# Patient Record
Sex: MALE | Race: BLACK OR AFRICAN AMERICAN | NOT HISPANIC OR LATINO | Employment: OTHER | URBAN - METROPOLITAN AREA
[De-identification: names, ages, dates, MRNs, and addresses within clinical notes are randomized per-mention and may not be internally consistent; named-entity substitution may affect disease eponyms.]

---

## 2018-04-30 ENCOUNTER — OFFICE VISIT (OUTPATIENT)
Dept: PODIATRY | Facility: CLINIC | Age: 43
End: 2018-04-30
Payer: COMMERCIAL

## 2018-04-30 VITALS
BODY MASS INDEX: 25.9 KG/M2 | HEIGHT: 71 IN | WEIGHT: 185 LBS | SYSTOLIC BLOOD PRESSURE: 145 MMHG | DIASTOLIC BLOOD PRESSURE: 80 MMHG | HEART RATE: 79 BPM | TEMPERATURE: 98 F

## 2018-04-30 DIAGNOSIS — M79.2 NEURITIS: ICD-10-CM

## 2018-04-30 DIAGNOSIS — M76.61 ACHILLES TENDINITIS OF RIGHT LOWER EXTREMITY: ICD-10-CM

## 2018-04-30 DIAGNOSIS — M19.071 OSTEOARTHRITIS OF RIGHT FOOT, UNSPECIFIED OSTEOARTHRITIS TYPE: Primary | ICD-10-CM

## 2018-04-30 DIAGNOSIS — G57.51 TARSAL TUNNEL SYNDROME OF RIGHT SIDE: ICD-10-CM

## 2018-04-30 PROCEDURE — 99999 PR PBB SHADOW E&M-EST. PATIENT-LVL III: CPT | Mod: PBBFAC,,, | Performed by: PODIATRIST

## 2018-04-30 PROCEDURE — 99213 OFFICE O/P EST LOW 20 MIN: CPT | Mod: S$PBB,,, | Performed by: PODIATRIST

## 2018-04-30 PROCEDURE — 99213 OFFICE O/P EST LOW 20 MIN: CPT | Mod: PBBFAC | Performed by: PODIATRIST

## 2018-04-30 RX ORDER — LIDOCAINE 40 MG/G
CREAM TOPICAL
COMMUNITY
End: 2018-08-29

## 2018-04-30 RX ORDER — OXYCODONE AND ACETAMINOPHEN 10; 325 MG/1; MG/1
1 TABLET ORAL
Qty: 180 TABLET | Refills: 0 | Status: SHIPPED | OUTPATIENT
Start: 2018-04-30 | End: 2018-05-30 | Stop reason: SDUPTHER

## 2018-04-30 RX ORDER — PREGABALIN 75 MG/1
75 CAPSULE ORAL 2 TIMES DAILY
COMMUNITY
End: 2018-07-02 | Stop reason: SDUPTHER

## 2018-04-30 RX ORDER — PREGABALIN 100 MG/1
100 CAPSULE ORAL 2 TIMES DAILY
Qty: 60 CAPSULE | Refills: 5 | Status: SHIPPED | OUTPATIENT
Start: 2018-04-30 | End: 2018-05-30

## 2018-05-05 PROBLEM — M79.2 NEURITIS: Status: ACTIVE | Noted: 2018-05-05

## 2018-05-05 PROBLEM — G57.51 TARSAL TUNNEL SYNDROME OF RIGHT SIDE: Status: ACTIVE | Noted: 2018-05-05

## 2018-05-05 PROBLEM — M19.071 OSTEOARTHRITIS OF RIGHT FOOT: Status: ACTIVE | Noted: 2018-05-05

## 2018-05-05 NOTE — PROGRESS NOTES
Subjective:       Patient ID: Sg Sesay is a 42 y.o. male.    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  Patient states [facility] has not started physical therapy at this point.  Review of Systems   Musculoskeletal: Positive for gait problem and joint swelling.   All other systems reviewed and are negative.      Objective:      Physical Exam   Cardiovascular:   Pulses:       Dorsalis pedis pulses are 2+ on the right side, and 2+ on the left side.        Posterior tibial pulses are 2+ on the right side, and 2+ on the left side.   Musculoskeletal:        Right foot: There is decreased range of motion and deformity.   Feet:   Right Foot:   Protective Sensation: 4 sites tested. 4 sites sensed.   Left Foot:   Protective Sensation: 4 sites tested. 4 sites sensed.     on evaluation all the patient's incisions are healed patient has less inflammation than previously noted there are no skin breaks no active signs of infection however patient does have a limited range of motion there still some degree of pain primarily in the medial portion of the right foot however overall the area has improved some nerve related discomfort is appreciated.  Previously noted Achilles tendinitis appears resolved at this time.  Assessment:       1. Osteoarthritis of right foot, unspecified osteoarthritis type    2. Achilles tendinitis of right lower extremity    3. Tarsal tunnel syndrome of right side    4. 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  Patient states he is seeing signs of improvement although he still having considerable discomfort he states overall it does feel better he still has some degree of nerve related pain this is in the tarsal tunnel area of the heels doing better previously noted Achilles tendinitis is better.  Patient does require continued pain medication he is currently taking Lyrica 100 mg twice a day this is working well for the patient to reduce his nerve related discomfort.  I had previously ordered physical therapy for the patient however [facility] in the past month has not approved the patient's physical therapy visits and has not been able to start these delaying the patient's recovery.  Plan follow-up will be 1 month patient is now beyond his global period following the surgical procedure and is requiring additional care and treatment beyond surgical care covered under global period.

## 2018-05-30 ENCOUNTER — OFFICE VISIT (OUTPATIENT)
Dept: PODIATRY | Facility: CLINIC | Age: 43
End: 2018-05-30
Payer: COMMERCIAL

## 2018-05-30 VITALS
DIASTOLIC BLOOD PRESSURE: 73 MMHG | BODY MASS INDEX: 24.38 KG/M2 | HEIGHT: 72 IN | HEART RATE: 72 BPM | WEIGHT: 180 LBS | SYSTOLIC BLOOD PRESSURE: 117 MMHG | TEMPERATURE: 97 F

## 2018-05-30 DIAGNOSIS — G57.51 TARSAL TUNNEL SYNDROME OF RIGHT SIDE: Primary | ICD-10-CM

## 2018-05-30 DIAGNOSIS — M79.2 NEURITIS: ICD-10-CM

## 2018-05-30 DIAGNOSIS — M19.071 PRIMARY OSTEOARTHRITIS OF RIGHT FOOT: ICD-10-CM

## 2018-05-30 PROCEDURE — 99999 PR PBB SHADOW E&M-EST. PATIENT-LVL III: CPT | Mod: PBBFAC,,, | Performed by: PODIATRIST

## 2018-05-30 PROCEDURE — 99213 OFFICE O/P EST LOW 20 MIN: CPT | Mod: PBBFAC | Performed by: PODIATRIST

## 2018-05-30 PROCEDURE — 99213 OFFICE O/P EST LOW 20 MIN: CPT | Mod: S$PBB,,, | Performed by: PODIATRIST

## 2018-05-30 RX ORDER — OXYCODONE AND ACETAMINOPHEN 10; 325 MG/1; MG/1
1 TABLET ORAL
Qty: 180 TABLET | Refills: 0 | Status: SHIPPED | OUTPATIENT
Start: 2018-05-30 | End: 2018-05-30

## 2018-05-30 RX ORDER — OXYCODONE AND ACETAMINOPHEN 10; 325 MG/1; MG/1
1 TABLET ORAL 4 TIMES DAILY
Qty: 120 TABLET | Refills: 0 | Status: SHIPPED | OUTPATIENT
Start: 2018-05-30 | End: 2018-06-27 | Stop reason: SDUPTHER

## 2018-06-02 NOTE — PROGRESS NOTES
Subjective:       Patient ID: Sg Sesay is a 42 y.o. male.    Chief Complaint: Follow-up    HPI patient presents for follow-up subtalar joint fusion that needed to be redone due to another surgeon performing the procedure and leading to a nonunion.  Patient states he still waiting on the [facility] to [facility] has not started physical therapy at this point.  Review of Systems   Musculoskeletal: Positive for gait problem and joint swelling.   All other systems reviewed and are negative.      Objective:      Physical Exam   Cardiovascular:   Pulses:       Dorsalis pedis pulses are 2+ on the right side, and 2+ on the left side.        Posterior tibial pulses are 2+ on the right side, and 2+ on the left side.   Musculoskeletal:        Right foot: There is decreased range of motion and deformity.   Feet:   Right Foot:   Protective Sensation: 4 sites tested. 4 sites sensed.   Left Foot:   Protective Sensation: 4 sites tested. 4 sites sensed.     on evaluation all the patient's incisions are healed patient has less inflammation than previously noted there are no skin breaks no active signs of infection however patient does have a limited range of motion there still some degree of pain primarily in the medial portion of the right foot however overall the area has improved some nerve related discomfort is appreciated.  Previously noted Achilles tendinitis appears resolved at this time.  Assessment:       1. Tarsal tunnel syndrome of right side    2. Neuritis    3. Primary osteoarthritis of right foot        Plan:       Patient presents for follow-up.The patient presents status post fusion subtalar joint of previous nonunion right tarsal tunnel release with release of Phan nerve entrapment and scar revision right.patient states that he is doing very well he's not really having any pain in the subtalar joint or the ankle joint.  Patient states he is seeing signs of improvement although he still having  considerable discomfort he states overall it does feel better he still has some degree of nerve related pain this is in the tarsal tunnel area of the heels doing better previously noted Achilles tendinitis is better.  Patient does require continued pain medication he is currently taking Lyrica 100 mg twice a day this is working well for the patient to reduce his nerve related discomfort. Plan follow-up will be 1 month patient is now beyond his global period following the surgical procedure and is requiring additional care and treatment beyond surgical care covered under global period.  Patient states that the [facility] refused the physical therapy were that I provided and are requiring him to drive to [employer] to go to physical therapy once a week.  Patient states he is not releasing the benefit of physical therapy they are doing some massage and some stretching they are not doing any of the cold laser dry needling or iontophoresis that I had recommended.  Patient indicated that he is very frustrated that he is not able to get the physical therapy he needs.  Patient is having some discomfort overlying the styloid process right possibly related to the patient's compensation I did apply some lateral posting to the right arch supports and orthotic to see how this offload pressure from the area I have used adhesive felt as a temporary posting to ensure that the patient is tolerating this well I can add permanent posting as necessary patient noted relief almost immediately.  Plan follow-up 1 month.

## 2018-06-27 ENCOUNTER — OFFICE VISIT (OUTPATIENT)
Dept: PODIATRY | Facility: CLINIC | Age: 43
End: 2018-06-27
Payer: COMMERCIAL

## 2018-06-27 ENCOUNTER — HOSPITAL ENCOUNTER (OUTPATIENT)
Dept: RADIOLOGY | Facility: HOSPITAL | Age: 43
Discharge: HOME | End: 2018-06-27
Attending: PODIATRIST
Payer: MEDICARE

## 2018-06-27 VITALS
HEIGHT: 72 IN | HEART RATE: 67 BPM | WEIGHT: 185 LBS | SYSTOLIC BLOOD PRESSURE: 116 MMHG | TEMPERATURE: 98 F | DIASTOLIC BLOOD PRESSURE: 66 MMHG | BODY MASS INDEX: 25.06 KG/M2

## 2018-06-27 DIAGNOSIS — M79.671 CHRONIC PAIN IN RIGHT FOOT: Primary | ICD-10-CM

## 2018-06-27 DIAGNOSIS — G89.29 CHRONIC PAIN IN RIGHT FOOT: Primary | ICD-10-CM

## 2018-06-27 DIAGNOSIS — M79.671 CHRONIC PAIN IN RIGHT FOOT: ICD-10-CM

## 2018-06-27 DIAGNOSIS — M19.071 PRIMARY OSTEOARTHRITIS OF RIGHT FOOT: ICD-10-CM

## 2018-06-27 DIAGNOSIS — G89.29 CHRONIC PAIN IN RIGHT FOOT: ICD-10-CM

## 2018-06-27 PROCEDURE — 99999 PR PBB SHADOW E&M-EST. PATIENT-LVL III: CPT | Mod: 25,PBBFAC,, | Performed by: PODIATRIST

## 2018-06-27 PROCEDURE — 73630 X-RAY EXAM OF FOOT: CPT | Mod: 26,RT,, | Performed by: RADIOLOGY

## 2018-06-27 PROCEDURE — 99213 OFFICE O/P EST LOW 20 MIN: CPT | Mod: 25,PBBFAC | Performed by: PODIATRIST

## 2018-06-27 PROCEDURE — 73630 X-RAY EXAM OF FOOT: CPT | Mod: TC,FY,RT

## 2018-06-27 PROCEDURE — 99213 OFFICE O/P EST LOW 20 MIN: CPT | Mod: S$PBB,,, | Performed by: PODIATRIST

## 2018-06-27 RX ORDER — OXYCODONE AND ACETAMINOPHEN 10; 325 MG/1; MG/1
1 TABLET ORAL 4 TIMES DAILY
Qty: 120 TABLET | Refills: 0 | Status: SHIPPED | OUTPATIENT
Start: 2018-06-27 | End: 2018-07-27

## 2018-07-02 ENCOUNTER — TELEPHONE (OUTPATIENT)
Dept: PODIATRY | Facility: CLINIC | Age: 43
End: 2018-07-02

## 2018-07-02 RX ORDER — PREGABALIN 75 MG/1
75 CAPSULE ORAL 2 TIMES DAILY
Qty: 60 CAPSULE | Refills: 3 | Status: SHIPPED | OUTPATIENT
Start: 2018-07-02 | End: 2018-08-01 | Stop reason: SDUPTHER

## 2018-07-06 ENCOUNTER — TELEPHONE (OUTPATIENT)
Dept: PODIATRY | Facility: CLINIC | Age: 43
End: 2018-07-06

## 2018-07-06 NOTE — TELEPHONE ENCOUNTER
----- Message from RT Ann sent at 7/6/2018  2:11 PM CDT -----  Contact: pt    pt , requesting an appt to be worked in today for arch support adjustments, thanks.

## 2018-07-07 NOTE — PROGRESS NOTES
Subjective:       Patient ID: Sg Sesay is a 42 y.o. male.    Chief Complaint: Foot Problem and Follow-up    HPI patient presents for follow-up subtalar joint fusion that needed to be redone due to another surgeon performing the procedure and leading to a nonunion.  Patient states he still waiting on the [facility] to [facility] has not started physical therapy at this point.  Review of Systems   Musculoskeletal: Positive for gait problem and joint swelling.   All other systems reviewed and are negative.      Objective:      Physical Exam   Cardiovascular:   Pulses:       Dorsalis pedis pulses are 2+ on the right side, and 2+ on the left side.        Posterior tibial pulses are 2+ on the right side, and 2+ on the left side.   Musculoskeletal:        Right foot: There is decreased range of motion and deformity.   Feet:   Right Foot:   Protective Sensation: 4 sites tested. 4 sites sensed.   Left Foot:   Protective Sensation: 4 sites tested. 4 sites sensed.     on evaluation all the patient's incisions are healed patient has less inflammation than previously noted there are no skin breaks no active signs of infection however patient does have a limited range of motion there still some degree of pain primarily in the medial portion of the right foot however overall the area has improved some nerve related discomfort is appreciated.  Previously noted Achilles tendinitis appears resolved at this time.  Assessment:       1. Chronic pain in right foot    2. Primary osteoarthritis of right foot        Plan:       Patient presents for follow-up.The patient presents status post fusion subtalar joint of previous nonunion right tarsal tunnel release with release of Phan nerve entrapment and scar revision right.patient states that he is doing very well he's not really having any pain in the subtalar joint or the ankle joint.  Patient states he is seeing signs of improvement although he still having considerable  discomfort he states overall it does feel better he still has some degree of nerve related pain this is in the tarsal tunnel area of the heels doing better previously noted Achilles tendinitis is better.  Patient does require continued pain medication he is currently taking Lyrica 100 mg twice a day this is working well for the patient to reduce his nerve related discomfort. Plan follow-up will be 1 month patient is now beyond his global period following the surgical procedure and is requiring additional care and treatment beyond surgical care covered under global period.  Patient states that the [facility] refused the physical therapy were that I provided and are requiring him to drive to [employer] [facility] to go to physical therapy once a week.  Patient states he is not releasing the benefit of physical therapy they are doing some massage and some stretching they are not doing any of the cold laser dry needling or iontophoresis that I had recommended.  Patient indicated that he is very frustrated that he is not able to get the physical therapy he needs.  Patient is having some discomfort overlying the styloid process right possibly related to the patient's compensation I did apply some lateral posting to the right arch supports and orthotic to see how this offload pressure from the area.   Patient responded well to the temporary lateral posting right foot therefore I have added a permanent cortex posting which should continue to help the patient to relieve the pain he is having overlying the styloid process right.    X-rays were taken of the area today to ensure no significant abnormality noted there is small degenerative spurring noted at the insertion of the peroneus brevis right. Plan follow-up 1 month.

## 2018-07-11 ENCOUNTER — OFFICE VISIT (OUTPATIENT)
Dept: PODIATRY | Facility: CLINIC | Age: 43
End: 2018-07-11
Payer: OTHER GOVERNMENT

## 2018-07-11 VITALS
BODY MASS INDEX: 26.32 KG/M2 | HEART RATE: 75 BPM | TEMPERATURE: 97 F | DIASTOLIC BLOOD PRESSURE: 70 MMHG | HEIGHT: 71 IN | WEIGHT: 188 LBS | SYSTOLIC BLOOD PRESSURE: 125 MMHG

## 2018-07-11 DIAGNOSIS — M79.671 CHRONIC PAIN IN RIGHT FOOT: ICD-10-CM

## 2018-07-11 DIAGNOSIS — G89.29 CHRONIC PAIN IN RIGHT FOOT: ICD-10-CM

## 2018-07-11 DIAGNOSIS — M76.71 PERONEAL TENDONITIS, RIGHT: Primary | ICD-10-CM

## 2018-07-11 DIAGNOSIS — M79.2 NEURITIS: ICD-10-CM

## 2018-07-11 PROCEDURE — 99213 OFFICE O/P EST LOW 20 MIN: CPT | Mod: PBBFAC | Performed by: PODIATRIST

## 2018-07-11 PROCEDURE — 99999 PR PBB SHADOW E&M-EST. PATIENT-LVL III: CPT | Mod: PBBFAC,,, | Performed by: PODIATRIST

## 2018-07-11 PROCEDURE — 99213 OFFICE O/P EST LOW 20 MIN: CPT | Mod: S$PBB,,, | Performed by: PODIATRIST

## 2018-07-15 NOTE — PROGRESS NOTES
Subjective:       Patient ID: Sg Sesay is a 42 y.o. male.    Chief Complaint: Follow-up; Foot Pain; and Foot Problem    Foot Pain   Associated symptoms include joint swelling.    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  Patient states he still waiting on the [facility] to [facility] therapy has not started physical therapy at this point.  Review of Systems   Musculoskeletal: Positive for gait problem and joint swelling.   All other systems reviewed and are negative.      Objective:      Physical Exam   Cardiovascular:   Pulses:       Dorsalis pedis pulses are 2+ on the right side, and 2+ on the left side.        Posterior tibial pulses are 2+ on the right side, and 2+ on the left side.   Musculoskeletal:        Right foot: There is decreased range of motion and deformity.   Feet:   Right Foot:   Protective Sensation: 4 sites tested. 4 sites sensed.   Left Foot:   Protective Sensation: 4 sites tested. 4 sites sensed.     on evaluation all the patient's incisions are healed patient has less inflammation than previously noted there are no skin breaks no active signs of infection however patient does have a limited range of motion there still some degree of pain primarily in the medial portion of the right foot however overall the area has improved some nerve related discomfort is appreciated.  Previously noted Achilles tendinitis appears resolved at this time.  Assessment:       1. Chronic pain in right foot    2. Neuritis        Plan:       Patient presents for follow-up.The patient presents status post fusion subtalar joint of previous nonunion right tarsal tunnel release with release of Phna nerve entrapment and scar revision right.patient states that he is doing very well he's not really having any pain in the subtalar joint or the ankle joint.   Patient is presenting today for an unscheduled  visit he needs adjustments to his arch supports I had placed some lateral posting on the patient's arch supports because he was having pain overlying the styloid process on the right foot and associated peroneal tendinitis.  Patient states he thought that the additional posting was going to be good however it has caused more pressure and pain over the area previous x-rays did display degenerative spurring noted at the insertion of the peroneus brevis.  The lateral posting was removed from the right arch support and I placed a soft PPT cut out pa the base of the 5th metatarsal patient stated that this felt better immediately.  Plan follow-up will be 2 weeks to see how the patient is responding to orthotic adjustment.

## 2018-08-01 ENCOUNTER — OFFICE VISIT (OUTPATIENT)
Dept: PODIATRY | Facility: CLINIC | Age: 43
End: 2018-08-01
Payer: OTHER GOVERNMENT

## 2018-08-01 VITALS
TEMPERATURE: 97 F | WEIGHT: 190 LBS | SYSTOLIC BLOOD PRESSURE: 132 MMHG | HEART RATE: 66 BPM | HEIGHT: 72 IN | DIASTOLIC BLOOD PRESSURE: 73 MMHG | BODY MASS INDEX: 25.73 KG/M2

## 2018-08-01 DIAGNOSIS — G89.29 CHRONIC PAIN OF RIGHT ANKLE: Primary | ICD-10-CM

## 2018-08-01 DIAGNOSIS — G89.29 CHRONIC PAIN IN RIGHT FOOT: ICD-10-CM

## 2018-08-01 DIAGNOSIS — M79.671 CHRONIC PAIN IN RIGHT FOOT: ICD-10-CM

## 2018-08-01 DIAGNOSIS — M79.2 NEURITIS: ICD-10-CM

## 2018-08-01 DIAGNOSIS — M25.571 CHRONIC PAIN OF RIGHT ANKLE: Primary | ICD-10-CM

## 2018-08-01 DIAGNOSIS — M76.61 ACHILLES TENDINITIS OF RIGHT LOWER EXTREMITY: ICD-10-CM

## 2018-08-01 DIAGNOSIS — M19.071 PRIMARY OSTEOARTHRITIS OF RIGHT FOOT: ICD-10-CM

## 2018-08-01 PROCEDURE — 99213 OFFICE O/P EST LOW 20 MIN: CPT | Mod: S$PBB,,, | Performed by: PODIATRIST

## 2018-08-01 PROCEDURE — 99213 OFFICE O/P EST LOW 20 MIN: CPT | Mod: PBBFAC | Performed by: PODIATRIST

## 2018-08-01 PROCEDURE — 99999 PR PBB SHADOW E&M-EST. PATIENT-LVL III: CPT | Mod: PBBFAC,,, | Performed by: PODIATRIST

## 2018-08-01 RX ORDER — OXYCODONE AND ACETAMINOPHEN 10; 325 MG/1; MG/1
1 TABLET ORAL 4 TIMES DAILY
Qty: 120 TABLET | Refills: 0 | Status: SHIPPED | OUTPATIENT
Start: 2018-08-01 | End: 2018-08-29 | Stop reason: SDUPTHER

## 2018-08-01 RX ORDER — PREGABALIN 75 MG/1
75 CAPSULE ORAL 2 TIMES DAILY
Qty: 60 CAPSULE | Refills: 3 | Status: SHIPPED | OUTPATIENT
Start: 2018-08-01 | End: 2018-08-31

## 2018-08-05 NOTE — PROGRESS NOTES
Subjective:       Patient ID: Sg Sesay is a 42 y.o. male.    Chief Complaint: Foot Pain and Foot Problem    Foot Pain   Associated symptoms include joint swelling.    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  Patient states he still waiting on the [facility] to approve physical therapy has not started physical therapy at this point.  Review of Systems   Musculoskeletal: Positive for gait problem and joint swelling.   All other systems reviewed and are negative.      Objective:      Physical Exam   Cardiovascular:   Pulses:       Dorsalis pedis pulses are 2+ on the right side, and 2+ on the left side.        Posterior tibial pulses are 2+ on the right side, and 2+ on the left side.   Musculoskeletal:        Right foot: There is decreased range of motion and deformity.   Feet:   Right Foot:   Protective Sensation: 4 sites tested. 4 sites sensed.   Left Foot:   Protective Sensation: 4 sites tested. 4 sites sensed.     On evaluation all the patient's incisions are healed patient has less inflammation than previously noted there are no skin breaks no active signs of infection however patient does have a limited range of motion there still some degree of pain primarily in the medial portion of the right foot however overall the area has improved some nerve related discomfort is appreciated.    Patient is complaining about Achilles tendon pain and tightness at the back of his right heel there is limited dorsiflexion noted due to excessive tightness in this area.  Assessment:       1. Chronic pain of right ankle    2. Chronic pain in right foot    3. Primary osteoarthritis of right foot    4. Neuritis    5. Achilles tendinitis of right lower extremity        Plan:       Patient presents for follow-up.The patient presents status post fusion subtalar joint of previous nonunion right tarsal tunnel release with  release of Phan nerve entrapment and scar revision right.patient states that he is doing very well he's not really having any pain in the subtalar joint or the ankle joint.   Patient states that padding that I placed on his insole has definitely relieved and completely resolved the discomfort he was having overlying the styloid process base of the 5th metatarsal right he is having some pain in the Achilles area that he states has become excessively tight he feels pulling in this area patient indicated today that the physical therapy has been doing has not really given him much relief the [facility] has refused to authorize the physical therapy that I have ordered which would include cold laser and dry needling instead they have got him doing water therapy that he states has not been very helpful.  Patient indicates the Lyrica I previously give him does help quite a bit he has a lot less heel pain tarsal tunnel pain and subtalar joint pain mainly today he is complaining about the Achilles tendon region.  Patient advised he is going to need to contact the [facility] in further discuss his physical therapy ultimately the patient needs the appropriate physical therapy in order to proceed move forward and decreases discomfort.

## 2018-08-10 ENCOUNTER — TELEPHONE (OUTPATIENT)
Dept: PODIATRY | Facility: CLINIC | Age: 43
End: 2018-08-10

## 2018-08-10 NOTE — TELEPHONE ENCOUNTER
----- Message from Shakira An sent at 8/10/2018 11:22 AM CDT -----  Contact: self  Patient need to speak with nurse regarding a medical slip for jury duty     Patient will like to  slip today 8/10      Please call to advice [phone] (home)

## 2018-08-29 ENCOUNTER — OFFICE VISIT (OUTPATIENT)
Dept: PODIATRY | Facility: CLINIC | Age: 43
End: 2018-08-29
Payer: OTHER GOVERNMENT

## 2018-08-29 VITALS
TEMPERATURE: 98 F | OXYGEN SATURATION: 97 % | BODY MASS INDEX: 25.06 KG/M2 | HEART RATE: 71 BPM | DIASTOLIC BLOOD PRESSURE: 69 MMHG | SYSTOLIC BLOOD PRESSURE: 123 MMHG | RESPIRATION RATE: 18 BRPM | HEIGHT: 72 IN | WEIGHT: 185 LBS

## 2018-08-29 DIAGNOSIS — M25.571 CHRONIC PAIN OF RIGHT ANKLE: ICD-10-CM

## 2018-08-29 DIAGNOSIS — M79.2 NEURITIS: ICD-10-CM

## 2018-08-29 DIAGNOSIS — G89.29 CHRONIC PAIN OF RIGHT ANKLE: ICD-10-CM

## 2018-08-29 DIAGNOSIS — M19.071 PRIMARY OSTEOARTHRITIS OF RIGHT FOOT: ICD-10-CM

## 2018-08-29 DIAGNOSIS — G89.29 CHRONIC PAIN IN RIGHT FOOT: Primary | ICD-10-CM

## 2018-08-29 DIAGNOSIS — M79.671 CHRONIC PAIN IN RIGHT FOOT: Primary | ICD-10-CM

## 2018-08-29 PROCEDURE — 99213 OFFICE O/P EST LOW 20 MIN: CPT | Mod: S$PBB,,, | Performed by: PODIATRIST

## 2018-08-29 PROCEDURE — 99213 OFFICE O/P EST LOW 20 MIN: CPT | Mod: PBBFAC | Performed by: PODIATRIST

## 2018-08-29 PROCEDURE — 99999 PR PBB SHADOW E&M-EST. PATIENT-LVL III: CPT | Mod: PBBFAC,,, | Performed by: PODIATRIST

## 2018-08-29 RX ORDER — OXYCODONE AND ACETAMINOPHEN 10; 325 MG/1; MG/1
1 TABLET ORAL 4 TIMES DAILY
Qty: 120 TABLET | Refills: 0 | Status: SHIPPED | OUTPATIENT
Start: 2018-08-29 | End: 2018-09-26 | Stop reason: SDUPTHER

## 2018-08-29 RX ORDER — PREGABALIN 150 MG/1
150 CAPSULE ORAL 3 TIMES DAILY
Qty: 90 CAPSULE | Refills: 6 | Status: SHIPPED | OUTPATIENT
Start: 2018-08-29 | End: 2018-09-28

## 2018-09-03 NOTE — PROGRESS NOTES
Subjective:       Patient ID: Sg Sesay is a 42 y.o. male.    Chief Complaint: Follow-up    Foot Pain   Associated symptoms include joint swelling.    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  Patient states he still waiting on the [facility] to approve physical therapy has not started physical therapy at this point.  Review of Systems   Musculoskeletal: Positive for gait problem and joint swelling.   All other systems reviewed and are negative.      Objective:      Physical Exam   Cardiovascular:   Pulses:       Dorsalis pedis pulses are 2+ on the right side, and 2+ on the left side.        Posterior tibial pulses are 2+ on the right side, and 2+ on the left side.   Musculoskeletal:        Right foot: There is decreased range of motion and deformity.   Feet:   Right Foot:   Protective Sensation: 4 sites tested. 4 sites sensed.   Left Foot:   Protective Sensation: 4 sites tested. 4 sites sensed.     On evaluation all the patient's incisions are healed patient has less inflammation than previously noted there are no skin breaks no active signs of infection however patient does have a limited range of motion there still some degree of pain primarily in the medial portion of the right foot however overall the area has improved some nerve related discomfort is appreciated.    Patient is complaining about Achilles tendon pain and tightness at the back of his right heel there is limited dorsiflexion noted due to excessive tightness in this area.  Assessment:       1. Chronic pain in right foot    2. Chronic pain of right ankle    3. Primary osteoarthritis of right foot    4. Neuritis        Plan:       Patient presents for follow-up.The patient presents status post fusion subtalar joint of previous nonunion right tarsal tunnel release with release of Phan nerve entrapment and scar revision right.patient states  that he is doing very well he's not really having any pain in the subtalar joint or the ankle joint.   Patient states that padding that I placed on his insole has definitely relieved and completely resolved the discomfort he was having overlying the styloid process base of the 5th metatarsal right he is having some pain in the Achilles area that he states has become excessively tight he feels pulling in this area patient indicated today that the physical therapy has been doing has not really given him much relief the [facility] has refused to authorize the physical therapy that I have ordered which would include cold laser and dry needling instead they have got him doing water therapy that he states has not been very helpful. Patient indicates that he has completed his physical therapy in the pool and states this did not help him at all did not help to diminished his discomfort at all and has not restored any range of motion. Patient indicates the Lyrica I previously give him does help quite a bit he has a lot less heel pain tarsal tunnel pain and subtalar joint pain mainly today he is complaining about the Achilles tendon region.    Patient was advised that the [facility] as inability to provide him appropriate physical therapy has been detrimental to his recovery it is definitely halted his recovery completely I had requested physical therapy on multiple occasions I have denied it and have instead only provided their own very limited therapy which has not benefited the patient at all.  Patient again advised he needs appropriate physical therapy as previously ordered he states he is going to contact the [facility] to try to get them to override and approved the physical therapy that I have ordered.  Patient's current Lyrica is being increased to 150 mg 3 times a day.

## 2018-09-10 ENCOUNTER — TELEPHONE (OUTPATIENT)
Dept: PODIATRY | Facility: CLINIC | Age: 43
End: 2018-09-10

## 2018-09-10 NOTE — TELEPHONE ENCOUNTER
----- Message from Cony De Anda sent at 9/10/2018  1:09 PM CDT -----  Contact: self  Patient needs a secondary authorization for the [facility] regarding physical therapy. Please fax to [phone] attn Verónica. Patient states Verónica is there to day if it can be sent today. Please call patient at [phone]. Thanks!

## 2018-09-26 ENCOUNTER — HOSPITAL ENCOUNTER (OUTPATIENT)
Dept: RADIOLOGY | Facility: HOSPITAL | Age: 43
Discharge: HOME | End: 2018-09-26
Attending: PODIATRIST
Payer: MEDICARE

## 2018-09-26 ENCOUNTER — OFFICE VISIT (OUTPATIENT)
Dept: PODIATRY | Facility: CLINIC | Age: 43
End: 2018-09-26
Payer: MEDICARE

## 2018-09-26 VITALS
HEART RATE: 66 BPM | SYSTOLIC BLOOD PRESSURE: 117 MMHG | DIASTOLIC BLOOD PRESSURE: 72 MMHG | WEIGHT: 195 LBS | HEIGHT: 72 IN | BODY MASS INDEX: 26.41 KG/M2 | TEMPERATURE: 98 F

## 2018-09-26 DIAGNOSIS — M79.671 CHRONIC PAIN IN RIGHT FOOT: Primary | ICD-10-CM

## 2018-09-26 DIAGNOSIS — M19.071 PRIMARY OSTEOARTHRITIS OF RIGHT FOOT: ICD-10-CM

## 2018-09-26 DIAGNOSIS — G89.29 CHRONIC PAIN IN RIGHT FOOT: Primary | ICD-10-CM

## 2018-09-26 DIAGNOSIS — M79.671 CHRONIC PAIN IN RIGHT FOOT: ICD-10-CM

## 2018-09-26 DIAGNOSIS — M25.571 CHRONIC PAIN OF RIGHT ANKLE: ICD-10-CM

## 2018-09-26 DIAGNOSIS — G89.29 CHRONIC PAIN IN RIGHT FOOT: ICD-10-CM

## 2018-09-26 DIAGNOSIS — G89.29 CHRONIC PAIN OF RIGHT ANKLE: ICD-10-CM

## 2018-09-26 PROCEDURE — 99213 OFFICE O/P EST LOW 20 MIN: CPT | Mod: S$PBB,,, | Performed by: PODIATRIST

## 2018-09-26 PROCEDURE — 73630 X-RAY EXAM OF FOOT: CPT | Mod: TC,FY,RT

## 2018-09-26 PROCEDURE — 99999 PR PBB SHADOW E&M-EST. PATIENT-LVL III: CPT | Mod: PBBFAC,,, | Performed by: PODIATRIST

## 2018-09-26 PROCEDURE — 99213 OFFICE O/P EST LOW 20 MIN: CPT | Mod: PBBFAC,25 | Performed by: PODIATRIST

## 2018-09-26 PROCEDURE — 73630 X-RAY EXAM OF FOOT: CPT | Mod: 26,RT,, | Performed by: RADIOLOGY

## 2018-09-26 RX ORDER — OXYCODONE AND ACETAMINOPHEN 10; 325 MG/1; MG/1
1 TABLET ORAL 4 TIMES DAILY
Qty: 120 TABLET | Refills: 0 | Status: SHIPPED | OUTPATIENT
Start: 2018-09-26 | End: 2018-10-24 | Stop reason: SDUPTHER

## 2018-09-30 NOTE — PROGRESS NOTES
Subjective:       Patient ID: Sg Sesay is a 42 y.o. male.    Chief Complaint: Follow-up; Foot Pain; and Foot Problem    Foot Pain   Associated symptoms include joint swelling.    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  Patient states he still [facility] to [facility] has not started physical therapy at this point.  Review of Systems   Musculoskeletal: Positive for gait problem and joint swelling.   All other systems reviewed and are negative.      Objective:      Physical Exam   Cardiovascular:   Pulses:       Dorsalis pedis pulses are 2+ on the right side, and 2+ on the left side.        Posterior tibial pulses are 2+ on the right side, and 2+ on the left side.   Musculoskeletal:        Right foot: There is decreased range of motion and deformity.   Feet:   Right Foot:   Protective Sensation: 4 sites tested. 4 sites sensed.   Left Foot:   Protective Sensation: 4 sites tested. 4 sites sensed.     On evaluation all the patient's incisions are healed patient has less inflammation than previously noted there are no skin breaks no active signs of infection however patient does have a limited range of motion there still some degree of pain primarily in the medial portion of the right foot however overall the area has improved some nerve related discomfort is appreciated.    Patient is complaining about Achilles tendon pain and tightness at the back of his right heel there is limited dorsiflexion noted due to excessive tightness in this area.  Assessment:       1. Chronic pain in right foot    2. Chronic pain of right ankle    3. 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   Patient states that padding that I placed on his insole has definitely relieved and completely resolved the discomfort he was having overlying the styloid process base of the 5th metatarsal right he is having some pain in the Achilles area that he states has become excessively tight he feels pulling in this area patient indicated today that the physical therapy has been doing has not really given him much relief the [facility] has refused to authorize the physical therapy that I have ordered which would include cold laser and dry needling instead they have got him doing water therapy that he states has not been very helpful. Patient indicates that he has completed his physical therapy in the pool and states this did not help him at all did not help to diminished his discomfort at all and has not restored any range of motion. Patient indicates the Lyrica I previously give him does help quite a bit he has a lot less heel pain tarsal tunnel pain and subtalar joint pain mainly today he is complaining about the Achilles tendon region.    Patient was advised that the [facility] as inability to provide him appropriate physical therapy has been detrimental to his recovery it is definitely halted his recovery completely I had requested physical therapy on multiple occasions I have denied it and have instead only provided their own very limited therapy which has not benefited the patient at all.  Patient again advised he needs appropriate physical therapy as previously ordered he states he is going to contact the [facility] to try to get them to override and approved the physical therapy that I have ordered.    I did add additional padding to the patient's orthotic to offload pressure overlying the styloid process of the right foot x-rays revealed no signs of fracture dislocation there is good fusion at the subtalar joint  right.  I did advise the patient some of the discomfort he is having today is at the calcaneal cuboid joint of the right foot this is noted both on palpation and range of motion right.

## 2018-10-24 ENCOUNTER — TELEPHONE (OUTPATIENT)
Dept: PODIATRY | Facility: CLINIC | Age: 43
End: 2018-10-24

## 2018-10-24 ENCOUNTER — OFFICE VISIT (OUTPATIENT)
Dept: PODIATRY | Facility: CLINIC | Age: 43
End: 2018-10-24
Payer: OTHER GOVERNMENT

## 2018-10-24 VITALS
BODY MASS INDEX: 25.06 KG/M2 | TEMPERATURE: 98 F | SYSTOLIC BLOOD PRESSURE: 113 MMHG | HEART RATE: 69 BPM | DIASTOLIC BLOOD PRESSURE: 78 MMHG | WEIGHT: 185 LBS | HEIGHT: 72 IN

## 2018-10-24 DIAGNOSIS — M25.571 CHRONIC PAIN OF RIGHT ANKLE: Primary | ICD-10-CM

## 2018-10-24 DIAGNOSIS — M72.2 PLANTAR FASCIITIS: ICD-10-CM

## 2018-10-24 DIAGNOSIS — M79.2 NEURITIS: ICD-10-CM

## 2018-10-24 DIAGNOSIS — M19.071 PRIMARY OSTEOARTHRITIS OF RIGHT FOOT: ICD-10-CM

## 2018-10-24 DIAGNOSIS — M79.671 CHRONIC PAIN IN RIGHT FOOT: ICD-10-CM

## 2018-10-24 DIAGNOSIS — G89.29 CHRONIC PAIN OF RIGHT ANKLE: Primary | ICD-10-CM

## 2018-10-24 DIAGNOSIS — G89.29 CHRONIC PAIN IN RIGHT FOOT: ICD-10-CM

## 2018-10-24 PROCEDURE — 99213 OFFICE O/P EST LOW 20 MIN: CPT | Mod: S$PBB,,, | Performed by: PODIATRIST

## 2018-10-24 PROCEDURE — 99999 PR PBB SHADOW E&M-EST. PATIENT-LVL III: CPT | Mod: PBBFAC,,, | Performed by: PODIATRIST

## 2018-10-24 PROCEDURE — 99213 OFFICE O/P EST LOW 20 MIN: CPT | Mod: PBBFAC | Performed by: PODIATRIST

## 2018-10-24 RX ORDER — PREGABALIN 150 MG/1
CAPSULE ORAL
Refills: 5 | COMMUNITY
Start: 2018-10-01 | End: 2019-03-13 | Stop reason: SDUPTHER

## 2018-10-24 RX ORDER — OXYCODONE AND ACETAMINOPHEN 10; 325 MG/1; MG/1
1 TABLET ORAL 4 TIMES DAILY
Qty: 120 TABLET | Refills: 0 | Status: SHIPPED | OUTPATIENT
Start: 2018-10-24 | End: 2018-11-05 | Stop reason: SDUPTHER

## 2018-10-24 NOTE — TELEPHONE ENCOUNTER
----- Message from Yash Lantigua sent at 10/24/2018  9:36 AM CDT -----  Contact: [facility]  Type: Needs Medical Advice    Who Called:  [facility]  Additional Information: Pharmacy is out of oxyCODONE-acetaminophen (PERCOCET)  mg per tablet medication.     [id number] 234 beds at the [facility], [place] - [address]  [address]  [place] [address]  Phone: [phone] Fax: [phone]

## 2018-10-24 NOTE — TELEPHONE ENCOUNTER
Left message for pt that medication sent to pharmacy would not be available until Friday or Monday the pharmacy had to order call pharmacy if he wants them to when meds are available or call the office if this is not ok

## 2018-10-30 ENCOUNTER — TELEPHONE (OUTPATIENT)
Dept: PODIATRY | Facility: CLINIC | Age: 43
End: 2018-10-30

## 2018-10-30 NOTE — TELEPHONE ENCOUNTER
Left a message for patient that we would have to do a drug screen.  Advised him I would call back tomorrow with more information.

## 2018-10-30 NOTE — TELEPHONE ENCOUNTER
----- Message from Xiomara Oates sent at 10/30/2018  2:49 PM CDT -----  Contact: Patient  Type:  RX Refill Request    Who Called: Patient  Refill or New Rx:  Refill  RX Name and Strength:  oxyCODONE-acetaminophen (PERCOCET)  mg per tablet,   LYRICA 150 mg capsule  How is the patient currently taking it? (ex. 1XDay): Percocet- Take 1 tablet by mouth 4 (four) times daily. - Oral  LYRICA- TK ONE C PO TID  Is this a 30 day or 90 day RX:  Percocet- 120 tablets and LYRICA -90 Tablets  Preferred Pharmacy with phone number: Hard copy for the Percocet and electronic for the LYRICA  Local or Mail Order:  Local  Ordering Provider:  Dr Jah Torres  [facility] Call Back Number:  [phone]  Additional Information:  Calling to request a refill. Please advise.

## 2018-10-31 DIAGNOSIS — Z02.83 ENCOUNTER FOR DRUG SCREENING: Primary | ICD-10-CM

## 2018-10-31 NOTE — PROGRESS NOTES
Subjective:       Patient ID: Sg Sesay is a 42 y.o. male.    Chief Complaint: Follow-up; Foot Pain; and Foot Problem  Patient presents for followed up of continued right foot pain he states he has been actually having more pain in his left foot because of compensating.  Patient relates arch pain left continued pain right.  Foot Pain   Associated symptoms include joint swelling.      Review of Systems   Musculoskeletal: Positive for gait problem and joint swelling.   All other systems reviewed and are negative.      Objective:      Physical Exam   Cardiovascular:   Pulses:       Dorsalis pedis pulses are 2+ on the right side, and 2+ on the left side.        Posterior tibial pulses are 2+ on the right side, and 2+ on the left side.   Musculoskeletal:        Right foot: There is decreased range of motion and deformity.   Feet:   Right Foot:   Protective Sensation: 4 sites tested. 4 sites sensed.   Left Foot:   Protective Sensation: 4 sites tested. 4 sites sensed.     On evaluation all the patient's incisions are healed patient has less inflammation than previously noted there are no skin breaks no active signs of infection however patient does have a limited range of motion there still some degree of pain primarily in the medial portion of the right foot however overall the area has improved some nerve related discomfort is appreciated.    Patient is complaining about Achilles tendon pain and tightness at the back of his right heel there is limited dorsiflexion noted due to excessive tightness in this area.  Patient has findings consistent with plantar fascial pain left.  Assessment:       1. Chronic pain of right ankle    2. Primary osteoarthritis of right foot    3. Chronic pain in right foot    4. Neuritis    5. Plantar fasciitis - Left Foot        Plan:         Following evaluation patient has continued pain of the right foot he again has not been able to be approved for physical therapy which he  desperately needs of the right foot the [facility] has held up his approval patient states he does have an appointment with the [facility] on October 30th he is helping at that time they will approve him for physical therapy in the meantime the patient has been experiencing arch and heel pain in the left foot.  Patient indicated the blue pad that I placed on his right arch support was too much and needed to be removed.  I did add some lateral posting to the patient's right orthotic to help offload pressure overlying the base of the 5th metatarsal right patient is going to try this and see how well he does with this patient is going to continue taking Lyrica as directed I did give the patient pain medication as directed for chronic pain of the right foot and now of the left foot I did evaluate the  prior to prescribing this.  I am hoping the patient will be able to be approved for physical therapy he should have been approved many months ago which would have helped his progress following surgery on the right foot however this has been repeatedly denied by the [facility].  Total face-to-face time including discussion evaluation treatment equaled 20 min.

## 2018-11-01 ENCOUNTER — LAB VISIT (OUTPATIENT)
Dept: LAB | Facility: HOSPITAL | Age: 43
End: 2018-11-01
Attending: PODIATRIST
Payer: MEDICARE

## 2018-11-01 DIAGNOSIS — Z02.83 ENCOUNTER FOR DRUG SCREENING: ICD-10-CM

## 2018-11-01 PROCEDURE — 80307 DRUG TEST PRSMV CHEM ANLYZR: CPT

## 2018-11-05 ENCOUNTER — TELEPHONE (OUTPATIENT)
Dept: PODIATRY | Facility: CLINIC | Age: 43
End: 2018-11-05

## 2018-11-05 RX ORDER — OXYCODONE AND ACETAMINOPHEN 10; 325 MG/1; MG/1
1 TABLET ORAL 4 TIMES DAILY
Qty: 28 TABLET | Refills: 0 | Status: SHIPPED | OUTPATIENT
Start: 2018-11-05 | End: 2018-11-06 | Stop reason: SDUPTHER

## 2018-11-05 NOTE — TELEPHONE ENCOUNTER
Patient is without pain medications d/t new drug screening being a send out.  Can you please write him something to last until we get the results back.

## 2018-11-06 LAB

## 2018-11-06 RX ORDER — OXYCODONE AND ACETAMINOPHEN 10; 325 MG/1; MG/1
1 TABLET ORAL 4 TIMES DAILY
Qty: 84 TABLET | Refills: 0 | Status: SHIPPED | OUTPATIENT
Start: 2018-11-06 | End: 2018-11-26 | Stop reason: SDUPTHER

## 2018-11-16 ENCOUNTER — HOSPITAL ENCOUNTER (EMERGENCY)
Facility: HOSPITAL | Age: 43
Discharge: HOME | End: 2018-11-16
Attending: FAMILY MEDICINE
Payer: MEDICARE

## 2018-11-16 VITALS
HEART RATE: 102 BPM | BODY MASS INDEX: 25.06 KG/M2 | TEMPERATURE: 99 F | RESPIRATION RATE: 20 BRPM | OXYGEN SATURATION: 98 % | SYSTOLIC BLOOD PRESSURE: 146 MMHG | DIASTOLIC BLOOD PRESSURE: 96 MMHG | WEIGHT: 185 LBS | HEIGHT: 72 IN

## 2018-11-16 DIAGNOSIS — S09.93XA FACIAL INJURY, INITIAL ENCOUNTER: Primary | ICD-10-CM

## 2018-11-16 PROCEDURE — 99284 EMERGENCY DEPT VISIT MOD MDM: CPT | Mod: 25

## 2018-11-16 PROCEDURE — 70450 CT HEAD/BRAIN W/O DYE: CPT | Mod: 26,,, | Performed by: RADIOLOGY

## 2018-11-16 PROCEDURE — 70450 CT HEAD/BRAIN W/O DYE: CPT | Mod: TC
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
## 2018-11-17 NOTE — DISCHARGE INSTRUCTIONS
Return to ER for any change or worsening of condition.  Follow-up primary care provider in the next 2-3 days if any continued swelling, bruising or pain persists.  May apply ice to area to decrease pain and swelling. May take over-the-counter ibuprofen or Aleve as needed for discomfort.

## 2018-11-26 ENCOUNTER — OFFICE VISIT (OUTPATIENT)
Dept: PODIATRY | Facility: CLINIC | Age: 43
End: 2018-11-26
Payer: COMMERCIAL

## 2018-11-26 VITALS
DIASTOLIC BLOOD PRESSURE: 63 MMHG | BODY MASS INDEX: 25.73 KG/M2 | WEIGHT: 190 LBS | HEIGHT: 72 IN | HEART RATE: 79 BPM | TEMPERATURE: 97 F | SYSTOLIC BLOOD PRESSURE: 108 MMHG

## 2018-11-26 DIAGNOSIS — G89.29 CHRONIC PAIN OF RIGHT ANKLE: ICD-10-CM

## 2018-11-26 DIAGNOSIS — M72.2 PLANTAR FASCIITIS: ICD-10-CM

## 2018-11-26 DIAGNOSIS — G89.29 CHRONIC PAIN IN RIGHT FOOT: ICD-10-CM

## 2018-11-26 DIAGNOSIS — M19.071 PRIMARY OSTEOARTHRITIS OF RIGHT FOOT: Primary | ICD-10-CM

## 2018-11-26 DIAGNOSIS — M25.571 CHRONIC PAIN OF RIGHT ANKLE: ICD-10-CM

## 2018-11-26 DIAGNOSIS — M79.671 CHRONIC PAIN IN RIGHT FOOT: ICD-10-CM

## 2018-11-26 PROCEDURE — 99214 OFFICE O/P EST MOD 30 MIN: CPT | Mod: S$PBB,,, | Performed by: PODIATRIST

## 2018-11-26 PROCEDURE — 99213 OFFICE O/P EST LOW 20 MIN: CPT | Mod: PBBFAC | Performed by: PODIATRIST

## 2018-11-26 PROCEDURE — 99999 PR PBB SHADOW E&M-EST. PATIENT-LVL III: CPT | Mod: PBBFAC,,, | Performed by: PODIATRIST

## 2018-11-26 RX ORDER — OXYCODONE AND ACETAMINOPHEN 10; 325 MG/1; MG/1
1 TABLET ORAL 4 TIMES DAILY
Qty: 84 TABLET | Refills: 0 | Status: SHIPPED | OUTPATIENT
Start: 2018-11-26 | End: 2018-12-17

## 2018-11-28 NOTE — PROGRESS NOTES
Subjective:       Patient ID: Sg Sesay is a 42 y.o. male.    Chief Complaint: Follow-up; Foot Problem; and Foot Pain  Patient presents for followed up of continued right foot pain he states he has been actually having more pain in his left foot because of compensating.  Patient relates arch pain left continued pain right.  Patient indicates he is very frustrated he was previously ordered physical therapy for his right foot this has been denied by the [facility] however they have approved him for physical therapy at the same physical therapy facility for his back.  Patient relates he is going to require steroid injections for his back.  Foot Pain   Associated symptoms include joint swelling.      Review of Systems   Musculoskeletal: Positive for gait problem and joint swelling.   All other systems reviewed and are negative.      Objective:      Physical Exam   Constitutional: He appears well-developed and well-nourished.   Cardiovascular:   Pulses:       Dorsalis pedis pulses are 2+ on the right side, and 2+ on the left side.        Posterior tibial pulses are 2+ on the right side, and 2+ on the left side.   Pulmonary/Chest: Effort normal.   Musculoskeletal: He exhibits edema, tenderness and deformity.        Right foot: There is decreased range of motion and deformity.   Feet:   Right Foot:   Protective Sensation: 4 sites tested. 4 sites sensed.   Left Foot:   Protective Sensation: 4 sites tested. 4 sites sensed.   Neurological: He is alert.   Skin: Skin is warm. Capillary refill takes less than 2 seconds.   Psychiatric: He has a normal mood and affect. His behavior is normal. Judgment and thought content normal.   Nursing note and vitals reviewed.    On evaluation all the patient's incisions are healed patient has less inflammation than previously noted there are no skin breaks no active signs of infection however patient does have a limited range of motion there still some degree of pain primarily in the medial  portion of the right foot however overall the area has improved some nerve related discomfort is appreciated.    Patient is complaining about Achilles tendon pain and tightness at the back of his right heel there is limited dorsiflexion noted due to excessive tightness in this area.  Patient has findings consistent with plantar fascial pain left.  Assessment:       1. Primary osteoarthritis of right foot    2. Chronic pain of right ankle    3. Chronic pain in right foot    4. Plantar fasciitis - Left Foot        Plan:          Following evaluation patient has continued inflammation of the patient's right foot and ankle he is now having increased symptoms of plantar fasciitis on the left likely because of compensation I have advised the patient we need to build up his arch support on the left I have added a temporary adhesive felt arch support to the left on the right foot the patient indicated the lateral posting was helping to take pressure off the lateral portion of the right foot however this temporary padding had moved and shifted I therefore removed this temporary padding in added a quarter-inch core X padding to lateral post the patient's orthotics on the right foot I want to see how old this addresses the patient's discomfort that should be helpful in reducing the lateral discomfort that he is having.  Patient will be seen for follow-up in 1 month he is currently taking Lyrica I did review the patient's p.m. P before giving him a prescription for pain medication appropriate steps have been taken following [place] guidelines for prescribing of pain medication.  Patient is very frustrated I have ordered physical therapy multiple times with community physical therapy for the right foot this has been denied multiple times by the [facility] however they have recently approved the patient for physical therapy on his back at the same facility he is going to work to see if he can get them to approve his foot also so he  can have foot physical therapy done at the same time that his back is being done plan follow-up will be 1 month patient advised if he needs a new order for physical therapy to contact us.  Total face-to-face time including discussion evaluation treatment and adjustment of orthotics custom-molded equal 25 min.

## 2018-12-24 ENCOUNTER — OFFICE VISIT (OUTPATIENT)
Dept: PODIATRY | Facility: CLINIC | Age: 43
End: 2018-12-24
Payer: OTHER GOVERNMENT

## 2018-12-24 ENCOUNTER — HOSPITAL ENCOUNTER (OUTPATIENT)
Dept: RADIOLOGY | Facility: HOSPITAL | Age: 43
Discharge: HOME | End: 2018-12-24
Attending: PODIATRIST
Payer: OTHER GOVERNMENT

## 2018-12-24 VITALS
HEART RATE: 72 BPM | RESPIRATION RATE: 18 BRPM | WEIGHT: 190 LBS | SYSTOLIC BLOOD PRESSURE: 121 MMHG | TEMPERATURE: 98 F | DIASTOLIC BLOOD PRESSURE: 83 MMHG | BODY MASS INDEX: 25.77 KG/M2

## 2018-12-24 DIAGNOSIS — M19.071 PRIMARY OSTEOARTHRITIS OF RIGHT FOOT: ICD-10-CM

## 2018-12-24 DIAGNOSIS — G89.29 CHRONIC PAIN IN RIGHT FOOT: ICD-10-CM

## 2018-12-24 DIAGNOSIS — M72.2 PLANTAR FASCIITIS: ICD-10-CM

## 2018-12-24 DIAGNOSIS — M79.671 CHRONIC PAIN IN RIGHT FOOT: ICD-10-CM

## 2018-12-24 DIAGNOSIS — M79.671 CHRONIC PAIN IN RIGHT FOOT: Primary | ICD-10-CM

## 2018-12-24 DIAGNOSIS — M79.2 NEURITIS: ICD-10-CM

## 2018-12-24 DIAGNOSIS — G89.29 CHRONIC PAIN IN RIGHT FOOT: Primary | ICD-10-CM

## 2018-12-24 PROCEDURE — 73630 X-RAY EXAM OF FOOT: CPT | Mod: 26,RT,, | Performed by: RADIOLOGY

## 2018-12-24 PROCEDURE — 99213 OFFICE O/P EST LOW 20 MIN: CPT | Mod: PBBFAC,25 | Performed by: PODIATRIST

## 2018-12-24 PROCEDURE — 99214 OFFICE O/P EST MOD 30 MIN: CPT | Mod: S$PBB,,, | Performed by: PODIATRIST

## 2018-12-24 PROCEDURE — 73630 X-RAY EXAM OF FOOT: CPT | Mod: TC,FY,RT

## 2018-12-24 PROCEDURE — 99999 PR PBB SHADOW E&M-EST. PATIENT-LVL III: CPT | Mod: PBBFAC,,, | Performed by: PODIATRIST

## 2018-12-24 RX ORDER — OXYCODONE AND ACETAMINOPHEN 10; 325 MG/1; MG/1
1 TABLET ORAL EVERY 4 HOURS PRN
COMMUNITY
End: 2018-12-24 | Stop reason: SDUPTHER

## 2018-12-24 RX ORDER — OXYCODONE AND ACETAMINOPHEN 10; 325 MG/1; MG/1
1 TABLET ORAL EVERY 4 HOURS PRN
Qty: 120 TABLET | Refills: 0 | Status: SHIPPED | OUTPATIENT
Start: 2018-12-24 | End: 2019-01-23

## 2018-12-26 NOTE — PROGRESS NOTES
Subjective:       Patient ID: Sg Sesay is a 43 y.o. male.    Chief Complaint: Follow-up (plantar fasciitis)  Patient presents for followed up of continued right foot pain he states he has been actually having more pain in his left foot because of compensating.  Patient relates arch pain left continued pain right.  Patient indicates he is very frustrated he was previously ordered physical therapy for his right foot this has been denied by the [facility] however they have approved him for physical therapy at the same physical therapy facility for his back.  Patient relates he is going to require steroid injections for his back.  Patient relates the physical therapy is not really given him a lot of relief in his back he still has not been approved for physical therapy on his feet.  Foot Pain   Associated symptoms include joint swelling.      Review of Systems   Musculoskeletal: Positive for gait problem and joint swelling.   All other systems reviewed and are negative.      Objective:      Physical Exam   Constitutional: He appears well-developed and well-nourished.   Cardiovascular:   Pulses:       Dorsalis pedis pulses are 2+ on the right side, and 2+ on the left side.        Posterior tibial pulses are 2+ on the right side, and 2+ on the left side.   Pulmonary/Chest: Effort normal.   Musculoskeletal: He exhibits edema, tenderness and deformity.        Right foot: There is decreased range of motion and deformity.   Feet:   Right Foot:   Protective Sensation: 4 sites tested. 4 sites sensed.   Left Foot:   Protective Sensation: 4 sites tested. 4 sites sensed.   Neurological: He is alert.   Skin: Skin is warm. Capillary refill takes less than 2 seconds.   Psychiatric: He has a normal mood and affect. His behavior is normal. Judgment and thought content normal.   Nursing note and vitals reviewed.    On evaluation all the patient's incisions are healed patient has less inflammation than previously noted there are no skin  breaks no active signs of infection however patient does have a limited range of motion there still some degree of pain primarily in the medial portion of the right foot however overall the area has improved some nerve related discomfort is appreciated.    Patient is complaining about Achilles tendon pain and tightness at the back of his right heel there is limited dorsiflexion noted due to excessive tightness in this area.  Patient has findings consistent with plantar fascial pain left.  Patient also has significant discomfort with associated inflammation in the region of the cuboid and base of the 5th metatarsal right.  Concern for cuboid syndrome noted.  Assessment:       1. Chronic pain in right foot    2. Primary osteoarthritis of right foot    3. Plantar fasciitis - Left Foot    4. Neuritis        Plan:          Patient presents today for follow-up of bilateral foot pain he relates the plantar fasciitis in the left foot is getting progressively worse he still having pain at the plantar fascial area of the right but now he is having even more pain at the base of the 5th metatarsal and the cuboid bone this raises concern for cuboid syndrome.  I had previously put lateral posting on the right orthotic for the patient he states he is not sure that this is really helping I have recommended completely changing gears with this patient I dispensed the patient a very mild over-the-counter power step full length arch support I want to see how the patient responds to these I am concerned that with this apparent cuboid syndrome it is causing increased pressure on the lateral portion of the right foot causing his discomfort.  Patient is still awaiting approval from the [facility] to start physical therapy on both lower extremities he has been getting physical therapy for his back which he states really has not been helping him patient states the bilateral foot pain has gotten very run down and discouraged and it is almost constant  at this point. Patient will be seen for follow-up in 1 month he is currently taking Lyrica I did review the patient's p.m. P before giving him a prescription for pain medication appropriate steps have been taken following [place] guidelines for prescribing of pain medication.    Total face-to-face time including discussion evaluation treatment and discussion of conservative treatment bilateral equal 25 min this includes fitting the patient for appropriate over-the-counter arch supports and discussing the treatment plan in detail with the patient today I did also discuss the patient's x-rays in detail patient has degenerative spurring at the base of the 5th metatarsal right there is also an ossicle that appears at the cuboid bone it is difficult to determine whether this is an ossicle or a fracture degenerative spur however the patient has significant peroneal tendinitis in this region also.

## 2019-01-28 ENCOUNTER — TELEPHONE (OUTPATIENT)
Dept: PODIATRY | Facility: CLINIC | Age: 44
End: 2019-01-28

## 2019-01-28 ENCOUNTER — OFFICE VISIT (OUTPATIENT)
Dept: PODIATRY | Facility: CLINIC | Age: 44
End: 2019-01-28
Payer: OTHER GOVERNMENT

## 2019-01-28 ENCOUNTER — LAB VISIT (OUTPATIENT)
Dept: LAB | Facility: HOSPITAL | Age: 44
End: 2019-01-28
Attending: PODIATRIST
Payer: COMMERCIAL

## 2019-01-28 VITALS
SYSTOLIC BLOOD PRESSURE: 131 MMHG | HEART RATE: 74 BPM | DIASTOLIC BLOOD PRESSURE: 85 MMHG | WEIGHT: 190 LBS | HEIGHT: 72 IN | BODY MASS INDEX: 25.73 KG/M2 | TEMPERATURE: 98 F

## 2019-01-28 DIAGNOSIS — G89.29 CHRONIC PAIN OF RIGHT ANKLE: ICD-10-CM

## 2019-01-28 DIAGNOSIS — M72.2 PLANTAR FASCIITIS: ICD-10-CM

## 2019-01-28 DIAGNOSIS — M25.571 CHRONIC PAIN OF RIGHT ANKLE: ICD-10-CM

## 2019-01-28 DIAGNOSIS — M79.2 NEURITIS: ICD-10-CM

## 2019-01-28 DIAGNOSIS — M19.071 PRIMARY OSTEOARTHRITIS OF RIGHT FOOT: ICD-10-CM

## 2019-01-28 DIAGNOSIS — S93.311A CUBOID SYNDROME OF RIGHT FOOT: ICD-10-CM

## 2019-01-28 DIAGNOSIS — G89.29 CHRONIC PAIN IN RIGHT FOOT: ICD-10-CM

## 2019-01-28 DIAGNOSIS — M72.2 PLANTAR FASCIITIS: Primary | ICD-10-CM

## 2019-01-28 DIAGNOSIS — M79.671 CHRONIC PAIN IN RIGHT FOOT: ICD-10-CM

## 2019-01-28 LAB
AMPHET+METHAMPHET UR QL: NEGATIVE
BARBITURATES UR QL SCN>200 NG/ML: NEGATIVE
BENZODIAZ UR QL SCN>200 NG/ML: NEGATIVE
BZE UR QL SCN: NEGATIVE
CANNABINOIDS UR QL SCN: NEGATIVE
CREAT UR-MCNC: 283.5 MG/DL
OPIATES UR QL SCN: NORMAL
PCP UR QL SCN>25 NG/ML: NEGATIVE
TOXICOLOGY INFORMATION: NORMAL

## 2019-01-28 PROCEDURE — 99213 PR OFFICE/OUTPT VISIT, EST, LEVL III, 20-29 MIN: ICD-10-PCS | Mod: S$PBB,,, | Performed by: PODIATRIST

## 2019-01-28 PROCEDURE — 99999 PR PBB SHADOW E&M-EST. PATIENT-LVL III: CPT | Mod: PBBFAC,,, | Performed by: PODIATRIST

## 2019-01-28 PROCEDURE — 80307 DRUG TEST PRSMV CHEM ANLYZR: CPT

## 2019-01-28 PROCEDURE — 99213 OFFICE O/P EST LOW 20 MIN: CPT | Mod: S$PBB,,, | Performed by: PODIATRIST

## 2019-01-28 PROCEDURE — 99213 OFFICE O/P EST LOW 20 MIN: CPT | Mod: PBBFAC | Performed by: PODIATRIST

## 2019-01-28 PROCEDURE — 99999 PR PBB SHADOW E&M-EST. PATIENT-LVL III: ICD-10-PCS | Mod: PBBFAC,,, | Performed by: PODIATRIST

## 2019-01-28 RX ORDER — OXYCODONE AND ACETAMINOPHEN 10; 325 MG/1; MG/1
1 TABLET ORAL EVERY 4 HOURS PRN
Qty: 120 TABLET | Refills: 0 | Status: SHIPPED | OUTPATIENT
Start: 2019-01-28 | End: 2019-02-27 | Stop reason: SDUPTHER

## 2019-01-28 RX ORDER — OXYCODONE AND ACETAMINOPHEN 10; 325 MG/1; MG/1
1 TABLET ORAL EVERY 4 HOURS PRN
COMMUNITY
End: 2019-01-28 | Stop reason: SDUPTHER

## 2019-01-28 NOTE — TELEPHONE ENCOUNTER
----- Message from Jah Torres DPM sent at 1/28/2019 10:01 AM CST -----  Please call the patient regarding his abnormal result.advise drug screen fine

## 2019-02-02 NOTE — PROGRESS NOTES
Subjective:       Patient ID: Sg Sesay is a 43 y.o. male.    Chief Complaint: Follow-up; Foot Pain; and Foot Problem  Patient presents for followed up of continued right foot pain he states he has been actually having more pain in his left foot because of compensating.  Patient relates arch pain left continued pain right.  Patient indicates he is very frustrated he was previously ordered physical therapy for his right foot [facility] however [facility] have [facility] him for physical therapy at the same physical therapy facility for his back.  Patient relates he is going to require steroid injections for his back.  Patient relates the physical therapy is not really given him a lot of relief in his back he still has not been approved for physical therapy on his feet.  Foot Pain   Associated symptoms include joint swelling.      Review of Systems   Musculoskeletal: Positive for gait problem and joint swelling.   All other systems reviewed and are negative.      Objective:      Physical Exam   Constitutional: He appears well-developed and well-nourished.   Cardiovascular:   Pulses:       Dorsalis pedis pulses are 2+ on the right side, and 2+ on the left side.        Posterior tibial pulses are 2+ on the right side, and 2+ on the left side.   Pulmonary/Chest: Effort normal.   Musculoskeletal: He exhibits edema, tenderness and deformity.        Right foot: There is decreased range of motion and deformity.   Feet:   Right Foot:   Protective Sensation: 4 sites tested. 4 sites sensed.   Left Foot:   Protective Sensation: 4 sites tested. 4 sites sensed.   Neurological: He is alert.   Skin: Skin is warm. Capillary refill takes less than 2 seconds.   Psychiatric: He has a normal mood and affect. His behavior is normal. Judgment and thought content normal.   Nursing note and vitals reviewed.    On evaluation all the patient's incisions are healed patient has less inflammation than previously noted there  are no skin breaks no active signs of infection however patient does have a limited range of motion there still some degree of pain primarily in the medial portion of the right foot however overall the area has improved some nerve related discomfort is appreciated.    Patient is complaining about Achilles tendon pain and tightness at the back of his right heel there is limited dorsiflexion noted due to excessive tightness in this area.  Patient has findings consistent with plantar fascial pain left.  Patient also has significant discomfort with associated inflammation in the region of the cuboid and base of the 5th metatarsal right.  Concern for cuboid syndrome noted.  Assessment:       1. Plantar fasciitis - Left Foot    2. Primary osteoarthritis of right foot    3. Chronic pain of right ankle    4. Chronic pain in right foot    5. Neuritis        Plan:          Patient presents today for follow-up of bilateral foot pain he relates the plantar fasciitis in the left foot is getting progressively worse he still having pain at the plantar fascial area of the right but now he is having even more pain at the base of the 5th metatarsal and the cuboid bone this raises concern for cuboid syndrome.  I had previously put lateral posting on the right orthotic for the patient he states he is not sure that this is really helping I have recommended completely changing gears with this patient I dispensed the patient a very mild over-the-counter power step full length arch support I want to see how the patient responds to these I am concerned that with this apparent cuboid syndrome it is causing increased pressure on the lateral portion of the right foot causing his discomfort.  [facility] is still [facility] on both lower extremities he has been getting physical therapy for his back which he states really has not been helping him patient states the bilateral foot pain has gotten very run  down and discouraged and it is almost constant at this point. Patient will be seen for follow-up in 1 month he is currently taking Lyrica I did review the patient's p.m. P before giving him a prescription for pain medication appropriate steps have been taken following [place] guidelines for prescribing of pain medication.    Total face-to-face time including discussion evaluation treatment and discussion of conservative treatment bilateral equal 20 min this includes fitting the patient for appropriate over-the-counter arch supports and discussing the treatment plan in detail with the patient today.  Patient stated that it felt as if the over-the-counter arch supports were pushing up a little bit too much in is support in the arch area.  I did advise the patient depending upon how he is responding to the changes made to his arch supports I will consider possibly remaking his orthotics to better suit the patient I did put an adhesive felt pad under the cuboid right to address the cuboid syndrome syndrome that the patient is having.  I will see the patient for follow-up in 1 month further evaluate him extra cuboid pads were dispensed to the patient we can also discuss possible injections regarding the patient's heel pain on the right foot patient is continuing to have heel pain on the left likely due to compensation patient indicated he is going to stay on the [facility] about getting his [facility] for physical therapy which has been significantly delayed and hamper the patient's recovery.  Drug screen was ordered for the patient today and was satisfactory this is in compliance with the pain management I am providing for the patient.

## 2019-02-27 ENCOUNTER — OFFICE VISIT (OUTPATIENT)
Dept: PODIATRY | Facility: CLINIC | Age: 44
End: 2019-02-27
Payer: OTHER GOVERNMENT

## 2019-02-27 VITALS
DIASTOLIC BLOOD PRESSURE: 74 MMHG | WEIGHT: 200 LBS | BODY MASS INDEX: 27.09 KG/M2 | TEMPERATURE: 97 F | HEIGHT: 72 IN | SYSTOLIC BLOOD PRESSURE: 117 MMHG | HEART RATE: 75 BPM

## 2019-02-27 DIAGNOSIS — M72.2 PLANTAR FASCIITIS: ICD-10-CM

## 2019-02-27 DIAGNOSIS — M79.671 CHRONIC PAIN IN RIGHT FOOT: Primary | ICD-10-CM

## 2019-02-27 DIAGNOSIS — G57.51 TARSAL TUNNEL SYNDROME OF RIGHT SIDE: ICD-10-CM

## 2019-02-27 DIAGNOSIS — M54.41 CHRONIC BILATERAL LOW BACK PAIN WITH BILATERAL SCIATICA: ICD-10-CM

## 2019-02-27 DIAGNOSIS — M79.2 NEURITIS: ICD-10-CM

## 2019-02-27 DIAGNOSIS — M54.42 CHRONIC BILATERAL LOW BACK PAIN WITH BILATERAL SCIATICA: ICD-10-CM

## 2019-02-27 DIAGNOSIS — G89.29 CHRONIC BILATERAL LOW BACK PAIN WITH BILATERAL SCIATICA: ICD-10-CM

## 2019-02-27 DIAGNOSIS — G89.29 CHRONIC PAIN IN RIGHT FOOT: Primary | ICD-10-CM

## 2019-02-27 DIAGNOSIS — M19.071 PRIMARY OSTEOARTHRITIS OF RIGHT FOOT: ICD-10-CM

## 2019-02-27 PROCEDURE — 99999 PR PBB SHADOW E&M-EST. PATIENT-LVL III: ICD-10-PCS | Mod: PBBFAC,,, | Performed by: PODIATRIST

## 2019-02-27 PROCEDURE — 99213 PR OFFICE/OUTPT VISIT, EST, LEVL III, 20-29 MIN: ICD-10-PCS | Mod: S$PBB,,, | Performed by: PODIATRIST

## 2019-02-27 PROCEDURE — 99999 PR PBB SHADOW E&M-EST. PATIENT-LVL III: CPT | Mod: PBBFAC,,, | Performed by: PODIATRIST

## 2019-02-27 PROCEDURE — 99213 OFFICE O/P EST LOW 20 MIN: CPT | Mod: PBBFAC | Performed by: PODIATRIST

## 2019-02-27 PROCEDURE — 99213 OFFICE O/P EST LOW 20 MIN: CPT | Mod: S$PBB,,, | Performed by: PODIATRIST

## 2019-02-27 RX ORDER — OXYCODONE AND ACETAMINOPHEN 10; 325 MG/1; MG/1
1 TABLET ORAL EVERY 4 HOURS PRN
Qty: 120 TABLET | Refills: 0 | Status: SHIPPED | OUTPATIENT
Start: 2019-02-27 | End: 2019-03-27 | Stop reason: SDUPTHER

## 2019-02-27 RX ORDER — DIMETHYL FUMARATE 240 MG/1
240 CAPSULE ORAL 2 TIMES DAILY
COMMUNITY
End: 2020-10-07

## 2019-03-03 NOTE — PROGRESS NOTES
Subjective:       Patient ID: Sg Sesay is a 43 y.o. male.    Chief Complaint: Follow-up; Foot Problem; Foot Pain; and Ankle Pain  Patient presents for followed up of continued right foot pain he states he has been actually having more pain in his left foot because of compensating.  Patient relates arch pain left continued pain right.  Patient indicates he is very frustrated he was previously ordered physical therapy for his right foot [facility] however they have [facility] him for physical therapy at the same physical therapy facility for his back.  Patient relates he is going to require steroid injections for his back.  Patient relates the physical therapy is not really given him a lot of relief in his back he still has not been approved for physical therapy on his feet.  Foot Pain   Associated symptoms include joint swelling.   Ankle Pain         Review of Systems   Musculoskeletal: Positive for gait problem and joint swelling.   All other systems reviewed and are negative.      Objective:      Physical Exam   Constitutional: He appears well-developed and well-nourished.   Cardiovascular:   Pulses:       Dorsalis pedis pulses are 2+ on the right side, and 2+ on the left side.        Posterior tibial pulses are 2+ on the right side, and 2+ on the left side.   Pulmonary/Chest: Effort normal.   Musculoskeletal: He exhibits edema, tenderness and deformity.        Right foot: There is decreased range of motion and deformity.   Feet:   Right Foot:   Protective Sensation: 4 sites tested. 4 sites sensed.   Left Foot:   Protective Sensation: 4 sites tested. 4 sites sensed.   Neurological: He is alert.   Skin: Skin is warm. Capillary refill takes less than 2 seconds.   Psychiatric: He has a normal mood and affect. His behavior is normal. Judgment and thought content normal.   Nursing note and vitals reviewed.    On evaluation all the patient's incisions are healed patient has less inflammation  than previously noted there are no skin breaks no active signs of infection however patient does have a limited range of motion there still some degree of pain primarily in the medial portion of the right foot however overall the area has improved some nerve related discomfort is appreciated.    Patient is complaining about Achilles tendon pain and tightness at the back of his right heel there is limited dorsiflexion noted due to excessive tightness in this area.  Patient has findings consistent with plantar fascial pain left.  Patient also has significant discomfort with associated inflammation in the region of the cuboid and base of the 5th metatarsal right.  Concern for cuboid syndrome noted.  Assessment:       1. Chronic pain in right foot    2. Tarsal tunnel syndrome of right side    3. Neuritis    4. Primary osteoarthritis of right foot    5. Plantar fasciitis - Left Foot    6. Chronic bilateral low back pain with bilateral sciatica        Plan:          Patient presents today for follow-up of bilateral foot pain he relates the plantar fasciitis in the left foot is getting progressively worse he still having pain at the plantar fascial area of the right but now he is having even more pain at the base of the 5th metatarsal and the cuboid bone this raises concern for cuboid syndrome.  I had previously applied a cuboid pad to stabilize the cuboid on the lateral aspect of the patient's right foot he states he did think it helped however it was shifting and moving because it was temporarily adhere to this position he states he tried to replace it several times but never get back in the correct position. Patient will be seen for follow-up in 1 month he is currently taking Lyrica I did review the patient's p.m. P before giving him a prescription for pain medication appropriate steps have been taken following [place] guidelines for prescribing of pain medication.    I did apply a more permanent pad to support the  cuboid as the patient is having subluxation and pressure overlying the cuboid lateral right foot patient immediately stated that it felt better when he put his shoes back on with the new padding I am going to see how this helps the patient at reducing the pain on the lateral portion of the right foot patient has had chronic pain on the medial portion and through the tarsal tunnel area right patient is having a lot of nerve pain in this area likely along the course of the Phan's nerve I did discuss the patient has a last resort performing a nerve ablation procedure in this area I have advised him this has been very successful in the past as an end-stage procedure when the nerve pain cannot be controlled in the specific area patient has had multiple surgeries along the tarsal tunnel area plantar fascial area by different physicians and the patient has had continued nerve pain in this area he is going to consider the discussion today we had regarding a nerve ablation of this region.  Patient states he did have a nerve ablation procedure coincidentally in his back last Friday he has not gotten any relief from this at all I did discuss with the patient referring him to pain management provider Dr. Villarreal who provides interventional treatment and may be able to help him with his back patient states none of the treatment he has received so far have been helpful.  Patient is having increased pain at the plantar fascial insertion left this is likely related to the patient compensating due to the right foot putting excessive pressure on the left I am going to see how the patient does as follow-up in 1 month how he responds to this cuboid pad he is going to think about the ablation procedure that we discussed.  Referral put in to Dr. Villarreal patient advised if he gets any relief at all on the right foot with adjustments to his arch supports we can make permanent adjustments.  Patient is under my care and is under a pain  management contract his drug screen is up-to-date.

## 2019-03-13 RX ORDER — PREGABALIN 150 MG/1
CAPSULE ORAL
Qty: 90 CAPSULE | Refills: 5 | Status: SHIPPED | OUTPATIENT
Start: 2019-03-13 | End: 2019-09-16 | Stop reason: SDUPTHER

## 2019-03-20 ENCOUNTER — TELEPHONE (OUTPATIENT)
Dept: PAIN MEDICINE | Facility: CLINIC | Age: 44
End: 2019-03-20

## 2019-03-26 ENCOUNTER — TELEPHONE (OUTPATIENT)
Dept: PAIN MEDICINE | Facility: CLINIC | Age: 44
End: 2019-03-26

## 2019-03-26 NOTE — TELEPHONE ENCOUNTER
Patient arrived for appointment with Dr. Villarreal, advised that he is receiving pain management services with Dr. PRASANNA Sevilla in [place], [place]. Patient reports that he is satisfied with his care with this provider and Dr. JONNY Torres for any medication intervention needs. Patient was not seen by Dr. Villarreal and appointment was canceled.

## 2019-03-27 ENCOUNTER — OFFICE VISIT (OUTPATIENT)
Dept: PODIATRY | Facility: CLINIC | Age: 44
End: 2019-03-27
Payer: OTHER GOVERNMENT

## 2019-03-27 VITALS
TEMPERATURE: 98 F | DIASTOLIC BLOOD PRESSURE: 82 MMHG | WEIGHT: 200 LBS | SYSTOLIC BLOOD PRESSURE: 134 MMHG | HEART RATE: 78 BPM | BODY MASS INDEX: 27.09 KG/M2 | HEIGHT: 72 IN

## 2019-03-27 DIAGNOSIS — M79.2 NEURITIS: ICD-10-CM

## 2019-03-27 DIAGNOSIS — M72.2 PLANTAR FASCIITIS: ICD-10-CM

## 2019-03-27 DIAGNOSIS — G57.51 TARSAL TUNNEL SYNDROME OF RIGHT SIDE: Primary | ICD-10-CM

## 2019-03-27 DIAGNOSIS — G89.29 CHRONIC BILATERAL LOW BACK PAIN WITH BILATERAL SCIATICA: ICD-10-CM

## 2019-03-27 DIAGNOSIS — M19.071 PRIMARY OSTEOARTHRITIS OF RIGHT FOOT: ICD-10-CM

## 2019-03-27 DIAGNOSIS — M54.41 CHRONIC BILATERAL LOW BACK PAIN WITH BILATERAL SCIATICA: ICD-10-CM

## 2019-03-27 DIAGNOSIS — M54.42 CHRONIC BILATERAL LOW BACK PAIN WITH BILATERAL SCIATICA: ICD-10-CM

## 2019-03-27 PROCEDURE — 99213 PR OFFICE/OUTPT VISIT, EST, LEVL III, 20-29 MIN: ICD-10-PCS | Mod: S$PBB,,, | Performed by: PODIATRIST

## 2019-03-27 PROCEDURE — 99999 PR PBB SHADOW E&M-EST. PATIENT-LVL III: CPT | Mod: PBBFAC,,, | Performed by: PODIATRIST

## 2019-03-27 PROCEDURE — 99213 OFFICE O/P EST LOW 20 MIN: CPT | Mod: PBBFAC | Performed by: PODIATRIST

## 2019-03-27 PROCEDURE — 99999 PR PBB SHADOW E&M-EST. PATIENT-LVL III: ICD-10-PCS | Mod: PBBFAC,,, | Performed by: PODIATRIST

## 2019-03-27 PROCEDURE — 99213 OFFICE O/P EST LOW 20 MIN: CPT | Mod: S$PBB,,, | Performed by: PODIATRIST

## 2019-03-27 RX ORDER — OXYCODONE AND ACETAMINOPHEN 10; 325 MG/1; MG/1
1 TABLET ORAL EVERY 4 HOURS PRN
Qty: 120 TABLET | Refills: 0 | Status: SHIPPED | OUTPATIENT
Start: 2019-03-27 | End: 2019-04-26

## 2019-03-30 NOTE — PROGRESS NOTES
Subjective:       Patient ID: Sg Sesay is a 43 y.o. male.    Chief Complaint: Foot Pain and Foot Problem  Patient presents for followed up of continued right foot pain he states he has been actually having more pain in his left foot because of compensating.  Patient relates arch pain left continued pain right.  Patient indicates he is very frustrated he was previously ordered physical therapy for his right foot [facility] however they have [facility] him for physical therapy at the same physical therapy facility for his back.  Patient relates he is going to require steroid injections for his back. Foot Pain   Associated symptoms include joint swelling.   Ankle Pain         Review of Systems   Musculoskeletal: Positive for gait problem and joint swelling.   All other systems reviewed and are negative.      Objective:      Physical Exam   Constitutional: He appears well-developed and well-nourished.   Cardiovascular:   Pulses:       Dorsalis pedis pulses are 2+ on the right side, and 2+ on the left side.        Posterior tibial pulses are 2+ on the right side, and 2+ on the left side.   Pulmonary/Chest: Effort normal.   Musculoskeletal: He exhibits edema, tenderness and deformity.        Right foot: There is decreased range of motion and deformity.   Feet:   Right Foot:   Protective Sensation: 4 sites tested. 4 sites sensed.   Left Foot:   Protective Sensation: 4 sites tested. 4 sites sensed.   Neurological: He is alert.   Skin: Skin is warm. Capillary refill takes less than 2 seconds.   Psychiatric: He has a normal mood and affect. His behavior is normal. Judgment and thought content normal.   Nursing note and vitals reviewed.    On evaluation all the patient's incisions are healed patient has less inflammation than previously noted there are no skin breaks no active signs of infection however patient does have a limited range of motion there still some degree of pain primarily in the  medial portion of the right foot however overall the area has improved some nerve related discomfort is appreciated.    Patient is complaining about Achilles tendon pain and tightness at the back of his right heel there is limited dorsiflexion noted due to excessive tightness in this area.  Patient has findings consistent with plantar fascial pain left.  Patient also has significant discomfort with associated inflammation in the region of the cuboid and base of the 5th metatarsal right.  Concern for cuboid syndrome noted.  Assessment:       1. Tarsal tunnel syndrome of right side    2. Neuritis    3. Plantar fasciitis - Left Foot    4. Primary osteoarthritis of right foot    5. Chronic bilateral low back pain with bilateral sciatica        Plan:          Patient presents today for follow-up of bilateral foot pain he relates the plantar fasciitis in the left foot is getting progressively worse he still having pain at the plantar fascial area of the right but now he is having even more pain at the base of the 5th metatarsal and the cuboid bone this raises concern for cuboid syndrome.  Patient states the cuboid pad that I had put in his right shoe was actually causing too much pressure he had to cut it and make it a little bit thinner but ultimately he states it is not really helping.  Patient is having increased pain at the plantar fascial insertion left this is likely related to the patient compensating due to the right foot putting excessive pressure on the left I am going to see how the patient does as follow-up in 1 month.  Patient indicated today the cuboid area on his right foot is not really his primary area of discomfort the greatest area that he is having discomfort is along the course of the Phan's nerve and tarsal tunnel left he states he feels as if this was not causing him any discomfort he could handle the pain on the outside of the right foot patient had question me possibly doing a nerve ablation on  the left foot also I have advised him we can only do 1 foot at a time and certainly the right foot is more problematic I have made him aware that I would not recommend the nerve ablation on the left foot but a plantar fascial release with respect shin resection of the heel spur would likely eliminate the pain he is having in the left foot patient was in understanding and agreement we have discussed at length and I provided surgical consultation with the patient regarding a nerve ablation in the tarsal tunnel area right.  Patient misunderstood I had referred the patient Dr. Villarreal for pain management however he has already seeing somebody for pain management there was some confusion so he did not have to see Dr. Villarreal as referred.  Patient indicated today he has been having these problems with his feet ever since 2011.  Patient's BMP was evaluated prior to dispensing the patient pain medication he is under my care for pain management.  Patient is under my care and is under a pain management contract his drug screen is up-to-date.

## 2019-04-29 ENCOUNTER — OFFICE VISIT (OUTPATIENT)
Dept: PODIATRY | Facility: CLINIC | Age: 44
End: 2019-04-29
Payer: MEDICARE

## 2019-04-29 ENCOUNTER — LAB VISIT (OUTPATIENT)
Dept: LAB | Facility: HOSPITAL | Age: 44
End: 2019-04-29
Attending: PODIATRIST
Payer: MEDICARE

## 2019-04-29 ENCOUNTER — TELEPHONE (OUTPATIENT)
Dept: PODIATRY | Facility: CLINIC | Age: 44
End: 2019-04-29

## 2019-04-29 VITALS
DIASTOLIC BLOOD PRESSURE: 76 MMHG | HEIGHT: 72 IN | BODY MASS INDEX: 26.41 KG/M2 | HEART RATE: 78 BPM | WEIGHT: 195 LBS | TEMPERATURE: 98 F | SYSTOLIC BLOOD PRESSURE: 128 MMHG

## 2019-04-29 DIAGNOSIS — G89.29 CHRONIC PAIN OF RIGHT ANKLE: ICD-10-CM

## 2019-04-29 DIAGNOSIS — G89.29 CHRONIC PAIN IN RIGHT FOOT: ICD-10-CM

## 2019-04-29 DIAGNOSIS — M72.2 PLANTAR FASCIITIS: ICD-10-CM

## 2019-04-29 DIAGNOSIS — M25.571 CHRONIC PAIN OF RIGHT ANKLE: ICD-10-CM

## 2019-04-29 DIAGNOSIS — G57.51 TARSAL TUNNEL SYNDROME OF RIGHT SIDE: ICD-10-CM

## 2019-04-29 DIAGNOSIS — M19.071 PRIMARY OSTEOARTHRITIS OF RIGHT FOOT: ICD-10-CM

## 2019-04-29 DIAGNOSIS — M79.2 NEURITIS: ICD-10-CM

## 2019-04-29 DIAGNOSIS — G57.51 TARSAL TUNNEL SYNDROME OF RIGHT SIDE: Primary | ICD-10-CM

## 2019-04-29 DIAGNOSIS — M79.671 CHRONIC PAIN IN RIGHT FOOT: ICD-10-CM

## 2019-04-29 LAB
AMPHET+METHAMPHET UR QL: NEGATIVE
BARBITURATES UR QL SCN>200 NG/ML: NEGATIVE
BENZODIAZ UR QL SCN>200 NG/ML: NEGATIVE
BZE UR QL SCN: NEGATIVE
CANNABINOIDS UR QL SCN: NEGATIVE
CREAT UR-MCNC: 281.7 MG/DL (ref 23–375)
OPIATES UR QL SCN: NORMAL
PCP UR QL SCN>25 NG/ML: NEGATIVE
TOXICOLOGY INFORMATION: NORMAL

## 2019-04-29 PROCEDURE — 80307 DRUG TEST PRSMV CHEM ANLYZR: CPT

## 2019-04-29 PROCEDURE — 99213 OFFICE O/P EST LOW 20 MIN: CPT | Mod: S$PBB,,, | Performed by: PODIATRIST

## 2019-04-29 PROCEDURE — 99213 OFFICE O/P EST LOW 20 MIN: CPT | Mod: PBBFAC | Performed by: PODIATRIST

## 2019-04-29 PROCEDURE — 99999 PR PBB SHADOW E&M-EST. PATIENT-LVL III: CPT | Mod: PBBFAC,,, | Performed by: PODIATRIST

## 2019-04-29 PROCEDURE — 99213 PR OFFICE/OUTPT VISIT, EST, LEVL III, 20-29 MIN: ICD-10-PCS | Mod: S$PBB,,, | Performed by: PODIATRIST

## 2019-04-29 PROCEDURE — 99999 PR PBB SHADOW E&M-EST. PATIENT-LVL III: ICD-10-PCS | Mod: PBBFAC,,, | Performed by: PODIATRIST

## 2019-04-29 RX ORDER — OXYCODONE AND ACETAMINOPHEN 10; 325 MG/1; MG/1
1 TABLET ORAL EVERY 4 HOURS PRN
COMMUNITY
End: 2019-04-29 | Stop reason: SDUPTHER

## 2019-04-29 RX ORDER — OXYCODONE AND ACETAMINOPHEN 10; 325 MG/1; MG/1
1 TABLET ORAL 4 TIMES DAILY
Qty: 120 TABLET | Refills: 0 | Status: SHIPPED | OUTPATIENT
Start: 2019-04-29 | End: 2019-05-29

## 2019-04-29 NOTE — TELEPHONE ENCOUNTER
----- Message from Jah Torres DPM sent at 4/29/2019  3:40 PM CDT -----  Please call the patient regarding his abnormal result.Advise drug screen fine rx sent.

## 2019-04-29 NOTE — LETTER
May 1, 2019      [facility]  [address]  [place] [address]           [facility]  [address] [place] [address]  Phone: [phone]  Fax: [phone]          Patient: Sg Sesay   MR Number: [id number]   YOB: 1975   Date of Visit: 4/29/2019       Dear [facility]:    Thank you for referring Sg Sesay to me for evaluation. Attached you will find relevant portions of my assessment and plan of care.    If you have questions, please do not hesitate to call me. I look forward to following Sg Sesay along with you.    Sincerely,    Jah Torres, LISANDRO    Enclosure  CC:  No Recipients    If you would like to receive this communication electronically, please contact [email or web address] or [phone] to request more information on [employer] Link access.    For providers and/or their staff who would like to refer a patient to [facility], please contact us through our one-stop-shop provider referral line, [facility], at [phone].    If you feel you have received this communication in error or would no longer like to receive these types of communications, please e-mail [email or web address]

## 2019-05-01 NOTE — PROGRESS NOTES
Subjective:       Patient ID: Sg Sesay is a 43 y.o. male.    Chief Complaint: Follow-up; Foot Pain; and Foot Problem  Patient presents for followed up of continued right foot pain he states he has been actually having more pain in his left foot because of compensating.  Patient relates arch pain left continued pain right.  Patient indicates he is very frustrated he was previously ordered physical therapy for his right foot [facility] however they have [facility] him for physical therapy at the same physical therapy facility for his back.  Patient relates he is going to require steroid injections for his back. Foot Pain   Associated symptoms include joint swelling.   Ankle Pain         Review of Systems   Musculoskeletal: Positive for gait problem and joint swelling.   All other systems reviewed and are negative.      Objective:      Physical Exam   Constitutional: He appears well-developed and well-nourished.   Cardiovascular:   Pulses:       Dorsalis pedis pulses are 2+ on the right side, and 2+ on the left side.        Posterior tibial pulses are 2+ on the right side, and 2+ on the left side.   Pulmonary/Chest: Effort normal.   Musculoskeletal: He exhibits edema, tenderness and deformity.        Right foot: There is decreased range of motion and deformity.   Feet:   Right Foot:   Protective Sensation: 4 sites tested. 4 sites sensed.   Left Foot:   Protective Sensation: 4 sites tested. 4 sites sensed.   Neurological: He is alert.   Skin: Skin is warm. Capillary refill takes less than 2 seconds.   Psychiatric: He has a normal mood and affect. His behavior is normal. Judgment and thought content normal.   Nursing note and vitals reviewed.    On evaluation all the patient's incisions are healed patient has less inflammation than previously noted there are no skin breaks no active signs of infection however patient does have a limited range of motion there still some degree of pain primarily  in the medial portion of the right foot however overall the area has improved some nerve related discomfort is appreciated.    Patient is complaining about Achilles tendon pain and tightness at the back of his right heel there is limited dorsiflexion noted due to excessive tightness in this area.  Patient has findings consistent with plantar fascial pain left.  Patient also has significant discomfort with associated inflammation in the region of the cuboid and base of the 5th metatarsal right.  Concern for cuboid syndrome noted.  Assessment:       1. Tarsal tunnel syndrome of right side    2. Plantar fasciitis - Left Foot    3. Primary osteoarthritis of right foot    4. Chronic pain of right ankle    5. Chronic pain in right foot    6. Neuritis        Plan:          Patient presents today for follow-up of bilateral foot pain he relates the plantar fasciitis in the left foot is getting progressively worse he still having pain at the plantar fascial area of the right but now he is having even more pain at the base of the 5th metatarsal and the cuboid bone this raises concern for cuboid syndrome.  Patient states the cuboid pad that I had put in his right shoe was actually causing too much pressure he had to cut it and make it a little bit thinner but ultimately he states it is not really helping.  Patient is having increased pain at the plantar fascial insertion left this is likely related to the patient compensating due to the right foot putting excessive pressure on the left I am going to see how the patient does as follow-up in 1 month.  Patient indicated today the cuboid area on his right foot is not really his primary area of discomfort the greatest area that he is having discomfort is along the course of the Phan's nerve and tarsal tunnel left he states he feels as if this was not causing him any discomfort he could handle the pain on the outside of the right foot patient had question me possibly doing a nerve  ablation on the left foot also I have advised him we can only do 1 foot at a time and certainly the right foot is more problematic I have made him aware that I would not recommend the nerve ablation on the left foot but a plantar fascial release with respect shin resection of the heel spur would likely eliminate the pain he is having in the left foot patient was in understanding and agreement we have discussed at length and I provided surgical consultation with the patient regarding a nerve ablation in the tarsal tunnel area right.  I did add some additional arch support to the patient's left over-the-counter support to see if this would help to address the plantar fascial pain that he is having on the left we did discuss at length and in detail the nerve ablation procedure in the area of the tarsal tunnel right patient states he wants to have this done however he has got something coming up in June that he would not likely be healed for I have recommended waiting until after that time he is going to think about this and consider 1 is the best time to do this.  Patient was due for a drug screen today this was evaluated is satisfactory patient's BMP was evaluated prior to prescribing his pain medication as he is under my care for pain management regarding severe bilateral foot pain right greater than left.

## 2019-06-03 ENCOUNTER — OFFICE VISIT (OUTPATIENT)
Dept: PODIATRY | Facility: CLINIC | Age: 44
End: 2019-06-03
Payer: OTHER GOVERNMENT

## 2019-06-03 VITALS
RESPIRATION RATE: 18 BRPM | HEART RATE: 67 BPM | WEIGHT: 195 LBS | DIASTOLIC BLOOD PRESSURE: 74 MMHG | OXYGEN SATURATION: 99 % | HEIGHT: 72 IN | SYSTOLIC BLOOD PRESSURE: 116 MMHG | BODY MASS INDEX: 26.41 KG/M2 | TEMPERATURE: 98 F

## 2019-06-03 DIAGNOSIS — G57.51 TARSAL TUNNEL SYNDROME OF RIGHT SIDE: ICD-10-CM

## 2019-06-03 DIAGNOSIS — M79.2 NEURITIS: Primary | ICD-10-CM

## 2019-06-03 DIAGNOSIS — M19.071 PRIMARY OSTEOARTHRITIS OF RIGHT FOOT: ICD-10-CM

## 2019-06-03 DIAGNOSIS — G35 MS (MULTIPLE SCLEROSIS): ICD-10-CM

## 2019-06-03 DIAGNOSIS — M72.2 PLANTAR FASCIITIS: ICD-10-CM

## 2019-06-03 PROCEDURE — 99213 OFFICE O/P EST LOW 20 MIN: CPT | Mod: S$PBB,,, | Performed by: PODIATRIST

## 2019-06-03 PROCEDURE — 99999 PR PBB SHADOW E&M-EST. PATIENT-LVL III: ICD-10-PCS | Mod: PBBFAC,,, | Performed by: PODIATRIST

## 2019-06-03 PROCEDURE — 99213 PR OFFICE/OUTPT VISIT, EST, LEVL III, 20-29 MIN: ICD-10-PCS | Mod: S$PBB,,, | Performed by: PODIATRIST

## 2019-06-03 PROCEDURE — 99213 OFFICE O/P EST LOW 20 MIN: CPT | Mod: PBBFAC | Performed by: PODIATRIST

## 2019-06-03 PROCEDURE — 99999 PR PBB SHADOW E&M-EST. PATIENT-LVL III: CPT | Mod: PBBFAC,,, | Performed by: PODIATRIST

## 2019-06-03 RX ORDER — OXYCODONE AND ACETAMINOPHEN 10; 325 MG/1; MG/1
1 TABLET ORAL 4 TIMES DAILY
Qty: 120 TABLET | Refills: 0 | Status: SHIPPED | OUTPATIENT
Start: 2019-06-03 | End: 2019-07-03 | Stop reason: SDUPTHER

## 2019-06-03 RX ORDER — OXYCODONE AND ACETAMINOPHEN 10; 325 MG/1; MG/1
1 TABLET ORAL EVERY 4 HOURS PRN
COMMUNITY
End: 2019-06-03 | Stop reason: SDUPTHER

## 2019-06-03 NOTE — LETTER
June 6, 2019      [facility]  [address]  [place] [address]           [facility]  [address] [facility] [place] [address]  Phone: [phone]  Fax: [phone]          Patient: Sg Sesay   MR Number: [id number]   YOB: 1975   Date of Visit: 6/3/2019       Dear [facility]:    Thank you for referring Sg Sesay to me for evaluation. Attached you will find relevant portions of my assessment and plan of care.    If you have questions, please do not hesitate to call me. I look forward to following Sg Sesay along with you.    Sincerely,    Jah Torres, LISANDRO    Enclosure  CC:  No Recipients    If you would like to receive this communication electronically, please contact [email or web address] or [phone] to request more information on [employer] Link access.    For providers and/or their staff who would like to refer a patient to [facility], please contact us through our one-stop-shop provider referral line, [facility], at [phone].    If you feel you have received this communication in error or would no longer like to receive these types of communications, please e-mail [email or web address]

## 2019-06-06 NOTE — PROGRESS NOTES
Subjective:       Patient ID: Sg Sesay is a 43 y.o. male.    Chief Complaint: Follow-up; Foot Pain; and Foot Problem  Patient presents for followed up of continued right foot pain he states he has been actually having more pain in his left foot because of compensating.  Patient relates arch pain left continued pain right.  Patient indicates he is very frustrated he was previously ordered physical therapy for his right foot [facility] however [facility] have [facility] him for physical therapy at the same physical therapy facility for his back.     Foot Pain   Associated symptoms include joint swelling.   Ankle Pain         Review of Systems   Musculoskeletal: Positive for gait problem and joint swelling.   All other systems reviewed and are negative.      Objective:      Physical Exam   Constitutional: He appears well-developed and well-nourished.   Cardiovascular:   Pulses:       Dorsalis pedis pulses are 2+ on the right side, and 2+ on the left side.        Posterior tibial pulses are 2+ on the right side, and 2+ on the left side.   Pulmonary/Chest: Effort normal.   Musculoskeletal: He exhibits edema, tenderness and deformity.        Right foot: There is decreased range of motion and deformity.   Feet:   Right Foot:   Protective Sensation: 4 sites tested. 4 sites sensed.   Left Foot:   Protective Sensation: 4 sites tested. 4 sites sensed.   Neurological: He is alert.   Skin: Skin is warm. Capillary refill takes less than 2 seconds.   Psychiatric: He has a normal mood and affect. His behavior is normal. Judgment and thought content normal.   Nursing note and vitals reviewed.    On evaluation all the patient's incisions are healed patient has less inflammation than previously noted there are no skin breaks no active signs of infection however patient does have a limited range of motion there still some degree of pain primarily in the medial portion of the right foot however overall the area has  improved some nerve related discomfort is appreciated.    Patient is complaining about Achilles tendon pain and tightness at the back of his right heel there is limited dorsiflexion noted due to excessive tightness in this area.  Patient has findings consistent with plantar fascial pain left.  Patient also has significant discomfort with associated inflammation in the region of the cuboid and base of the 5th metatarsal right.  Concern for cuboid syndrome noted.  Assessment:       1. Neuritis    2. Tarsal tunnel syndrome of right side    3. Plantar fasciitis - Left Foot    4. Primary osteoarthritis of right foot    5. MS (multiple sclerosis)        Plan:          Patient presents today for follow-up of bilateral foot pain he relates the plantar fasciitis in the left foot is getting progressively worse he still having pain at the plantar fascial area of the right but now he is having even more pain at the base of the 5th metatarsal and the cuboid bone this raises concern for cuboid syndrome.    Patient indicated today the cuboid area on his right foot is not really his primary area of discomfort the greatest area that he is having discomfort is along the course of the Phan's nerve and tarsal tunnel left he states he feels as if this was not causing him any discomfort he could handle the pain on the outside of the right foot patient had question me possibly doing a nerve ablation on the left foot also I have advised him we can only do 1 foot at a time and certainly the right foot is more problematic I have made him aware that I would recommend the nerve ablation on the left foot but a plantar fascial release with respect shin resection of the heel spur would likely eliminate the pain he is having in the left foot patient was in understanding and agreement we have discussed at length and I provided surgical consultation with the patient regarding a nerve ablation in the tarsal tunnel area right.  I did add some  additional arch support to the patient's left over-the-counter support to see if this would help to address the plantar fascial pain that he is having on the left we did discuss at length and in detail the nerve ablation procedure in the area of the tarsal tunnel right patient states he wants to have this done however he has got something coming up in June that he would not likely be healed for I have recommended waiting until after that time he is going to think about this and consider when is the best time to do this.   Patient's  was evaluated prior to prescribing his pain medication as he is under my care for pain management regarding severe bilateral foot pain right greater than left.  Patient states he is thinking and considering having the nerve ablation as discussed done next month he recently had an MRI done on his head he is currently waiting to have the MRI done on his neck and back because he is having significant problems with these areas he states the ablation procedure in his back as well as steroid injections have not given him any relief.  Patient continues to have findings consistent with cuboid syndrome right I did discuss fusion of the calcaneal cuboid joint. Follow-up will be 1 month unless the patient has any problems questions or concerns prior to this.  Patient did attempt to have his neck and back MRI did however after having the head MRI done he states he experience significant anxiety in the MRI machine and had to leave.  Follow-up 1 month.  Patient will likely need further surgical intervention right foot.

## 2019-07-03 ENCOUNTER — OFFICE VISIT (OUTPATIENT)
Dept: PODIATRY | Facility: CLINIC | Age: 44
End: 2019-07-03
Payer: COMMERCIAL

## 2019-07-03 VITALS
DIASTOLIC BLOOD PRESSURE: 96 MMHG | HEIGHT: 72 IN | HEART RATE: 67 BPM | SYSTOLIC BLOOD PRESSURE: 137 MMHG | TEMPERATURE: 98 F | WEIGHT: 195 LBS | BODY MASS INDEX: 26.41 KG/M2

## 2019-07-03 DIAGNOSIS — G89.29 CHRONIC PAIN OF RIGHT ANKLE: ICD-10-CM

## 2019-07-03 DIAGNOSIS — M25.571 CHRONIC PAIN OF RIGHT ANKLE: ICD-10-CM

## 2019-07-03 DIAGNOSIS — M79.2 NEURITIS: ICD-10-CM

## 2019-07-03 DIAGNOSIS — M19.071 PRIMARY OSTEOARTHRITIS OF RIGHT FOOT: ICD-10-CM

## 2019-07-03 DIAGNOSIS — G89.29 CHRONIC PAIN IN RIGHT FOOT: ICD-10-CM

## 2019-07-03 DIAGNOSIS — G35 MS (MULTIPLE SCLEROSIS): ICD-10-CM

## 2019-07-03 DIAGNOSIS — G57.51 TARSAL TUNNEL SYNDROME OF RIGHT SIDE: ICD-10-CM

## 2019-07-03 DIAGNOSIS — M72.2 PLANTAR FASCIITIS: Primary | ICD-10-CM

## 2019-07-03 DIAGNOSIS — M79.671 CHRONIC PAIN IN RIGHT FOOT: ICD-10-CM

## 2019-07-03 PROCEDURE — 99999 PR PBB SHADOW E&M-EST. PATIENT-LVL III: CPT | Mod: PBBFAC,,, | Performed by: PODIATRIST

## 2019-07-03 PROCEDURE — 99213 OFFICE O/P EST LOW 20 MIN: CPT | Mod: PBBFAC | Performed by: PODIATRIST

## 2019-07-03 PROCEDURE — 99214 PR OFFICE/OUTPT VISIT, EST, LEVL IV, 30-39 MIN: ICD-10-PCS | Mod: S$PBB,,, | Performed by: PODIATRIST

## 2019-07-03 PROCEDURE — 99214 OFFICE O/P EST MOD 30 MIN: CPT | Mod: S$PBB,,, | Performed by: PODIATRIST

## 2019-07-03 PROCEDURE — 99999 PR PBB SHADOW E&M-EST. PATIENT-LVL III: ICD-10-PCS | Mod: PBBFAC,,, | Performed by: PODIATRIST

## 2019-07-03 RX ORDER — DICLOFENAC SODIUM 75 MG/1
75 TABLET, DELAYED RELEASE ORAL 2 TIMES DAILY
Qty: 60 TABLET | Refills: 4 | Status: SHIPPED | OUTPATIENT
Start: 2019-07-03 | End: 2020-03-11

## 2019-07-03 RX ORDER — DICLOFENAC SODIUM 10 MG/G
2 GEL TOPICAL 4 TIMES DAILY
Qty: 3 TUBE | Refills: 3 | Status: SHIPPED | OUTPATIENT
Start: 2019-07-03 | End: 2019-08-02

## 2019-07-03 RX ORDER — OXYCODONE AND ACETAMINOPHEN 10; 325 MG/1; MG/1
1 TABLET ORAL 4 TIMES DAILY
Qty: 120 TABLET | Refills: 0 | Status: SHIPPED | OUTPATIENT
Start: 2019-07-03 | End: 2019-08-02

## 2019-07-07 NOTE — PROGRESS NOTES
Subjective:       Patient ID: Sg Sesay is a 43 y.o. male.    Chief Complaint: Follow-up; Foot Pain; and Foot Problem  Patient presents for followed up of continued right foot pain he states he has been actually having more pain in his left foot because of compensating.  Patient relates arch pain left continued pain right.  Patient indicates he is very frustrated he was previously ordered physical therapy for his right foot [facility] however they have [facility] him for physical therapy at the same physical therapy facility for his back.     Foot Pain   Associated symptoms include joint swelling.   Ankle Pain         Review of Systems   Musculoskeletal: Positive for gait problem and joint swelling.   All other systems reviewed and are negative.      Objective:      Physical Exam   Constitutional: He appears well-developed and well-nourished.   Cardiovascular:   Pulses:       Dorsalis pedis pulses are 2+ on the right side, and 2+ on the left side.        Posterior tibial pulses are 2+ on the right side, and 2+ on the left side.   Pulmonary/Chest: Effort normal.   Musculoskeletal: He exhibits edema, tenderness and deformity.        Right foot: There is decreased range of motion and deformity.   Feet:   Right Foot:   Protective Sensation: 4 sites tested. 4 sites sensed.   Left Foot:   Protective Sensation: 4 sites tested. 4 sites sensed.   Neurological: He is alert.   Skin: Skin is warm. Capillary refill takes less than 2 seconds.   Psychiatric: He has a normal mood and affect. His behavior is normal. Judgment and thought content normal.   Nursing note and vitals reviewed.    On evaluation all the patient's incisions are healed patient has less inflammation than previously noted there are no skin breaks no active signs of infection however patient does have a limited range of motion there still some degree of pain primarily in the medial portion of the right foot however overall the area has  improved some nerve related discomfort is appreciated.    Patient is complaining about Achilles tendon pain and tightness at the back of his right heel there is limited dorsiflexion noted due to excessive tightness in this area.  Patient has findings consistent with plantar fascial pain left.  Patient also has significant discomfort with associated inflammation in the region of the cuboid and base of the 5th metatarsal right.  Concern for cuboid syndrome noted.  Assessment:       1. Plantar fasciitis - Left Foot    2. Primary osteoarthritis of right foot    3. Chronic pain of right ankle    4. Chronic pain in right foot    5. Tarsal tunnel syndrome of right side    6. Neuritis    7. MS (multiple sclerosis)        Plan:          Patient presents today for follow-up of bilateral foot pain he relates the plantar fasciitis in the left foot is getting progressively worse he still having pain at the plantar fascial area of the right but now he is having even more pain at the base of the 5th metatarsal and the cuboid bone this raises concern for cuboid syndrome.    Patient indicated today the cuboid area on his right foot is not really his primary area of discomfort the greatest area that he is having discomfort is along the course of the Phan's nerve and tarsal tunnel left he states he feels as if this was not causing him any discomfort he could handle the pain on the outside of the right foot patient had question me possibly doing a nerve ablation on the left foot also I have advised him we can only do 1 foot at a time and certainly the right foot is more problematic I have made him aware that I would recommend the nerve ablation on the left foot but a plantar fascial release with respect shin resection of the heel spur would likely eliminate the pain he is having in the left foot patient was in understanding and agreement we have discussed at length and I provided surgical consultation with the patient regarding a  nerve ablation in the tarsal tunnel area right.  I did add some additional arch support to the patient's left over-the-counter support to see if this would help to address the plantar fascial pain that he is having on the left we did discuss at length and in detail the nerve ablation procedure in the area of the tarsal tunnel right patient states he wants to have this done however he has got something coming up in June that he would not likely be healed for I have recommended waiting until after that time he is going to think about this and consider when is the best time to do this.   Patient's  was evaluated prior to prescribing his pain medication as he is under my care for pain management regarding severe bilateral foot pain right greater than left.  Patient states he is thinking and considering having the nerve ablation as discussed done next month he recently had an MRI done on his head he is currently waiting to have the MRI done on his neck and back because he is having significant problems with these areas he states the ablation procedure in his back as well as steroid injections have not given him any relief.  Patient continues to have findings consistent with cuboid syndrome right I did discuss fusion of the calcaneal cuboid joint. Follow-up will be 1 month unless the patient has any problems questions or concerns prior to this. Follow-up 1 month.  Patient will likely need further surgical intervention right foot. A new pain management contract was executed today.  Patient put on diclofenac both oral and topical gel patient has recently started to develop cuboid syndrome in the left due to compensation.  Patient understands he is going to likely need surgery for the cuboid syndrome with a calcaneal cuboid fusion right and nerve ablation right.

## 2019-08-05 ENCOUNTER — LAB VISIT (OUTPATIENT)
Dept: LAB | Facility: HOSPITAL | Age: 44
End: 2019-08-05
Attending: PODIATRIST
Payer: COMMERCIAL

## 2019-08-05 ENCOUNTER — OFFICE VISIT (OUTPATIENT)
Dept: PODIATRY | Facility: CLINIC | Age: 44
End: 2019-08-05
Payer: COMMERCIAL

## 2019-08-05 ENCOUNTER — TELEPHONE (OUTPATIENT)
Dept: PODIATRY | Facility: CLINIC | Age: 44
End: 2019-08-05

## 2019-08-05 VITALS
BODY MASS INDEX: 26.41 KG/M2 | DIASTOLIC BLOOD PRESSURE: 85 MMHG | HEART RATE: 79 BPM | SYSTOLIC BLOOD PRESSURE: 127 MMHG | TEMPERATURE: 98 F | RESPIRATION RATE: 18 BRPM | WEIGHT: 195 LBS | OXYGEN SATURATION: 98 % | HEIGHT: 72 IN

## 2019-08-05 DIAGNOSIS — M25.571 CHRONIC PAIN OF RIGHT ANKLE: ICD-10-CM

## 2019-08-05 DIAGNOSIS — G89.29 CHRONIC PAIN OF RIGHT ANKLE: ICD-10-CM

## 2019-08-05 DIAGNOSIS — M79.2 NEURITIS: ICD-10-CM

## 2019-08-05 DIAGNOSIS — M79.671 CHRONIC PAIN IN RIGHT FOOT: ICD-10-CM

## 2019-08-05 DIAGNOSIS — M72.2 PLANTAR FASCIITIS: ICD-10-CM

## 2019-08-05 DIAGNOSIS — G89.29 CHRONIC PAIN IN RIGHT FOOT: Primary | ICD-10-CM

## 2019-08-05 DIAGNOSIS — G89.29 CHRONIC PAIN IN RIGHT FOOT: ICD-10-CM

## 2019-08-05 DIAGNOSIS — M79.671 CHRONIC PAIN IN RIGHT FOOT: Primary | ICD-10-CM

## 2019-08-05 DIAGNOSIS — G35 MS (MULTIPLE SCLEROSIS): ICD-10-CM

## 2019-08-05 DIAGNOSIS — G57.51 TARSAL TUNNEL SYNDROME OF RIGHT SIDE: ICD-10-CM

## 2019-08-05 DIAGNOSIS — M19.071 PRIMARY OSTEOARTHRITIS OF RIGHT FOOT: ICD-10-CM

## 2019-08-05 LAB
AMPHET+METHAMPHET UR QL: NEGATIVE
BENZODIAZ UR QL SCN>200 NG/ML: NEGATIVE
BZE UR QL SCN: NEGATIVE
CANNABINOIDS UR QL SCN: NEGATIVE
CREAT UR-MCNC: 364.8 MG/DL (ref 23–375)
OPIATES UR QL SCN: NORMAL
PCP UR QL SCN>25 NG/ML: NEGATIVE
TOXICOLOGY INFORMATION: NORMAL

## 2019-08-05 PROCEDURE — 99213 PR OFFICE/OUTPT VISIT, EST, LEVL III, 20-29 MIN: ICD-10-PCS | Mod: S$PBB,,, | Performed by: PODIATRIST

## 2019-08-05 PROCEDURE — 80307 DRUG TEST PRSMV CHEM ANLYZR: CPT

## 2019-08-05 PROCEDURE — 99213 OFFICE O/P EST LOW 20 MIN: CPT | Mod: PBBFAC | Performed by: PODIATRIST

## 2019-08-05 PROCEDURE — 99999 PR PBB SHADOW E&M-EST. PATIENT-LVL III: CPT | Mod: PBBFAC,,, | Performed by: PODIATRIST

## 2019-08-05 PROCEDURE — 99999 PR PBB SHADOW E&M-EST. PATIENT-LVL III: ICD-10-PCS | Mod: PBBFAC,,, | Performed by: PODIATRIST

## 2019-08-05 PROCEDURE — 99213 OFFICE O/P EST LOW 20 MIN: CPT | Mod: S$PBB,,, | Performed by: PODIATRIST

## 2019-08-05 RX ORDER — OXYCODONE AND ACETAMINOPHEN 10; 325 MG/1; MG/1
1 TABLET ORAL 4 TIMES DAILY
Qty: 120 TABLET | Refills: 0 | Status: SHIPPED | OUTPATIENT
Start: 2019-08-05 | End: 2019-09-04

## 2019-08-05 NOTE — LETTER
August 10, 2019      [facility]  [address]  [place] [address]           [facility]ound [facility]  [address] [facility] [place] [address]  Phone: [phone]  Fax: [phone]          Patient: Sg Sesay   MR Number: [id number]   YOB: 1975   Date of Visit: 8/5/2019       Dear [facility]:    Thank you for referring Sg Sesay to me for evaluation. Attached you will find relevant portions of my assessment and plan of care.    If you have questions, please do not hesitate to call me. I look forward to following Sg Sesay along with you.    Sincerely,    Jah Torres, LISANDRO    Enclosure  CC:  No Recipients    If you would like to receive this communication electronically, please contact [email or web address] or [phone] to request more information on [employer] Link access.    For providers and/or their staff who would like to refer a patient to [facility], please contact us through our one-stop-shop provider referral line, [facility], at [phone].    If you feel you have received this communication in error or would no longer like to receive these types of communications, please e-mail [email or web address]

## 2019-08-05 NOTE — TELEPHONE ENCOUNTER
----- Message from Jah Torres DPM sent at 8/5/2019 12:22 PM CDT -----  Please call the patient regarding his abnormal result. Drug screen fine rx sent.

## 2019-08-10 NOTE — PROGRESS NOTES
Subjective:       Patient ID: Sg Sesay is a 43 y.o. male.    Chief Complaint: Follow-up  Patient presents for followed up of continued right foot pain he states he has been actually having more pain in his left foot because of compensating.  Patient relates arch pain left continued pain right.  Patient indicates he is very frustrated he was previously ordered physical therapy for his right foot [facility] however they have [facility] him for physical therapy at the same physical therapy facility for his back.     Foot Pain   Associated symptoms include joint swelling.   Ankle Pain         Review of Systems   Musculoskeletal: Positive for gait problem and joint swelling.   All other systems reviewed and are negative.      Objective:      Physical Exam   Constitutional: He appears well-developed and well-nourished.   Cardiovascular:   Pulses:       Dorsalis pedis pulses are 2+ on the right side, and 2+ on the left side.        Posterior tibial pulses are 2+ on the right side, and 2+ on the left side.   Pulmonary/Chest: Effort normal.   Musculoskeletal: He exhibits edema, tenderness and deformity.        Right foot: There is decreased range of motion and deformity.   Feet:   Right Foot:   Protective Sensation: 4 sites tested. 4 sites sensed.   Left Foot:   Protective Sensation: 4 sites tested. 4 sites sensed.   Neurological: He is alert.   Skin: Skin is warm. Capillary refill takes less than 2 seconds.   Psychiatric: He has a normal mood and affect. His behavior is normal. Judgment and thought content normal.   Nursing note and vitals reviewed.    On evaluation all the patient's incisions are healed patient has less inflammation than previously noted there are no skin breaks no active signs of infection however patient does have a limited range of motion there still some degree of pain primarily in the medial portion of the right foot however overall the area has improved some nerve related  discomfort is appreciated.    Patient is complaining about Achilles tendon pain and tightness at the back of his right heel there is limited dorsiflexion noted due to excessive tightness in this area.  Patient has findings consistent with plantar fascial pain left.  Patient also has significant discomfort with associated inflammation in the region of the cuboid and base of the 5th metatarsal right.  Concern for cuboid syndrome noted.  Assessment:       1. Chronic pain in right foot    2. Chronic pain of right ankle    3. Primary osteoarthritis of right foot    4. Plantar fasciitis - Left Foot    5. Neuritis    6. MS (multiple sclerosis)    7. Tarsal tunnel syndrome of right side        Plan:          Patient presents today for follow-up of bilateral foot pain he relates the plantar fasciitis in the left foot is getting progressively worse he still having pain at the plantar fascial area of the right but now he is having even more pain at the base of the 5th metatarsal and the cuboid bone this raises concern for cuboid syndrome.    Patient indicated today the cuboid area on his right foot is not really his primary area of discomfort the greatest area that he is having discomfort is along the course of the Phan's nerve and tarsal tunnel left he states he feels as if this was not causing him any discomfort he could handle the pain on the outside of the right foot patient had question me possibly doing a nerve ablation on the left foot also I have advised him we can only do 1 foot at a time and certainly the right foot is more problematic I have made him aware that I would recommend the nerve ablation on the left foot but a plantar fascial release with respect shin resection of the heel spur would likely eliminate the pain he is having in the left foot patient was in understanding and agreement we have discussed at length and I provided surgical consultation with the patient regarding a nerve ablation in the tarsal  tunnel area right.  I did add some additional arch support to the patient's left over-the-counter support to see if this would help to address the plantar fascial pain that he is having on the left we did discuss at length and in detail the nerve ablation procedure in the area of the tarsal tunnel right patient states he wants to have this done however he has got something coming up in June that he would not likely be healed for I have recommended waiting until after that time he is going to think about this and consider when is the best time to do this.   Patient's  was evaluated prior to prescribing his pain medication as he is under my care for pain management regarding severe bilateral foot pain right greater than left.  Patient states he is thinking and considering having the nerve ablation as discussed done next month he recently had an MRI done on his head he is currently waiting to have the MRI done on his neck and back because he is having significant problems with these areas he states the ablation procedure in his back as well as steroid injections have not given him any relief.  Patient continues to have findings consistent with cuboid syndrome right I did discuss fusion of the calcaneal cuboid joint. Follow-up will be 1 month unless the patient has any problems questions or concerns prior to this. Follow-up 1 month.  Patient will likely need further surgical intervention right foot.     Patient understands he is going to likely need surgery for the cuboid syndrome with a calcaneal cuboid fusion right and nerve ablation right.  Patient states right now he knows he needs surgery on his right foot but he is going to put this on hold it got has a lot of things going on in his life I told him I certainly understand this.  Patient indicated basically without the pain medication that I give him for pain management of both lower extremities he would not be able to walk at all.

## 2019-09-09 ENCOUNTER — OFFICE VISIT (OUTPATIENT)
Dept: PODIATRY | Facility: CLINIC | Age: 44
End: 2019-09-09
Payer: COMMERCIAL

## 2019-09-09 VITALS
HEIGHT: 72 IN | TEMPERATURE: 98 F | DIASTOLIC BLOOD PRESSURE: 78 MMHG | WEIGHT: 195 LBS | BODY MASS INDEX: 26.41 KG/M2 | SYSTOLIC BLOOD PRESSURE: 120 MMHG | HEART RATE: 76 BPM

## 2019-09-09 DIAGNOSIS — G89.29 CHRONIC PAIN IN RIGHT FOOT: ICD-10-CM

## 2019-09-09 DIAGNOSIS — M79.671 CHRONIC PAIN IN RIGHT FOOT: ICD-10-CM

## 2019-09-09 DIAGNOSIS — M19.071 PRIMARY OSTEOARTHRITIS OF RIGHT FOOT: ICD-10-CM

## 2019-09-09 DIAGNOSIS — M79.2 NEURITIS: Primary | ICD-10-CM

## 2019-09-09 DIAGNOSIS — G57.51 TARSAL TUNNEL SYNDROME OF RIGHT SIDE: ICD-10-CM

## 2019-09-09 DIAGNOSIS — M72.2 PLANTAR FASCIITIS: ICD-10-CM

## 2019-09-09 PROCEDURE — 99213 OFFICE O/P EST LOW 20 MIN: CPT | Mod: S$PBB,,, | Performed by: PODIATRIST

## 2019-09-09 PROCEDURE — 99999 PR PBB SHADOW E&M-EST. PATIENT-LVL III: ICD-10-PCS | Mod: PBBFAC,,, | Performed by: PODIATRIST

## 2019-09-09 PROCEDURE — 99213 PR OFFICE/OUTPT VISIT, EST, LEVL III, 20-29 MIN: ICD-10-PCS | Mod: S$PBB,,, | Performed by: PODIATRIST

## 2019-09-09 PROCEDURE — 99999 PR PBB SHADOW E&M-EST. PATIENT-LVL III: CPT | Mod: PBBFAC,,, | Performed by: PODIATRIST

## 2019-09-09 PROCEDURE — 99213 OFFICE O/P EST LOW 20 MIN: CPT | Mod: PBBFAC | Performed by: PODIATRIST

## 2019-09-09 RX ORDER — ALPRAZOLAM 0.5 MG/1
TABLET ORAL
Refills: 0 | COMMUNITY
Start: 2019-08-27 | End: 2020-08-03

## 2019-09-09 RX ORDER — OXYCODONE AND ACETAMINOPHEN 10; 325 MG/1; MG/1
1 TABLET ORAL 4 TIMES DAILY
Qty: 120 TABLET | Refills: 0 | Status: SHIPPED | OUTPATIENT
Start: 2019-09-09 | End: 2019-10-09 | Stop reason: SDUPTHER

## 2019-09-09 NOTE — LETTER
September 13, 2019      [facility]  [address]  [place] [address]           [facility]ound [facility]  [address] [facility] [place] [address]  Phone: [phone]  Fax: [phone]          Patient: Sg Sesay   MR Number: [id number]   YOB: 1975   Date of Visit: 9/9/2019       Dear [facility]:    Thank you for referring Sg Sesay to me for evaluation. Attached you will find relevant portions of my assessment and plan of care.    If you have questions, please do not hesitate to call me. I look forward to following Sg Sesay along with you.    Sincerely,    Jah Torres, LISANDRO    Enclosure  CC:  No Recipients    If you would like to receive this communication electronically, please contact [email or web address] or [phone] to request more information on [employer] Link access.    For providers and/or their staff who would like to refer a patient to [facility], please contact us through our one-stop-shop provider referral line, [facility], at [phone].    If you feel you have received this communication in error or would no longer like to receive these types of communications, please e-mail [email or web address]

## 2019-09-14 NOTE — PROGRESS NOTES
Subjective:       Patient ID: Sg Sesay is a 43 y.o. male.    Chief Complaint: Follow-up and Foot Pain  Patient presents for followed up of continued right foot pain he states he has been actually having more pain in his left foot because of compensating.  Patient relates arch pain left continued pain right.  Patient indicates he is very frustrated he was previously ordered physical therapy for his right foot [facility] however [facility] have [facility] him for physical therapy at the same physical therapy facility for his back.     Foot Pain   Associated symptoms include joint swelling.   Ankle Pain      Follow-up   Associated symptoms include joint swelling.      Review of Systems   Musculoskeletal: Positive for gait problem and joint swelling.   All other systems reviewed and are negative.      Objective:      Physical Exam   Constitutional: He appears well-developed and well-nourished.   Cardiovascular:   Pulses:       Dorsalis pedis pulses are 2+ on the right side, and 2+ on the left side.        Posterior tibial pulses are 2+ on the right side, and 2+ on the left side.   Pulmonary/Chest: Effort normal.   Musculoskeletal: He exhibits edema, tenderness and deformity.        Right foot: There is decreased range of motion and deformity.   Feet:   Right Foot:   Protective Sensation: 4 sites tested. 4 sites sensed.   Left Foot:   Protective Sensation: 4 sites tested. 4 sites sensed.   Neurological: He is alert.   Skin: Skin is warm. Capillary refill takes less than 2 seconds.   Psychiatric: He has a normal mood and affect. His behavior is normal. Judgment and thought content normal.   Nursing note and vitals reviewed.    On evaluation all the patient's incisions are healed patient has less inflammation than previously noted there are no skin breaks no active signs of infection however patient does have a limited range of motion there still some degree of pain primarily in the medial portion of  the right foot however overall the area has improved some nerve related discomfort is appreciated.    Patient is complaining about Achilles tendon pain and tightness at the back of his right heel there is limited dorsiflexion noted due to excessive tightness in this area.  Patient has findings consistent with plantar fascial pain left.  Patient also has significant discomfort with associated inflammation in the region of the cuboid and base of the 5th metatarsal right.  Concern for cuboid syndrome noted.  Assessment:       1. Neuritis    2. Tarsal tunnel syndrome of right side    3. Chronic pain in right foot    4. Plantar fasciitis - Left Foot    5. Primary osteoarthritis of right foot        Plan:          Patient presents today for follow-up of bilateral foot pain he relates the plantar fasciitis in the left foot is getting progressively worse he still having pain at the plantar fascial area of the right but now he is having even more pain at the base of the 5th metatarsal and the cuboid bone this raises concern for cuboid syndrome.    Patient indicated today the cuboid area on his right foot is not really his primary area of discomfort the greatest area that he is having discomfort is along the course of the Phan's nerve and tarsal tunnel left he states he feels as if this was not causing him any discomfort he could handle the pain on the outside of the right foot patient had question me possibly doing a nerve ablation on the left foot also I have advised him we can only do 1 foot at a time and certainly the right foot is more problematic I have made him aware that I would recommend the nerve ablation on the left foot but a plantar fascial release with respect shin resection of the heel spur would likely eliminate the pain he is having in the left foot patient was in understanding and agreement we have discussed at length and I provided surgical consultation with the patient regarding a nerve ablation in the  tarsal tunnel area right.  I did add some additional arch support to the patient's left over-the-counter support to see if this would help to address the plantar fascial pain that he is having on the left we did discuss at length and in detail the nerve ablation procedure in the area of the tarsal tunnel right patient states he wants to have this done however he has got something coming up in June that he would not likely be healed for I have recommended waiting until after that time he is going to think about this and consider when is the best time to do this.   Patient's  was evaluated prior to prescribing his pain medication as he is under my care for pain management regarding severe bilateral foot pain right greater than left.  Patient continues to have findings consistent with cuboid syndrome right I did discuss fusion of the calcaneal cuboid joint. Follow-up will be 1 month unless the patient has any problems questions or concerns prior to this. Follow-up 1 month.  Patient will likely need further surgical intervention right foot.     Patient understands he is going to likely need surgery for the cuboid syndrome with a calcaneal cuboid fusion right and nerve ablation right.  Patient states right now he knows he needs surgery on his right foot but he is going to put this on hold it got has a lot of things going on in his life I told him I certainly understand this.  Patient indicated basically without the pain medication that I give him for pain management of both lower extremities he would not be able to walk at all.  Patient states he is scheduled to have an MRI with contrast done to evaluate his back he states that he has spinal compression so and he is now having pain all over his body making it difficult for him to even sleep or get out of bed in the morning.  Patient obviously has other issues with his neck and back that he needs to try to get to the bottom of before we can pursue any further surgery  on the right foot. Patient has an up-to-date pain management contract on file.This note was created using [employer] voice recognition software that occasionally misinterpreted phrases or words.

## 2019-09-16 RX ORDER — PREGABALIN 150 MG/1
CAPSULE ORAL
Qty: 90 CAPSULE | Refills: 0 | Status: SHIPPED | OUTPATIENT
Start: 2019-09-16 | End: 2019-10-15 | Stop reason: SDUPTHER

## 2019-10-09 ENCOUNTER — OFFICE VISIT (OUTPATIENT)
Dept: PODIATRY | Facility: CLINIC | Age: 44
End: 2019-10-09
Payer: COMMERCIAL

## 2019-10-09 VITALS
WEIGHT: 195 LBS | RESPIRATION RATE: 19 BRPM | OXYGEN SATURATION: 98 % | HEART RATE: 73 BPM | TEMPERATURE: 98 F | HEIGHT: 72 IN | BODY MASS INDEX: 26.41 KG/M2 | SYSTOLIC BLOOD PRESSURE: 130 MMHG | DIASTOLIC BLOOD PRESSURE: 78 MMHG

## 2019-10-09 DIAGNOSIS — M19.071 PRIMARY OSTEOARTHRITIS OF RIGHT FOOT: Primary | ICD-10-CM

## 2019-10-09 DIAGNOSIS — G89.29 CHRONIC PAIN OF RIGHT ANKLE: ICD-10-CM

## 2019-10-09 DIAGNOSIS — M79.2 NEURITIS: ICD-10-CM

## 2019-10-09 DIAGNOSIS — M25.571 CHRONIC PAIN OF RIGHT ANKLE: ICD-10-CM

## 2019-10-09 DIAGNOSIS — G57.51 TARSAL TUNNEL SYNDROME OF RIGHT SIDE: ICD-10-CM

## 2019-10-09 DIAGNOSIS — M79.671 CHRONIC PAIN IN RIGHT FOOT: ICD-10-CM

## 2019-10-09 DIAGNOSIS — G89.29 CHRONIC PAIN IN RIGHT FOOT: ICD-10-CM

## 2019-10-09 DIAGNOSIS — G35 MS (MULTIPLE SCLEROSIS): ICD-10-CM

## 2019-10-09 DIAGNOSIS — M72.2 PLANTAR FASCIITIS: ICD-10-CM

## 2019-10-09 PROCEDURE — 99999 PR PBB SHADOW E&M-EST. PATIENT-LVL III: ICD-10-PCS | Mod: PBBFAC,,, | Performed by: PODIATRIST

## 2019-10-09 PROCEDURE — 99213 OFFICE O/P EST LOW 20 MIN: CPT | Mod: S$PBB,,, | Performed by: PODIATRIST

## 2019-10-09 PROCEDURE — 99213 PR OFFICE/OUTPT VISIT, EST, LEVL III, 20-29 MIN: ICD-10-PCS | Mod: S$PBB,,, | Performed by: PODIATRIST

## 2019-10-09 PROCEDURE — 99999 PR PBB SHADOW E&M-EST. PATIENT-LVL III: CPT | Mod: PBBFAC,,, | Performed by: PODIATRIST

## 2019-10-09 PROCEDURE — 99213 OFFICE O/P EST LOW 20 MIN: CPT | Mod: PBBFAC | Performed by: PODIATRIST

## 2019-10-09 RX ORDER — OXYCODONE AND ACETAMINOPHEN 10; 325 MG/1; MG/1
1 TABLET ORAL 4 TIMES DAILY
Qty: 120 TABLET | Refills: 0 | Status: SHIPPED | OUTPATIENT
Start: 2019-10-09 | End: 2019-11-08

## 2019-10-09 NOTE — LETTER
October 12, 2019      [facility]  [address]  [place] [address]           [facility]  [address] [facility] [place] [address]  Phone: [phone]  Fax: [phone]          Patient: Sg Sesay   MR Number: [id number]   YOB: 1975   Date of Visit: 10/9/2019       Dear [facility]:    Thank you for referring Sg Sesay to me for evaluation. Attached you will find relevant portions of my assessment and plan of care.    If you have questions, please do not hesitate to call me. I look forward to following Sg Sesay along with you.    Sincerely,    Jah Torres, LISANDRO    Enclosure  CC:  No Recipients    If you would like to receive this communication electronically, please contact [email or web address] or [phone] to request more information on [employer] Link access.    For providers and/or their staff who would like to refer a patient to [facility], please contact us through our one-stop-shop provider referral line, [facility], at [phone].    If you feel you have received this communication in error or would no longer like to receive these types of communications, please e-mail [email or web address]

## 2019-10-12 NOTE — PROGRESS NOTES
Subjective:       Patient ID: Sg Sesay is a 43 y.o. male.    Chief Complaint: Follow-up (1 month)  Patient presents for followed up of continued right foot pain he states he has been actually having more pain in his left foot because of compensating.  Patient relates arch pain left continued pain right.  Patient indicates he is very frustrated he was previously ordered physical therapy for his right foot [facility] however they have [facility] him for physical therapy at the same physical therapy facility for his back.     Follow-up   Associated symptoms include joint swelling.   Foot Pain   Associated symptoms include joint swelling.   Ankle Pain         Review of Systems   Musculoskeletal: Positive for gait problem and joint swelling.   All other systems reviewed and are negative.      Objective:      Physical Exam   Constitutional: He appears well-developed and well-nourished.   Cardiovascular:   Pulses:       Dorsalis pedis pulses are 2+ on the right side, and 2+ on the left side.        Posterior tibial pulses are 2+ on the right side, and 2+ on the left side.   Pulmonary/Chest: Effort normal.   Musculoskeletal: He exhibits edema, tenderness and deformity.        Right foot: There is decreased range of motion and deformity.   Feet:   Right Foot:   Protective Sensation: 4 sites tested. 4 sites sensed.   Left Foot:   Protective Sensation: 4 sites tested. 4 sites sensed.   Neurological: He is alert.   Skin: Skin is warm. Capillary refill takes less than 2 seconds.   Psychiatric: He has a normal mood and affect. His behavior is normal. Judgment and thought content normal.   Nursing note and vitals reviewed.    On evaluation all the patient's incisions are healed patient has less inflammation than previously noted there are no skin breaks no active signs of infection however patient does have a limited range of motion there still some degree of pain primarily in the medial portion of the  right foot however overall the area has improved some nerve related discomfort is appreciated.    Patient is complaining about Achilles tendon pain and tightness at the back of his right heel there is limited dorsiflexion noted due to excessive tightness in this area.  Patient has findings consistent with plantar fascial pain left.  Patient also has significant discomfort with associated inflammation in the region of the cuboid and base of the 5th metatarsal right.  Concern for cuboid syndrome noted.  Assessment:       1. Primary osteoarthritis of right foot    2. Plantar fasciitis - Left Foot    3. Chronic pain of right ankle    4. Chronic pain in right foot    5. Tarsal tunnel syndrome of right side    6. MS (multiple sclerosis)    7. Neuritis        Plan:          Patient presents today for follow-up of bilateral foot pain he relates the plantar fasciitis in the left foot is getting progressively worse he still having pain at the plantar fascial area of the right but now he is having even more pain at the base of the 5th metatarsal and the cuboid bone this raises concern for cuboid syndrome.    Patient indicated today the cuboid area on his right foot is not really his primary area of discomfort the greatest area that he is having discomfort is along the course of the Phan's nerve and tarsal tunnel left he states he feels as if this was not causing him any discomfort he could handle the pain on the outside of the right foot patient had question me possibly doing a nerve ablation on the left foot also I have advised him we can only do 1 foot at a time and certainly the right foot is more problematic I have made him aware that I would recommend the nerve ablation on the left foot but a plantar fascial release with respect shin resection of the heel spur would likely eliminate the pain he is having in the left foot patient was in understanding and agreement we have discussed at length and I provided surgical  consultation with the patient regarding a nerve ablation in the tarsal tunnel area right.  I did add some additional arch support to the patient's left over-the-counter support to see if this would help to address the plantar fascial pain that he is having on the left we did discuss at length and in detail the nerve ablation procedure in the area of the tarsal tunnel right patient states he wants to have this done however he has got something coming up in June that he would not likely be healed for I have recommended waiting until after that time he is going to think about this and consider when is the best time to do this.   Patient's  was evaluated prior to prescribing his pain medication as he is under my care for pain management regarding severe bilateral foot pain right greater than left.  Patient continues to have findings consistent with cuboid syndrome right I did discuss fusion of the calcaneal cuboid joint. Follow-up will be 1 month unless the patient has any problems questions or concerns prior to this. Follow-up 1 month.  Patient will likely need further surgical intervention right foot.     Patient understands he is going to likely need surgery for the cuboid syndrome with a calcaneal cuboid fusion right and nerve ablation right.  Patient states right now he knows he needs surgery on his right foot but he is going to put this on hold it got has a lot of things going on in his life I told him I certainly understand this.  Patient indicated basically without the pain medication and Lyrica that I give him for pain management of both lower extremities he would not be able to walk at all.  Patient states the MRI that he had done did show on MS lesion in his cervical area he also states that he has compression of the neck and the spine.  Patient's left foot is pretty much unchanged he understands he knows he needs surgery on the right foot but he has addressing these other issues in relationship to his MS.   Patient is to follow up with his neurologist later this week.  Patient has an up-to-date pain management contract on file.This note was created using [employer] voice recognition software that occasionally misinterpreted phrases or words.

## 2019-10-15 RX ORDER — PREGABALIN 150 MG/1
CAPSULE ORAL
Qty: 90 CAPSULE | Refills: 6 | Status: SHIPPED | OUTPATIENT
Start: 2019-10-15 | End: 2020-02-10 | Stop reason: SDUPTHER

## 2019-11-11 ENCOUNTER — TELEPHONE (OUTPATIENT)
Dept: PODIATRY | Facility: CLINIC | Age: 44
End: 2019-11-11

## 2019-11-11 ENCOUNTER — LAB VISIT (OUTPATIENT)
Dept: LAB | Facility: HOSPITAL | Age: 44
End: 2019-11-11
Attending: PODIATRIST
Payer: MEDICARE

## 2019-11-11 ENCOUNTER — OFFICE VISIT (OUTPATIENT)
Dept: PODIATRY | Facility: CLINIC | Age: 44
End: 2019-11-11
Payer: COMMERCIAL

## 2019-11-11 VITALS
TEMPERATURE: 98 F | SYSTOLIC BLOOD PRESSURE: 128 MMHG | WEIGHT: 200 LBS | DIASTOLIC BLOOD PRESSURE: 87 MMHG | BODY MASS INDEX: 27.09 KG/M2 | HEART RATE: 84 BPM | HEIGHT: 72 IN

## 2019-11-11 DIAGNOSIS — M19.071 PRIMARY OSTEOARTHRITIS OF RIGHT FOOT: ICD-10-CM

## 2019-11-11 DIAGNOSIS — M79.671 CHRONIC PAIN IN RIGHT FOOT: ICD-10-CM

## 2019-11-11 DIAGNOSIS — M25.571 CHRONIC PAIN OF RIGHT ANKLE: Primary | ICD-10-CM

## 2019-11-11 DIAGNOSIS — G89.29 CHRONIC PAIN OF RIGHT ANKLE: Primary | ICD-10-CM

## 2019-11-11 DIAGNOSIS — M25.571 CHRONIC PAIN OF RIGHT ANKLE: ICD-10-CM

## 2019-11-11 DIAGNOSIS — G89.29 CHRONIC PAIN IN RIGHT FOOT: ICD-10-CM

## 2019-11-11 DIAGNOSIS — M72.2 PLANTAR FASCIITIS: ICD-10-CM

## 2019-11-11 DIAGNOSIS — G57.51 TARSAL TUNNEL SYNDROME OF RIGHT SIDE: ICD-10-CM

## 2019-11-11 DIAGNOSIS — G89.29 CHRONIC PAIN OF RIGHT ANKLE: ICD-10-CM

## 2019-11-11 DIAGNOSIS — M79.2 NEURITIS: ICD-10-CM

## 2019-11-11 DIAGNOSIS — G35 MS (MULTIPLE SCLEROSIS): ICD-10-CM

## 2019-11-11 LAB
AMPHET+METHAMPHET UR QL: NEGATIVE
BARBITURATES UR QL SCN>200 NG/ML: NEGATIVE
BENZODIAZ UR QL SCN>200 NG/ML: NEGATIVE
BZE UR QL SCN: NEGATIVE
CANNABINOIDS UR QL SCN: NEGATIVE
CREAT UR-MCNC: 256.2 MG/DL (ref 23–375)
OPIATES UR QL SCN: NORMAL
PCP UR QL SCN>25 NG/ML: NEGATIVE
TOXICOLOGY INFORMATION: NORMAL

## 2019-11-11 PROCEDURE — 99213 PR OFFICE/OUTPT VISIT, EST, LEVL III, 20-29 MIN: ICD-10-PCS | Mod: S$PBB,,, | Performed by: PODIATRIST

## 2019-11-11 PROCEDURE — 99999 PR PBB SHADOW E&M-EST. PATIENT-LVL III: ICD-10-PCS | Mod: PBBFAC,,, | Performed by: PODIATRIST

## 2019-11-11 PROCEDURE — 99999 PR PBB SHADOW E&M-EST. PATIENT-LVL III: CPT | Mod: PBBFAC,,, | Performed by: PODIATRIST

## 2019-11-11 PROCEDURE — 99213 OFFICE O/P EST LOW 20 MIN: CPT | Mod: S$PBB,,, | Performed by: PODIATRIST

## 2019-11-11 PROCEDURE — 99213 OFFICE O/P EST LOW 20 MIN: CPT | Mod: PBBFAC | Performed by: PODIATRIST

## 2019-11-11 PROCEDURE — 80307 DRUG TEST PRSMV CHEM ANLYZR: CPT

## 2019-11-11 RX ORDER — OXYCODONE AND ACETAMINOPHEN 10; 325 MG/1; MG/1
1 TABLET ORAL 4 TIMES DAILY
Qty: 120 TABLET | Refills: 0 | Status: SHIPPED | OUTPATIENT
Start: 2019-11-11 | End: 2019-12-11 | Stop reason: SDUPTHER

## 2019-11-11 NOTE — LETTER
November 15, 2019      [facility]  [address]  [place] [address]           [facility]  [address] [facility] [place] [address]  Phone: [phone]  Fax: [phone]          Patient: Sg Sesay   MR Number: [id number]   YOB: 1975   Date of Visit: 11/11/2019       Dear [facility]:    Thank you for referring Sg Sesay to me for evaluation. Attached you will find relevant portions of my assessment and plan of care.    If you have questions, please do not hesitate to call me. I look forward to following Sg Sesay along with you.    Sincerely,    Jah Torres, LISANDRO    Enclosure  CC:  No Recipients    If you would like to receive this communication electronically, please contact [email or web address] or [phone] to request more information on [employer] Link access.    For providers and/or their staff who would like to refer a patient to [facility], please contact us through our one-stop-shop provider referral line, [facility], at [phone].    If you feel you have received this communication in error or would no longer like to receive these types of communications, please e-mail [email or web address]

## 2019-11-11 NOTE — TELEPHONE ENCOUNTER
----- Message from Jah Torres DPM sent at 11/11/2019  2:12 PM CST -----  Please call the patient regarding his abnormal result.Drug screen fine rx sent.

## 2019-11-16 NOTE — PROGRESS NOTES
Subjective:       Patient ID: Sg Sesay is a 43 y.o. male.    Chief Complaint: Follow-up; Foot Pain; and Foot Problem  Patient presents for followed up of continued right foot pain he states he has been actually having more pain in his left foot because of compensating.  Patient relates arch pain left continued pain right.  Patient indicates he is very frustrated he was previously ordered physical therapy for his right foot [facility] however they have [facility] him for physical therapy at the same physical therapy facility for his back.     Follow-up   Associated symptoms include joint swelling.   Foot Pain   Associated symptoms include joint swelling.   Ankle Pain         Review of Systems   Musculoskeletal: Positive for gait problem and joint swelling.   All other systems reviewed and are negative.      Objective:      Physical Exam   Constitutional: He appears well-developed and well-nourished.   Cardiovascular:   Pulses:       Dorsalis pedis pulses are 2+ on the right side, and 2+ on the left side.        Posterior tibial pulses are 2+ on the right side, and 2+ on the left side.   Pulmonary/Chest: Effort normal.   Musculoskeletal: He exhibits edema, tenderness and deformity.        Right foot: There is decreased range of motion and deformity.   Feet:   Right Foot:   Protective Sensation: 4 sites tested. 4 sites sensed.   Left Foot:   Protective Sensation: 4 sites tested. 4 sites sensed.   Neurological: He is alert.   Skin: Skin is warm. Capillary refill takes less than 2 seconds.   Psychiatric: He has a normal mood and affect. His behavior is normal. Judgment and thought content normal.   Nursing note and vitals reviewed.    On evaluation all the patient's incisions are healed patient has less inflammation than previously noted there are no skin breaks no active signs of infection however patient does have a limited range of motion there still some degree of pain primarily in the  medial portion of the right foot however overall the area has improved some nerve related discomfort is appreciated.    Patient is complaining about Achilles tendon pain and tightness at the back of his right heel there is limited dorsiflexion noted due to excessive tightness in this area.  Patient has findings consistent with plantar fascial pain left.  Patient also has significant discomfort with associated inflammation in the region of the cuboid and base of the 5th metatarsal right.  Concern for cuboid syndrome noted.  Assessment:       1. Chronic pain of right ankle    2. Chronic pain in right foot    3. Plantar fasciitis - Left Foot    4. Primary osteoarthritis of right foot    5. Tarsal tunnel syndrome of right side    6. Neuritis    7. MS (multiple sclerosis)        Plan:          Patient presents today for follow-up of bilateral foot pain he relates the plantar fasciitis in the left foot is getting progressively worse he still having pain at the plantar fascial area of the right but now he is having even more pain at the base of the 5th metatarsal and the cuboid bone this raises concern for cuboid syndrome.    Patient indicated today the cuboid area on his right foot is not really his primary area of discomfort the greatest area that he is having discomfort is along the course of the Phan's nerve and tarsal tunnel left he states he feels as if this was not causing him any discomfort he could handle the pain on the outside of the right foot patient had question me possibly doing a nerve ablation on the left foot also I have advised him we can only do 1 foot at a time and certainly the right foot is more problematic I have made him aware that I would recommend the nerve ablation on the left foot but a plantar fascial release with respect shin resection of the heel spur would likely eliminate the pain he is having in the left foot patient was in understanding and agreement we have discussed at length and I  provided surgical consultation with the patient regarding a nerve ablation in the tarsal tunnel area right.  I did add some additional arch support to the patient's left over-the-counter support to see if this would help to address the plantar fascial pain that he is having on the left we did discuss at length and in detail the nerve ablation procedure in the area of the tarsal tunnel right patient states he wants to have this done however he has got something coming up in June that he would not likely be healed for I have recommended waiting until after that time he is going to think about this and consider when is the best time to do this.   Patient's  was evaluated prior to prescribing his pain medication as he is under my care for pain management regarding severe bilateral foot pain right greater than left.  Patient continues to have findings consistent with cuboid syndrome right I did discuss fusion of the calcaneal cuboid joint. Follow-up will be 1 month unless the patient has any problems questions or concerns prior to this. Follow-up 1 month.  Patient will likely need further surgical intervention right foot.     Patient understands he is going to likely need surgery for the cuboid syndrome with a calcaneal cuboid fusion right and nerve ablation right.  Patient states right now he knows he needs surgery on his right foot but he is going to put this on hold it got has a lot of things going on in his life I told him I certainly understand this.  Patient indicated basically without the pain medication and Lyrica that I give him for pain management of both lower extremities he would not be able to walk at all.  Patient states the MRI that he had done did show on MS lesion in his cervical area he also states that he has compression of the neck and the spine.  Patient's left foot is pretty much unchanged he understands he knows he needs surgery on the right foot but he has addressing these other issues in  relationship to his MS.  Updated drug screen was performed today and satisfactory.  Patient states he is planning on possibly pursuing surgery in January of next year.  This note was created using [employer] voice recognition software that occasionally misinterpreted phrases or words.

## 2019-12-11 ENCOUNTER — OFFICE VISIT (OUTPATIENT)
Dept: PODIATRY | Facility: CLINIC | Age: 44
End: 2019-12-11
Payer: COMMERCIAL

## 2019-12-11 VITALS
HEART RATE: 95 BPM | DIASTOLIC BLOOD PRESSURE: 82 MMHG | SYSTOLIC BLOOD PRESSURE: 127 MMHG | HEIGHT: 72 IN | BODY MASS INDEX: 27.09 KG/M2 | WEIGHT: 200 LBS

## 2019-12-11 DIAGNOSIS — M25.571 CHRONIC PAIN OF RIGHT ANKLE: ICD-10-CM

## 2019-12-11 DIAGNOSIS — G89.29 CHRONIC PAIN IN RIGHT FOOT: ICD-10-CM

## 2019-12-11 DIAGNOSIS — G57.51 TARSAL TUNNEL SYNDROME OF RIGHT SIDE: ICD-10-CM

## 2019-12-11 DIAGNOSIS — M79.671 CHRONIC PAIN IN RIGHT FOOT: ICD-10-CM

## 2019-12-11 DIAGNOSIS — M19.071 PRIMARY OSTEOARTHRITIS OF RIGHT FOOT: ICD-10-CM

## 2019-12-11 DIAGNOSIS — M79.2 NEURITIS: Primary | ICD-10-CM

## 2019-12-11 DIAGNOSIS — M72.2 PLANTAR FASCIITIS: ICD-10-CM

## 2019-12-11 DIAGNOSIS — G89.29 CHRONIC PAIN OF RIGHT ANKLE: ICD-10-CM

## 2019-12-11 DIAGNOSIS — G35 MS (MULTIPLE SCLEROSIS): ICD-10-CM

## 2019-12-11 PROCEDURE — 99999 PR PBB SHADOW E&M-EST. PATIENT-LVL III: ICD-10-PCS | Mod: PBBFAC,,, | Performed by: PODIATRIST

## 2019-12-11 PROCEDURE — 99213 PR OFFICE/OUTPT VISIT, EST, LEVL III, 20-29 MIN: ICD-10-PCS | Mod: S$PBB,,, | Performed by: PODIATRIST

## 2019-12-11 PROCEDURE — 99999 PR PBB SHADOW E&M-EST. PATIENT-LVL III: CPT | Mod: PBBFAC,,, | Performed by: PODIATRIST

## 2019-12-11 PROCEDURE — 99213 OFFICE O/P EST LOW 20 MIN: CPT | Mod: PBBFAC | Performed by: PODIATRIST

## 2019-12-11 PROCEDURE — 99213 OFFICE O/P EST LOW 20 MIN: CPT | Mod: S$PBB,,, | Performed by: PODIATRIST

## 2019-12-11 RX ORDER — OXYCODONE AND ACETAMINOPHEN 10; 325 MG/1; MG/1
1 TABLET ORAL 4 TIMES DAILY
Qty: 120 TABLET | Refills: 0 | Status: SHIPPED | OUTPATIENT
Start: 2019-12-11 | End: 2020-01-10

## 2019-12-11 NOTE — LETTER
December 14, 2019      [facility]  [address]  [place] [address]           [facility]  [address] [facility] [place] [address]  Phone: [phone]  Fax: [phone]          Patient: Sg Sesay   MR Number: [id number]   YOB: 1975   Date of Visit: 12/11/2019       Dear [facility]:    Thank you for referring Sg Sesay to me for evaluation. Attached you will find relevant portions of my assessment and plan of care.    If you have questions, please do not hesitate to call me. I look forward to following Sg Sesay along with you.    Sincerely,    Jah Torres, LISANDRO    Enclosure  CC:  No Recipients    If you would like to receive this communication electronically, please contact [email or web address] or [phone] to request more information on [employer] Link access.    For providers and/or their staff who would like to refer a patient to [facility], please contact us through our one-stop-shop provider referral line, [facility], at [phone].    If you feel you have received this communication in error or would no longer like to receive these types of communications, please e-mail [email or web address]

## 2019-12-14 NOTE — PROGRESS NOTES
Subjective:       Patient ID: Sg Sesay is a 43 y.o. male.    Chief Complaint: Follow-up  Patient presents for followed up of continued right foot pain he states he has been actually having more pain in his left foot because of compensating.       Follow-up   Associated symptoms include joint swelling.   Foot Pain   Associated symptoms include joint swelling.   Ankle Pain         Review of Systems   Musculoskeletal: Positive for gait problem and joint swelling.   All other systems reviewed and are negative.      Objective:      Physical Exam   Constitutional: He appears well-developed and well-nourished.   Cardiovascular:   Pulses:       Dorsalis pedis pulses are 2+ on the right side, and 2+ on the left side.        Posterior tibial pulses are 2+ on the right side, and 2+ on the left side.   Pulmonary/Chest: Effort normal.   Musculoskeletal: He exhibits edema, tenderness and deformity.        Right foot: There is decreased range of motion and deformity.   Feet:   Right Foot:   Protective Sensation: 4 sites tested. 4 sites sensed.   Left Foot:   Protective Sensation: 4 sites tested. 4 sites sensed.   Neurological: He is alert.   Skin: Skin is warm. Capillary refill takes less than 2 seconds.   Psychiatric: He has a normal mood and affect. His behavior is normal. Judgment and thought content normal.   Nursing note and vitals reviewed.      Assessment:       1. Neuritis    2. Tarsal tunnel syndrome of right side    3. Chronic pain in right foot    4. Chronic pain of right ankle    5. Primary osteoarthritis of right foot    6. Plantar fasciitis - Left Foot    7. MS (multiple sclerosis)        Plan:          Patient presents today for follow-up of bilateral foot pain he relates the plantar fasciitis in the left foot is getting progressively worse he still having pain at the plantar fascial area of the right but now he is having even more pain at the base of the 5th metatarsal and the cuboid bone this raises  concern for cuboid syndrome.    Patient indicated today the cuboid area on his right foot is not really his primary area of discomfort the greatest area that he is having discomfort is along the course of the Phan's nerve and tarsal tunnel left he states he feels as if this was not causing him any discomfort he could handle the pain on the outside of the right foot patient had question me possibly doing a nerve ablation on the left foot also I have advised him we can only do 1 foot at a time and certainly the right foot is more problematic I have made him aware that I would recommend the nerve ablation on the left foot but a plantar fascial release with respect shin resection of the heel spur would likely eliminate the pain he is having in the left foot patient was in understanding and agreement we have discussed at length and I provided surgical consultation with the patient regarding a nerve ablation in the tarsal tunnel area right.  I did add some additional arch support to the patient's left over-the-counter support to see if this would help to address the plantar fascial pain that he is having on the left we did discuss at length and in detail the nerve ablation procedure in the area of the tarsal tunnel right patient states he wants to have this done however he has got something coming up in June that he would not likely be healed for I have recommended waiting until after that time he is going to think about this and consider when is the best time to do this.   Patient's  was evaluated prior to prescribing his pain medication as he is under my care for pain management regarding severe bilateral foot pain right greater than left.  Patient continues to have findings consistent with cuboid syndrome right I did discuss fusion of the calcaneal cuboid joint. Follow-up will be 1 month unless the patient has any problems questions or concerns prior to this. Follow-up 1 month.  Patient will likely need further  surgical intervention right foot.     Patient understands he is going to likely need surgery for the cuboid syndrome with a calcaneal cuboid fusion right and nerve ablation right.  Patient states right now he knows he needs surgery on his right foot but he is going to put this on hold it got has a lot of things going on in his life I told him I certainly understand this.  Patient indicated basically without the pain medication and Lyrica that I give him for pain management of both lower extremities he would not be able to walk at all.   Patient's left foot is pretty much unchanged he understands he knows he needs surgery on the right foot but he has addressing these other issues in relationship to his MS.   Patient states he is planning on possibly pursuing surgery in January of next year.  Patient indicated that he does have bulging discs at L5 and S1 he states he is scheduled to have an ablation procedure at the end of this month he is hoping that this will help address his back pain.  I did discuss an ablation procedure of the patient's nerve pain on the right foot as well as a calcaneal cuboid fusion as previously discussed.  Patient's p.m. P was evaluated pain contract is up-to-date.  Patient's injuries can likely be traced back to his  service.  This note was created using [employer] voice recognition software that occasionally misinterpreted phrases or words.

## 2020-01-13 ENCOUNTER — OFFICE VISIT (OUTPATIENT)
Dept: PODIATRY | Facility: CLINIC | Age: 45
End: 2020-01-13
Payer: COMMERCIAL

## 2020-01-13 VITALS
WEIGHT: 200 LBS | HEART RATE: 79 BPM | TEMPERATURE: 98 F | SYSTOLIC BLOOD PRESSURE: 139 MMHG | HEIGHT: 72 IN | BODY MASS INDEX: 27.09 KG/M2 | DIASTOLIC BLOOD PRESSURE: 90 MMHG

## 2020-01-13 DIAGNOSIS — M19.071 PRIMARY OSTEOARTHRITIS OF RIGHT FOOT: ICD-10-CM

## 2020-01-13 DIAGNOSIS — G35 MS (MULTIPLE SCLEROSIS): ICD-10-CM

## 2020-01-13 DIAGNOSIS — M79.671 CHRONIC PAIN IN RIGHT FOOT: Primary | ICD-10-CM

## 2020-01-13 DIAGNOSIS — M72.2 PLANTAR FASCIITIS: ICD-10-CM

## 2020-01-13 DIAGNOSIS — G89.29 CHRONIC PAIN OF RIGHT ANKLE: ICD-10-CM

## 2020-01-13 DIAGNOSIS — G57.51 TARSAL TUNNEL SYNDROME OF RIGHT SIDE: ICD-10-CM

## 2020-01-13 DIAGNOSIS — M79.2 NEURITIS: ICD-10-CM

## 2020-01-13 DIAGNOSIS — G89.29 CHRONIC PAIN IN RIGHT FOOT: Primary | ICD-10-CM

## 2020-01-13 DIAGNOSIS — M25.571 CHRONIC PAIN OF RIGHT ANKLE: ICD-10-CM

## 2020-01-13 PROCEDURE — 99213 OFFICE O/P EST LOW 20 MIN: CPT | Mod: PBBFAC | Performed by: PODIATRIST

## 2020-01-13 PROCEDURE — 99213 PR OFFICE/OUTPT VISIT, EST, LEVL III, 20-29 MIN: ICD-10-PCS | Mod: S$PBB,,, | Performed by: PODIATRIST

## 2020-01-13 PROCEDURE — 99999 PR PBB SHADOW E&M-EST. PATIENT-LVL III: CPT | Mod: PBBFAC,,, | Performed by: PODIATRIST

## 2020-01-13 PROCEDURE — 99213 OFFICE O/P EST LOW 20 MIN: CPT | Mod: S$PBB,,, | Performed by: PODIATRIST

## 2020-01-13 PROCEDURE — 99999 PR PBB SHADOW E&M-EST. PATIENT-LVL III: ICD-10-PCS | Mod: PBBFAC,,, | Performed by: PODIATRIST

## 2020-01-13 RX ORDER — OXYCODONE AND ACETAMINOPHEN 10; 325 MG/1; MG/1
1 TABLET ORAL 4 TIMES DAILY
Qty: 120 TABLET | Refills: 0 | Status: SHIPPED | OUTPATIENT
Start: 2020-01-13 | End: 2020-02-10 | Stop reason: SDUPTHER

## 2020-01-13 NOTE — LETTER
January 14, 2020      [facility]  [address]  [place] [address]           [facility]  [address] [place] [address]  Phone: [phone]  Fax: [phone]          Patient: Sg Sesay   MR Number: [id number]   YOB: 1975   Date of Visit: 1/13/2020       Dear [facility]:    Thank you for referring Sg Sesay to me for evaluation. Attached you will find relevant portions of my assessment and plan of care.    If you have questions, please do not hesitate to call me. I look forward to following Sg Sesay along with you.    Sincerely,    Jah Torres, LISANDRO    Enclosure  CC:  No Recipients    If you would like to receive this communication electronically, please contact [email or web address] or [phone] to request more information on [employer] Link access.    For providers and/or their staff who would like to refer a patient to [facility], please contact us through our one-stop-shop provider referral line, [facility], at [phone].    If you feel you have received this communication in error or would no longer like to receive these types of communications, please e-mail [email or web address]

## 2020-01-14 NOTE — PROGRESS NOTES
Subjective:       Patient ID: Sg Sesay is a 44 y.o. male.    Chief Complaint: No chief complaint on file.  Patient presents for followed up of continued right foot pain he states he has been actually having more pain in his left foot because of compensating.       Follow-up   Associated symptoms include joint swelling.   Foot Pain   Associated symptoms include joint swelling.   Ankle Pain         Review of Systems   Musculoskeletal: Positive for gait problem and joint swelling.   All other systems reviewed and are negative.      Objective:      Physical Exam   Constitutional: He appears well-developed and well-nourished.   Cardiovascular:   Pulses:       Dorsalis pedis pulses are 2+ on the right side, and 2+ on the left side.        Posterior tibial pulses are 2+ on the right side, and 2+ on the left side.   Pulmonary/Chest: Effort normal.   Musculoskeletal: He exhibits edema, tenderness and deformity.        Right foot: There is decreased range of motion and deformity.   Feet:   Right Foot:   Protective Sensation: 4 sites tested. 4 sites sensed.   Left Foot:   Protective Sensation: 4 sites tested. 4 sites sensed.   Neurological: He is alert.   Skin: Skin is warm. Capillary refill takes less than 2 seconds.   Psychiatric: He has a normal mood and affect. His behavior is normal. Judgment and thought content normal.   Nursing note and vitals reviewed.      Assessment:       1. Chronic pain in right foot    2. Chronic pain of right ankle    3. Primary osteoarthritis of right foot    4. Plantar fasciitis - Left Foot    5. Neuritis    6. MS (multiple sclerosis)    7. Tarsal tunnel syndrome of right side        Plan:          Patient presents today for follow-up of bilateral foot pain he relates the plantar fasciitis in the left foot is getting progressively worse he still having pain at the plantar fascial area of the right but now he is having even more pain at the base of the 5th metatarsal and the cuboid bone  this raises concern for cuboid syndrome.    Patient indicated today the cuboid area on his right foot is not really his primary area of discomfort the greatest area that he is having discomfort is along the course of the Phan's nerve and tarsal tunnel left he states he feels as if this was not causing him any discomfort he could handle the pain on the outside of the right foot patient had question me possibly doing a nerve ablation on the left foot also I have advised him we can only do 1 foot at a time and certainly the right foot is more problematic I have made him aware that I would recommend the nerve ablation on the left foot but a plantar fascial release with respect shin resection of the heel spur would likely eliminate the pain he is having in the left foot patient was in understanding and agreement we have discussed at length and I provided surgical consultation with the patient regarding a nerve ablation in the tarsal tunnel area right.   Patient's  was evaluated prior to prescribing his pain medication as he is under my care for pain management regarding severe bilateral foot pain right greater than left.  Patient continues to have findings consistent with cuboid syndrome right I did discuss fusion of the calcaneal cuboid joint. Follow-up will be 1 month unless the patient has any problems questions or concerns prior to this. Follow-up 1 month.  Patient will likely need further surgical intervention right foot.     Patient understands he is going to likely need surgery for the cuboid syndrome with a calcaneal cuboid fusion right and nerve ablation right.  Patient states right now he knows he needs surgery on his right foot but he is going to put this on hold it got has a lot of things going on in his life I told him I certainly understand this.  Patient indicated basically without the pain medication and Lyrica that I give him for pain management of both lower extremities he would not be able to walk  at all.   Patient's left foot is pretty much unchanged he understands he knows he needs surgery on the right foot but he has addressing these other issues in relationship to his MS.   Patient states he is planning on possibly pursuing surgery in the future.  Patient indicated that he does have bulging discs at L5 and S1 he states he   Did recently have steroid injections for his back however these have not helped he is scheduled to go back to see the provider that is treating his back within the next week patient states he is not sure what the next step is in treating his back pain.  Patient believes his right foot is getting worse and his left foot is starting to get close to the level of his right foot.  Patient is having increased back pain.  I did discuss an ablation procedure of the patient's nerve pain on the right foot as well as a calcaneal cuboid fusion as previously discussed.  Patient's p.m. P was evaluated pain contract is up-to-date.  Patient's injuries can likely be traced back to his  service.   Patient states he wants to determine what's going on with his back in the next course for treatment of his back before pursuing the next step surgical procedure on the right foot.  Follow-up 1 month patient has an up-to-date drug screen on file. This note was created using [employer] voice recognition software that occasionally misinterpreted phrases or words.

## 2020-02-10 ENCOUNTER — TELEPHONE (OUTPATIENT)
Dept: PODIATRY | Facility: CLINIC | Age: 45
End: 2020-02-10

## 2020-02-10 ENCOUNTER — OFFICE VISIT (OUTPATIENT)
Dept: PODIATRY | Facility: CLINIC | Age: 45
End: 2020-02-10
Payer: MEDICARE

## 2020-02-10 ENCOUNTER — LAB VISIT (OUTPATIENT)
Dept: LAB | Facility: HOSPITAL | Age: 45
End: 2020-02-10
Attending: PODIATRIST
Payer: MEDICARE

## 2020-02-10 VITALS
SYSTOLIC BLOOD PRESSURE: 130 MMHG | HEIGHT: 72 IN | BODY MASS INDEX: 27.09 KG/M2 | DIASTOLIC BLOOD PRESSURE: 81 MMHG | WEIGHT: 200 LBS | TEMPERATURE: 98 F | HEART RATE: 79 BPM

## 2020-02-10 DIAGNOSIS — M79.2 NEURITIS: ICD-10-CM

## 2020-02-10 DIAGNOSIS — M79.2 NEURITIS: Primary | ICD-10-CM

## 2020-02-10 DIAGNOSIS — G35 MS (MULTIPLE SCLEROSIS): ICD-10-CM

## 2020-02-10 DIAGNOSIS — M72.2 PLANTAR FASCIITIS: ICD-10-CM

## 2020-02-10 DIAGNOSIS — G57.51 TARSAL TUNNEL SYNDROME OF RIGHT SIDE: ICD-10-CM

## 2020-02-10 DIAGNOSIS — M19.071 PRIMARY OSTEOARTHRITIS OF RIGHT FOOT: ICD-10-CM

## 2020-02-10 DIAGNOSIS — G89.29 CHRONIC PAIN IN RIGHT FOOT: ICD-10-CM

## 2020-02-10 DIAGNOSIS — M79.671 CHRONIC PAIN IN RIGHT FOOT: ICD-10-CM

## 2020-02-10 LAB
AMPHET+METHAMPHET UR QL: NEGATIVE
BARBITURATES UR QL SCN>200 NG/ML: NEGATIVE
BENZODIAZ UR QL SCN>200 NG/ML: NEGATIVE
BZE UR QL SCN: NEGATIVE
CANNABINOIDS UR QL SCN: NEGATIVE
CREAT UR-MCNC: 291.4 MG/DL (ref 23–375)
OPIATES UR QL SCN: NORMAL
PCP UR QL SCN>25 NG/ML: NEGATIVE
TOXICOLOGY INFORMATION: NORMAL

## 2020-02-10 PROCEDURE — 99213 OFFICE O/P EST LOW 20 MIN: CPT | Mod: PBBFAC | Performed by: PODIATRIST

## 2020-02-10 PROCEDURE — 99999 PR PBB SHADOW E&M-EST. PATIENT-LVL III: ICD-10-PCS | Mod: PBBFAC,,, | Performed by: PODIATRIST

## 2020-02-10 PROCEDURE — 99213 OFFICE O/P EST LOW 20 MIN: CPT | Mod: S$PBB,,, | Performed by: PODIATRIST

## 2020-02-10 PROCEDURE — 99213 PR OFFICE/OUTPT VISIT, EST, LEVL III, 20-29 MIN: ICD-10-PCS | Mod: S$PBB,,, | Performed by: PODIATRIST

## 2020-02-10 PROCEDURE — 99999 PR PBB SHADOW E&M-EST. PATIENT-LVL III: CPT | Mod: PBBFAC,,, | Performed by: PODIATRIST

## 2020-02-10 PROCEDURE — 80307 DRUG TEST PRSMV CHEM ANLYZR: CPT

## 2020-02-10 RX ORDER — OXYCODONE AND ACETAMINOPHEN 10; 325 MG/1; MG/1
1 TABLET ORAL 4 TIMES DAILY
Qty: 120 TABLET | Refills: 0 | Status: SHIPPED | OUTPATIENT
Start: 2020-02-10 | End: 2020-03-11 | Stop reason: SDUPTHER

## 2020-02-10 RX ORDER — PREGABALIN 150 MG/1
150 CAPSULE ORAL 3 TIMES DAILY
Qty: 90 CAPSULE | Refills: 6 | Status: SHIPPED | OUTPATIENT
Start: 2020-02-10 | End: 2020-03-16 | Stop reason: SDUPTHER

## 2020-02-10 NOTE — PROGRESS NOTES
Subjective:       Patient ID: Sg Sesay is a 44 y.o. male.    Chief Complaint: Follow-up; Foot Pain; and Foot Problem  Patient presents for followed up of continued right foot pain he states he has been actually having more pain in his left foot because of compensating.       Follow-up   Associated symptoms include joint swelling.   Foot Pain   Associated symptoms include joint swelling.   Ankle Pain         Review of Systems   Musculoskeletal: Positive for gait problem and joint swelling.   All other systems reviewed and are negative.      Objective:      Physical Exam   Constitutional: He appears well-developed and well-nourished.   Cardiovascular:   Pulses:       Dorsalis pedis pulses are 2+ on the right side, and 2+ on the left side.        Posterior tibial pulses are 2+ on the right side, and 2+ on the left side.   Pulmonary/Chest: Effort normal.   Musculoskeletal: He exhibits edema, tenderness and deformity.        Right foot: There is decreased range of motion and deformity.   Feet:   Right Foot:   Protective Sensation: 4 sites tested. 4 sites sensed.   Left Foot:   Protective Sensation: 4 sites tested. 4 sites sensed.   Neurological: He is alert.   Skin: Skin is warm. Capillary refill takes less than 2 seconds.   Psychiatric: He has a normal mood and affect. His behavior is normal. Judgment and thought content normal.   Nursing note and vitals reviewed.      Assessment:       1. Neuritis    2. Primary osteoarthritis of right foot    3. Chronic pain in right foot    4. Plantar fasciitis - Left Foot    5. Tarsal tunnel syndrome of right side    6. MS (multiple sclerosis)        Plan:          Patient presents today for follow-up of bilateral foot pain he relates the plantar fasciitis in the left foot is getting progressively worse he still having pain at the plantar fascial area of the right but now he is having even more pain at the base of the 5th metatarsal and the cuboid bone this raises concern  for cuboid syndrome.    Patient indicated today the cuboid area on his right foot is not really his primary area of discomfort the greatest area that he is having discomfort is along the course of the Phan's nerve and tarsal tunnel left he states he feels as if this was not causing him any discomfort he could handle the pain on the outside of the right foot patient had question me possibly doing a nerve ablation on the left foot also I have advised him we can only do 1 foot at a time and certainly the right foot is more problematic I have made him aware that I would recommend the nerve ablation on the left foot but a plantar fascial release with respect shin resection of the heel spur would likely eliminate the pain he is having in the left foot patient was in understanding and agreement we have discussed at length and I provided surgical consultation with the patient regarding a nerve ablation in the tarsal tunnel area right.   Patient's  was evaluated prior to prescribing his pain medication as he is under my care for pain management regarding severe bilateral foot pain right greater than left.  Patient continues to have findings consistent with cuboid syndrome right I did discuss fusion of the calcaneal cuboid joint. Follow-up will be 1 month unless the patient has any problems questions or concerns prior to this. Follow-up 1 month.  Patient will likely need further surgical intervention right foot.     Patient understands he is going to likely need surgery for the cuboid syndrome with a calcaneal cuboid fusion right and nerve ablation right.  Patient states right now he knows he needs surgery on his right foot but he is going to put this on hold it got has a lot of things going on in his life I told him I certainly understand this.  Patient indicated basically without the pain medication and Lyrica that I give him for pain management of both lower extremities he would not be able to walk at all.   Patient's  left foot is pretty much unchanged he understands he knows he needs surgery on the right foot but he has addressing these other issues in relationship to his MS.   Patient states he is planning on possibly pursuing surgery in the future.  Patient indicated that he does have bulging discs at L5 and S1 he states he   Did recently have steroid injections for his back however these have not helped he is scheduled to go back to see the provider that is treating his back within the next week patient states he is not sure what the next step is in treating his back pain.  Patient believes his right foot is getting worse and his left foot is starting to get close to the level of his right foot.  Patient is having increased back pain.  I did discuss an ablation procedure of the patient's nerve pain on the right foot as well as a calcaneal cuboid fusion as previously discussed.  Patient's p.m. P was evaluated pain contract is up-to-date.  Patient's injuries can likely be traced back to his  service.   Patient states he wants to determine what's going on with his back in the next course for treatment of his back before pursuing the next step surgical procedure on the right foot.  Patient's drug screen was ordered today and noted to be satisfactory.  Plan follow-up will be 1 month.  Patient's follow-up with his back doctor has been delayed he is now scheduled to see him on February 18th he states that the previous referral E had ran out.  Patient indicates he wants to find out what his next treatment options are for his back before he proceeds with surgery on his right foot. I did discuss a steroid injection today of the left foot to address the inflammation along the plantar fascia patient elected to defer this at this time.  Refill dispensed on Lyrica.  This note was created using [employer] voice recognition software that occasionally misinterpreted phrases or words.

## 2020-02-10 NOTE — LETTER
February 10, 2020      [facility]  [address]  [place] [address]           [facility]ound [facility]  [address] [place] [address]  Phone: [phone]  Fax: [phone]          Patient: Sg Sesay   MR Number: [id number]   YOB: 1975   Date of Visit: 2/10/2020       Dear [facility]:    Thank you for referring Sg Sesay to me for evaluation. Attached you will find relevant portions of my assessment and plan of care.    If you have questions, please do not hesitate to call me. I look forward to following Sg Sesay along with you.    Sincerely,    Jah Torres, LISANDRO    Enclosure  CC:  No Recipients    If you would like to receive this communication electronically, please contact [email or web address] or [phone] to request more information on [employer] Link access.    For providers and/or their staff who would like to refer a patient to [facility], please contact us through our one-stop-shop provider referral line, [facility], at [phone].    If you feel you have received this communication in error or would no longer like to receive these types of communications, please e-mail [email or web address]

## 2020-02-10 NOTE — TELEPHONE ENCOUNTER
----- Message from Jah Torres DPM sent at 2/10/2020  9:04 AM CST -----  Please call the patient regarding his abnormal result.Drug screen fine rx sent.

## 2020-03-09 ENCOUNTER — TELEPHONE (OUTPATIENT)
Dept: PODIATRY | Facility: CLINIC | Age: 45
End: 2020-03-09

## 2020-03-09 NOTE — TELEPHONE ENCOUNTER
LVM for Pt.  to check with pharmacy to see when Rx will be ready for .   negative No joint pain, swelling or deformity; no limitation of movement

## 2020-03-09 NOTE — TELEPHONE ENCOUNTER
----- Message from Jah Torres DPM sent at 3/9/2020 12:25 PM CDT -----  Contact: patient  Its always sent in generic I didn't  write for brand name.  ----- Message -----  From: Brinda Gracia LPN  Sent: 3/9/2020  12:11 PM CDT  To: Jah Torres DPM        ----- Message -----  From: Arpita Hutton  Sent: 3/9/2020  12:00 PM CDT  To: [facility] Staff    Type: Needs Medical Advice    Who Called:  Patient  Pharmacy name and phone #:    [id number] - [place] [address] AT [address] [facility] [address]  [address] [address]  Phone: [phone] Fax: [phone]  Best Call Back Number: [phone]   Additional Information: Per patient prescription LYRICA 150 mg capsule that was called in needs to be generic in order for insurance to cover-please advise-thank you

## 2020-03-11 ENCOUNTER — OFFICE VISIT (OUTPATIENT)
Dept: PODIATRY | Facility: CLINIC | Age: 45
End: 2020-03-11
Payer: MEDICARE

## 2020-03-11 VITALS
SYSTOLIC BLOOD PRESSURE: 143 MMHG | DIASTOLIC BLOOD PRESSURE: 95 MMHG | BODY MASS INDEX: 27.09 KG/M2 | HEART RATE: 69 BPM | WEIGHT: 200 LBS | HEIGHT: 72 IN | TEMPERATURE: 98 F

## 2020-03-11 DIAGNOSIS — M19.071 PRIMARY OSTEOARTHRITIS OF RIGHT FOOT: ICD-10-CM

## 2020-03-11 DIAGNOSIS — G35 MS (MULTIPLE SCLEROSIS): ICD-10-CM

## 2020-03-11 DIAGNOSIS — M79.2 NEURITIS: Primary | ICD-10-CM

## 2020-03-11 DIAGNOSIS — S93.311A CUBOID SYNDROME OF RIGHT FOOT: ICD-10-CM

## 2020-03-11 DIAGNOSIS — G57.51 TARSAL TUNNEL SYNDROME OF RIGHT SIDE: ICD-10-CM

## 2020-03-11 PROCEDURE — 99999 PR PBB SHADOW E&M-EST. PATIENT-LVL III: ICD-10-PCS | Mod: PBBFAC,,, | Performed by: PODIATRIST

## 2020-03-11 PROCEDURE — 99213 PR OFFICE/OUTPT VISIT, EST, LEVL III, 20-29 MIN: ICD-10-PCS | Mod: S$PBB,,, | Performed by: PODIATRIST

## 2020-03-11 PROCEDURE — 99213 OFFICE O/P EST LOW 20 MIN: CPT | Mod: PBBFAC | Performed by: PODIATRIST

## 2020-03-11 PROCEDURE — 99999 PR PBB SHADOW E&M-EST. PATIENT-LVL III: CPT | Mod: PBBFAC,,, | Performed by: PODIATRIST

## 2020-03-11 PROCEDURE — 99213 OFFICE O/P EST LOW 20 MIN: CPT | Mod: S$PBB,,, | Performed by: PODIATRIST

## 2020-03-11 RX ORDER — OXYCODONE AND ACETAMINOPHEN 10; 325 MG/1; MG/1
1 TABLET ORAL 4 TIMES DAILY
Qty: 120 TABLET | Refills: 0 | Status: SHIPPED | OUTPATIENT
Start: 2020-03-11 | End: 2020-04-10

## 2020-03-11 RX ORDER — DICLOFENAC SODIUM 75 MG/1
TABLET, DELAYED RELEASE ORAL
COMMUNITY
Start: 2020-02-13 | End: 2020-04-13 | Stop reason: CLARIF

## 2020-03-11 NOTE — LETTER
March 14, 2020      [facility]  [address]  [place] [address]           [facility]ry[facility]  [address] [place] [address]  Phone: [phone]  Fax: [phone]          Patient: gS Sesay   MR Number: [id number]   YOB: 1975   Date of Visit: 3/11/2020       Dear [facility]:    Thank you for referring Sg Sesay to me for evaluation. Attached you will find relevant portions of my assessment and plan of care.    If you have questions, please do not hesitate to call me. I look forward to following Sg Sesay along with you.    Sincerely,    Jah Torres, LISANDRO    Enclosure  CC:  No Recipients    If you would like to receive this communication electronically, please contact [email or web address] or [phone] to request more information on [employer] Link access.    For providers and/or their staff who would like to refer a patient to [facility], please contact us through our one-stop-shop provider referral line, [facility], at [phone].    If you feel you have received this communication in error or would no longer like to receive these types of communications, please e-mail [email or web address]

## 2020-03-15 NOTE — PROGRESS NOTES
Subjective:       Patient ID: Sg Sesay is a 44 y.o. male.    Chief Complaint: Follow-up; Foot Pain; and Foot Problem  Patient presents for followed up of continued right foot pain.         Follow-up   Associated symptoms include joint swelling.   Foot Pain   Associated symptoms include joint swelling.   Ankle Pain         Review of Systems   Musculoskeletal: Positive for gait problem and joint swelling.   All other systems reviewed and are negative.      Objective:      Physical Exam   Constitutional: He appears well-developed and well-nourished.   Cardiovascular:   Pulses:       Dorsalis pedis pulses are 2+ on the right side, and 2+ on the left side.        Posterior tibial pulses are 2+ on the right side, and 2+ on the left side.   Pulmonary/Chest: Effort normal.   Musculoskeletal: He exhibits edema, tenderness and deformity.        Right foot: There is decreased range of motion and deformity.   Feet:   Right Foot:   Protective Sensation: 4 sites tested. 4 sites sensed.   Left Foot:   Protective Sensation: 4 sites tested. 4 sites sensed.   Neurological: He is alert.   Skin: Skin is warm. Capillary refill takes less than 2 seconds.   Psychiatric: He has a normal mood and affect. His behavior is normal. Judgment and thought content normal.   Nursing note and vitals reviewed.      Assessment:       1. Neuritis    2. Tarsal tunnel syndrome of right side    3. MS (multiple sclerosis)    4. Cuboid syndrome of right foot    5. Primary osteoarthritis of right foot        Plan:          Patient presents today for follow-up of bilateral foot pain he relates the plantar fasciitis in the left foot is getting progressively worse he still having pain at the plantar fascial area of the right but now he is having even more pain at the base of the 5th metatarsal and the cuboid bone this raises concern for cuboid syndrome.    Patient indicated today the cuboid area on his right foot is not really his primary area of  discomfort the greatest area that he is having discomfort is along the course of the Phan's nerve and tarsal tunnel left he states he feels as if this was not causing him any discomfort he could handle the pain on the outside of the right foot patient had question me possibly doing a nerve ablation on the left foot also I have advised him we can only do 1 foot at a time and certainly the right foot is more problematic I have made him aware that I would recommend the nerve ablation on the left foot but a plantar fascial release with respect shin resection of the heel spur would likely eliminate the pain he is having in the left foot patient was in understanding and agreement we have discussed at length and I provided surgical consultation with the patient regarding a nerve ablation in the tarsal tunnel area right.   Patient's  was evaluated prior to prescribing his pain medication as he is under my care for pain management regarding severe bilateral foot pain right greater than left.  Patient continues to have findings consistent with cuboid syndrome right I did discuss fusion of the calcaneal cuboid joint. Follow-up will be 1 month unless the patient has any problems questions or concerns prior to this. Follow-up 1 month.  Patient will likely need further surgical intervention right foot.     Patient understands he is going to likely need surgery for the cuboid syndrome with a calcaneal cuboid fusion right and nerve ablation right.  Patient states right now he knows he needs surgery on his right foot but he is going to put this on hold it got has a lot of things going on in his life I told him I certainly understand this.  Patient indicated basically without the pain medication and Lyrica that I give him for pain management of both lower extremities he would not be able to walk at all.   Patient's left foot is pretty much unchanged he understands he knows he needs surgery on the right foot but he has addressing  these other issues in relationship to his MS.   Patient states he is planning on possibly pursuing surgery in the future.  Patient indicated that he does have bulging discs at L5 and S1 he states he   Did recently have steroid injections for his back however these have not helped.  Patient states the provider that he was seeing who gave him the steroid injections has advised him there is nothing more that he can do for him and has referred him to a surgeon for potential back surgery.  Patient believes his right foot is getting worse and his left foot is starting to get close to the level of his right foot.  Patient is having increased back pain.  I did discuss an ablation procedure of the patient's nerve pain on the right foot as well as a calcaneal cuboid fusion as previously discussed.  Patient's p.m. P was evaluated pain contract is up-to-date.  Patient's injuries can likely be traced back to his  service.   Patient states he wants to determine what's going on with his back in the next course for treatment of his back before pursuing the next step surgical procedure on the right foot.  Patient's drug screen was ordered today and noted to be satisfactory.  Plan follow-up will be 1 month.  Patient is currently under my care for pain management his p.m. P was evaluated he has a current up-to-date pain management contract and drug screen.  This note was created using [employer] voice recognition software that occasionally misinterpreted phrases or words.

## 2020-03-16 ENCOUNTER — TELEPHONE (OUTPATIENT)
Dept: PODIATRY | Facility: CLINIC | Age: 45
End: 2020-03-16

## 2020-03-16 RX ORDER — PREGABALIN 150 MG/1
150 CAPSULE ORAL 3 TIMES DAILY
Qty: 90 CAPSULE | Refills: 5 | Status: SHIPPED | OUTPATIENT
Start: 2020-03-16 | End: 2020-10-07

## 2020-04-13 ENCOUNTER — OFFICE VISIT (OUTPATIENT)
Dept: PODIATRY | Facility: CLINIC | Age: 45
End: 2020-04-13
Payer: MEDICARE

## 2020-04-13 VITALS
HEART RATE: 78 BPM | HEIGHT: 72 IN | TEMPERATURE: 98 F | DIASTOLIC BLOOD PRESSURE: 88 MMHG | BODY MASS INDEX: 27.09 KG/M2 | WEIGHT: 200 LBS | SYSTOLIC BLOOD PRESSURE: 137 MMHG

## 2020-04-13 DIAGNOSIS — M79.671 CHRONIC PAIN IN RIGHT FOOT: ICD-10-CM

## 2020-04-13 DIAGNOSIS — M79.2 NEURITIS: ICD-10-CM

## 2020-04-13 DIAGNOSIS — G89.29 CHRONIC PAIN IN RIGHT FOOT: ICD-10-CM

## 2020-04-13 DIAGNOSIS — M19.071 PRIMARY OSTEOARTHRITIS OF RIGHT FOOT: ICD-10-CM

## 2020-04-13 DIAGNOSIS — G35 MS (MULTIPLE SCLEROSIS): ICD-10-CM

## 2020-04-13 DIAGNOSIS — S93.311A CUBOID SYNDROME OF RIGHT FOOT: Primary | ICD-10-CM

## 2020-04-13 DIAGNOSIS — M72.2 PLANTAR FASCIITIS: ICD-10-CM

## 2020-04-13 DIAGNOSIS — G57.51 TARSAL TUNNEL SYNDROME OF RIGHT SIDE: ICD-10-CM

## 2020-04-13 PROCEDURE — 99999 PR PBB SHADOW E&M-EST. PATIENT-LVL III: CPT | Mod: PBBFAC,,, | Performed by: PODIATRIST

## 2020-04-13 PROCEDURE — 99999 PR PBB SHADOW E&M-EST. PATIENT-LVL III: ICD-10-PCS | Mod: PBBFAC,,, | Performed by: PODIATRIST

## 2020-04-13 PROCEDURE — 99213 OFFICE O/P EST LOW 20 MIN: CPT | Mod: S$PBB,,, | Performed by: PODIATRIST

## 2020-04-13 PROCEDURE — 99213 PR OFFICE/OUTPT VISIT, EST, LEVL III, 20-29 MIN: ICD-10-PCS | Mod: S$PBB,,, | Performed by: PODIATRIST

## 2020-04-13 PROCEDURE — 99213 OFFICE O/P EST LOW 20 MIN: CPT | Mod: PBBFAC | Performed by: PODIATRIST

## 2020-04-13 RX ORDER — OXYCODONE AND ACETAMINOPHEN 10; 325 MG/1; MG/1
1 TABLET ORAL 4 TIMES DAILY
Qty: 120 TABLET | Refills: 0 | Status: SHIPPED | OUTPATIENT
Start: 2020-04-13 | End: 2020-05-14 | Stop reason: SDUPTHER

## 2020-04-13 RX ORDER — DICLOFENAC SODIUM 75 MG/1
75 TABLET, DELAYED RELEASE ORAL 2 TIMES DAILY
COMMUNITY
End: 2021-03-24

## 2020-04-13 RX ORDER — DICLOFENAC SODIUM 10 MG/G
GEL TOPICAL
COMMUNITY
Start: 2020-04-03 | End: 2021-03-24

## 2020-04-13 NOTE — LETTER
April 14, 2020      [facility]  [address]  [place] [address]           [facility]  [address] [facility] [place] [address]  Phone: [phone]  Fax: [phone]          Patient: Sg Sesay   MR Number: [id number]   YOB: 1975   Date of Visit: 4/13/2020       Dear [facility]:    Thank you for referring Sg Sesay to me for evaluation. Attached you will find relevant portions of my assessment and plan of care.    If you have questions, please do not hesitate to call me. I look forward to following Sg Sesay along with you.    Sincerely,    Jah Torres, LISANDRO    Enclosure  CC:  No Recipients    If you would like to receive this communication electronically, please contact [email or web address] or [phone] to request more information on [employer] Link access.    For providers and/or their staff who would like to refer a patient to [facility], please contact us through our one-stop-shop provider referral line, [facility], at [phone].    If you feel you have received this communication in error or would no longer like to receive these types of communications, please e-mail [email or web address]          mother, daughter/spouse

## 2020-04-14 NOTE — PROGRESS NOTES
Subjective:       Patient ID: Sg Sesay is a 44 y.o. male.    Chief Complaint: Follow-up; Foot Pain; and Foot Problem  Patient presents for followed up of continued right foot pain.         Follow-up   Associated symptoms include joint swelling.   Foot Pain   Associated symptoms include joint swelling.   Ankle Pain         Review of Systems   Musculoskeletal: Positive for gait problem and joint swelling.   All other systems reviewed and are negative.      Objective:      Physical Exam   Constitutional: He appears well-developed and well-nourished.   Cardiovascular:   Pulses:       Dorsalis pedis pulses are 2+ on the right side, and 2+ on the left side.        Posterior tibial pulses are 2+ on the right side, and 2+ on the left side.   Pulmonary/Chest: Effort normal.   Musculoskeletal: He exhibits edema, tenderness and deformity.        Right foot: There is decreased range of motion and deformity.   Feet:   Right Foot:   Protective Sensation: 4 sites tested. 4 sites sensed.   Left Foot:   Protective Sensation: 4 sites tested. 4 sites sensed.   Neurological: He is alert.   Skin: Skin is warm. Capillary refill takes less than 2 seconds.   Psychiatric: He has a normal mood and affect. His behavior is normal. Judgment and thought content normal.   Nursing note and vitals reviewed.      Assessment:       1. Cuboid syndrome of right foot    2. Primary osteoarthritis of right foot    3. Plantar fasciitis - Left Foot    4. Chronic pain in right foot    5. Neuritis    6. MS (multiple sclerosis)    7. Tarsal tunnel syndrome of right side        Plan:          Patient presents today for follow-up of bilateral foot pain he relates the plantar fasciitis in the left foot is getting progressively worse he still having pain at the plantar fascial area of the right but now he is having even more pain at the base of the 5th metatarsal and the cuboid bone this raises concern for cuboid syndrome.    Patient indicated today the  cuboid area on his right foot is not really his primary area of discomfort the greatest area that he is having discomfort is along the course of the Phan's nerve and tarsal tunnel left he states he feels as if this was not causing him any discomfort he could handle the pain on the outside of the right foot patient had question me possibly doing a nerve ablation on the left foot also I have advised him we can only do 1 foot at a time and certainly the right foot is more problematic I have made him aware that I would recommend the nerve ablation on the left foot but a plantar fascial release with respect shin resection of the heel spur would likely eliminate the pain he is having in the left foot patient was in understanding and agreement we have discussed at length and I provided surgical consultation with the patient regarding a nerve ablation in the tarsal tunnel area right.   Patient's  was evaluated prior to prescribing his pain medication as he is under my care for pain management regarding severe bilateral foot pain right greater than left.  Patient continues to have findings consistent with cuboid syndrome right I did discuss fusion of the calcaneal cuboid joint. Follow-up will be 1 month unless the patient has any problems questions or concerns prior to this. Follow-up 1 month.  Patient will likely need further surgical intervention right foot.     Patient understands he is going to likely need surgery for the cuboid syndrome with a calcaneal cuboid fusion right and nerve ablation right.  Patient states right now he knows he needs surgery on his right foot but he is going to put this on hold it got has a lot of things going on in his life I told him I certainly understand this.  Patient indicated basically without the pain medication and Lyrica that I give him for pain management of both lower extremities he would not be able to walk at all.   Patient's left foot is pretty much unchanged he understands  he knows he needs surgery on the right foot but he has addressing these other issues in relationship to his MS.   Patient states he is planning on possibly pursuing surgery in the future.  Patient indicated that he does have bulging discs at L5 and S1 he states he     Patient believes his right foot is getting worse and his left foot is starting to get close to the level of his right foot.  Patient is having increased back pain.  I did discuss an ablation procedure of the patient's nerve pain on the right foot as well as a calcaneal cuboid fusion as previously discussed.  Patient's p.m. P was evaluated pain contract is up-to-date.  Patient's injuries can likely be traced back to his  service.   Patient states he wants to determine what's going on with his back in the next course for treatment of his back before pursuing the next step surgical procedure on the right foot.    Plan follow-up will be 1 month.  Patient is currently under my care for pain management his p.m. P was evaluated he has a current up-to-date pain management contract and drug screen.Patient relates that he has been spur experiencing increasing pain and discomfort in his left knee he states he gave out on him he relates no trauma or injury to the area he went to urgent care and states that they had ordered MRI however has not had that done yet patient believes left knee has been bothering him more because he is favoring his left side due to the right foot pain.  Patient is not currently taking his Lyrica due to cost he states he is going to try to get it filled however.  This note was created using [employer] voice recognition software that occasionally misinterpreted phrases or words.

## 2020-05-13 ENCOUNTER — OFFICE VISIT (OUTPATIENT)
Dept: PODIATRY | Facility: CLINIC | Age: 45
End: 2020-05-13
Payer: MEDICARE

## 2020-05-13 VITALS
TEMPERATURE: 98 F | OXYGEN SATURATION: 96 % | DIASTOLIC BLOOD PRESSURE: 78 MMHG | BODY MASS INDEX: 27.09 KG/M2 | RESPIRATION RATE: 18 BRPM | WEIGHT: 200 LBS | HEIGHT: 72 IN | HEART RATE: 72 BPM | SYSTOLIC BLOOD PRESSURE: 114 MMHG

## 2020-05-13 DIAGNOSIS — M19.071 PRIMARY OSTEOARTHRITIS OF RIGHT FOOT: ICD-10-CM

## 2020-05-13 DIAGNOSIS — G35 MS (MULTIPLE SCLEROSIS): ICD-10-CM

## 2020-05-13 DIAGNOSIS — M72.2 PLANTAR FASCIITIS: ICD-10-CM

## 2020-05-13 DIAGNOSIS — G57.51 TARSAL TUNNEL SYNDROME OF RIGHT SIDE: ICD-10-CM

## 2020-05-13 DIAGNOSIS — M79.2 NEURITIS: ICD-10-CM

## 2020-05-13 DIAGNOSIS — G89.29 CHRONIC PAIN IN RIGHT FOOT: ICD-10-CM

## 2020-05-13 DIAGNOSIS — S93.311A CUBOID SYNDROME OF RIGHT FOOT: Primary | ICD-10-CM

## 2020-05-13 DIAGNOSIS — M79.671 CHRONIC PAIN IN RIGHT FOOT: ICD-10-CM

## 2020-05-13 PROCEDURE — 99999 PR PBB SHADOW E&M-EST. PATIENT-LVL III: ICD-10-PCS | Mod: PBBFAC,,, | Performed by: PODIATRIST

## 2020-05-13 PROCEDURE — 99213 OFFICE O/P EST LOW 20 MIN: CPT | Mod: PBBFAC | Performed by: PODIATRIST

## 2020-05-13 PROCEDURE — 99999 PR PBB SHADOW E&M-EST. PATIENT-LVL III: CPT | Mod: PBBFAC,,, | Performed by: PODIATRIST

## 2020-05-13 PROCEDURE — 99213 PR OFFICE/OUTPT VISIT, EST, LEVL III, 20-29 MIN: ICD-10-PCS | Mod: S$PBB,,, | Performed by: PODIATRIST

## 2020-05-13 PROCEDURE — 99213 OFFICE O/P EST LOW 20 MIN: CPT | Mod: S$PBB,,, | Performed by: PODIATRIST

## 2020-05-14 ENCOUNTER — LAB VISIT (OUTPATIENT)
Dept: LAB | Facility: HOSPITAL | Age: 45
End: 2020-05-14
Attending: PODIATRIST
Payer: MEDICARE

## 2020-05-14 DIAGNOSIS — M19.071 PRIMARY OSTEOARTHRITIS OF RIGHT FOOT: ICD-10-CM

## 2020-05-14 DIAGNOSIS — S93.311A CUBOID SYNDROME OF RIGHT FOOT: ICD-10-CM

## 2020-05-14 DIAGNOSIS — G35 MS (MULTIPLE SCLEROSIS): ICD-10-CM

## 2020-05-14 DIAGNOSIS — G89.29 CHRONIC PAIN IN RIGHT FOOT: ICD-10-CM

## 2020-05-14 DIAGNOSIS — G57.51 TARSAL TUNNEL SYNDROME OF RIGHT SIDE: ICD-10-CM

## 2020-05-14 DIAGNOSIS — M79.671 CHRONIC PAIN IN RIGHT FOOT: ICD-10-CM

## 2020-05-14 DIAGNOSIS — M79.2 NEURITIS: ICD-10-CM

## 2020-05-14 DIAGNOSIS — M72.2 PLANTAR FASCIITIS: ICD-10-CM

## 2020-05-14 LAB
AMPHET+METHAMPHET UR QL: NEGATIVE
BARBITURATES UR QL SCN>200 NG/ML: NEGATIVE
BENZODIAZ UR QL SCN>200 NG/ML: NEGATIVE
BZE UR QL SCN: NEGATIVE
CANNABINOIDS UR QL SCN: NEGATIVE
CREAT UR-MCNC: 354 MG/DL (ref 23–375)
OPIATES UR QL SCN: NORMAL
PCP UR QL SCN>25 NG/ML: NEGATIVE
TOXICOLOGY INFORMATION: NORMAL

## 2020-05-14 PROCEDURE — 80307 DRUG TEST PRSMV CHEM ANLYZR: CPT

## 2020-05-14 RX ORDER — OXYCODONE AND ACETAMINOPHEN 10; 325 MG/1; MG/1
1 TABLET ORAL 4 TIMES DAILY
Qty: 120 TABLET | Refills: 0 | Status: SHIPPED | OUTPATIENT
Start: 2020-05-14 | End: 2020-06-10 | Stop reason: SDUPTHER

## 2020-05-15 ENCOUNTER — TELEPHONE (OUTPATIENT)
Dept: PODIATRY | Facility: CLINIC | Age: 45
End: 2020-05-15

## 2020-05-15 NOTE — TELEPHONE ENCOUNTER
----- Message from Jah Torres DPM sent at 5/14/2020  9:34 PM CDT -----  Please call the patient regarding his abnormal result.  Call and advise drug screen fine

## 2020-05-16 NOTE — PROGRESS NOTES
Subjective:       Patient ID: Sg Sesay is a 44 y.o. male.    Chief Complaint: Follow-up  Patient presents for followed up of continued right foot pain.         Follow-up   Associated symptoms include joint swelling.   Foot Pain   Associated symptoms include joint swelling.   Ankle Pain         Review of Systems   Musculoskeletal: Positive for gait problem and joint swelling.   All other systems reviewed and are negative.      Objective:      Physical Exam   Constitutional: He appears well-developed and well-nourished.   Cardiovascular:   Pulses:       Dorsalis pedis pulses are 2+ on the right side, and 2+ on the left side.        Posterior tibial pulses are 2+ on the right side, and 2+ on the left side.   Pulmonary/Chest: Effort normal.   Musculoskeletal: He exhibits edema, tenderness and deformity.        Right foot: There is decreased range of motion and deformity.   Feet:   Right Foot:   Protective Sensation: 4 sites tested. 4 sites sensed.   Left Foot:   Protective Sensation: 4 sites tested. 4 sites sensed.   Neurological: He is alert.   Skin: Skin is warm. Capillary refill takes less than 2 seconds.   Psychiatric: He has a normal mood and affect. His behavior is normal. Judgment and thought content normal.   Nursing note and vitals reviewed.    Drug screen panel, emergency   Order: [id number]   Status:  Final result   Visible to patient:  No (Not Released) Next appt:  06/10/2020 at 09:00 AM in Podiatry (Jah Torres DPM) Dx:  Neuritis; Plantar fasciitis - Left Fo...    Ref Range & Units 2d ago 3mo ago 6mo ago   Benzodiazepines  Negative  Negative  Negative    Cocaine (Metab.)  Negative  Negative  Negative    Opiate Scrn, Ur  Presumptive Positive  Presumptive Positive  Presumptive Positive    Barbiturate Screen, Ur  Negative  Negative  Negative    Amphetamine Screen, Ur  Negative  Negative  Negative    THC  Negative  Negative  Negative    Phencyclidine  Negative  Negative  Negative    Creatinine,  Random Ur 23.0 - 375.0 mg/dL 354.0  291.4 .2 CM   Comment: The random urine reference ranges provided were established   for 24 hour urine collections.  No reference ranges exist for   random urine specimens.  Correlate clinically.    Toxicology Information  SEE COMMENT  SEE COMMENT CM SEE COMMENT CM   Comment: This screen includes the following classes of drugs at the   listed cut-off:   Benzodiazepines                  200 ng/ml   Cocaine metabolite               300 ng/ml   Opiates                         2000 ng/ml   Barbiturates                     200 ng/ml   Amphetamines                    1000 ng/ml   Marijuana metabs (THC)            50 ng/ml   Phencyclidine (PCP)               25 ng/ml   High concentrations of Methylenedioxymethamphetamine (MDMA aka   Ectasy) and other structurally similar compounds may cross-   react with the Amphetamine/Methamphetamine screening   immunoassay giving a false positive result.   Note: This exception list includes only more common   interferants in toxicology screen testing.  Because of many   cross-reactantspositive results on toxicology drug screens   should be confirmed whenever results do not correlate with   clinical presentation.   This report is intended for use in clinical monitoring and   management of patients. It is not intended for use in   employment related drug testing.   Because of any cross-reactants, positive results on toxicology   drug screens should be confirmed whenever results do not   correlate with clinical presentation.   Presumptive positive results are unconfirmed and may be used   only for medical purposes.              Assessment:       1. Cuboid syndrome of right foot    2. Primary osteoarthritis of right foot    3. Chronic pain in right foot    4. Neuritis    5. MS (multiple sclerosis)    6. Tarsal tunnel syndrome of right side    7. Plantar fasciitis - Left Foot        Plan:          Patient presents today for follow-up of bilateral  foot pain he relates the plantar fasciitis in the left foot is getting progressively worse he still having pain at the plantar fascial area of the right but now he is having even more pain at the base of the 5th metatarsal and the cuboid bone this raises concern for cuboid syndrome.    Patient indicated today the cuboid area on his right foot is not really his primary area of discomfort the greatest area that he is having discomfort is along the course of the Phan's nerve and tarsal tunnel left he states he feels as if this was not causing him any discomfort he could handle the pain on the outside of the right foot patient had question me possibly doing a nerve ablation on the left foot also I have advised him we can only do 1 foot at a time and certainly the right foot is more problematic I have made him aware that I would recommend the nerve ablation on the left foot but a plantar fascial release with respect shin resection of the heel spur would likely eliminate the pain he is having in the left foot patient was in understanding and agreement we have discussed at length and I provided surgical consultation with the patient regarding a nerve ablation in the tarsal tunnel area right.   Patient's  was evaluated prior to prescribing his pain medication as he is under my care for pain management regarding severe bilateral foot pain right greater than left.  Patient continues to have findings consistent with cuboid syndrome right I did discuss fusion of the calcaneal cuboid joint. Follow-up will be 1 month unless the patient has any problems questions or concerns prior to this. Follow-up 1 month.  Patient will likely need further surgical intervention right foot.     Patient understands he is going to likely need surgery for the cuboid syndrome with a calcaneal cuboid fusion right and nerve ablation right.  Patient states right now he knows he needs surgery on his right foot but he is going to put this on hold it  got has a lot of things going on in his life I told him I certainly understand this.  Patient indicated basically without the pain medication and Lyrica that I give him for pain management of both lower extremities he would not be able to walk at all.   Patient's left foot is pretty much unchanged he understands he knows he needs surgery on the right foot but he has addressing these other issues in relationship to his MS.   Patient states he is planning on possibly pursuing surgery in the future.  Patient indicated that he does have bulging discs at L5 and S1 he states he     Patient believes his right foot is getting worse and his left foot is starting to get close to the level of his right foot.  Patient is having increased back pain.  I did discuss an ablation procedure of the patient's nerve pain on the right foot as well as a calcaneal cuboid fusion as previously discussed.  Patient's p.m. P was evaluated pain contract is up-to-date.  Patient's injuries can likely be traced back to his  service.   Patient states he wants to determine what's going on with his back in the next course for treatment of his back before pursuing the next step surgical procedure on the right foot.    Plan follow-up will be 1 month.  Patient is currently under my care for pain management his p.m. P was evaluated he has a current up-to-date pain management contract and drug screen.  Patient is currently awaiting the results of the MRI on his left knee.  Drug screen was ordered today.  Patient is not currently taking his Lyrica due to cost he states he is going to try to get it filled however.  This note was created using [email or web address] voice recognition software that occasionally misinterpreted phrases or words.

## 2020-06-10 ENCOUNTER — OFFICE VISIT (OUTPATIENT)
Dept: PODIATRY | Facility: CLINIC | Age: 45
End: 2020-06-10
Payer: MEDICARE

## 2020-06-10 VITALS
TEMPERATURE: 98 F | RESPIRATION RATE: 18 BRPM | BODY MASS INDEX: 27.09 KG/M2 | HEIGHT: 72 IN | DIASTOLIC BLOOD PRESSURE: 74 MMHG | WEIGHT: 200 LBS | OXYGEN SATURATION: 97 % | SYSTOLIC BLOOD PRESSURE: 124 MMHG | HEART RATE: 75 BPM

## 2020-06-10 DIAGNOSIS — G89.29 CHRONIC PAIN OF RIGHT ANKLE: ICD-10-CM

## 2020-06-10 DIAGNOSIS — S93.311A CUBOID SYNDROME OF RIGHT FOOT: Primary | ICD-10-CM

## 2020-06-10 DIAGNOSIS — M79.671 CHRONIC PAIN IN RIGHT FOOT: ICD-10-CM

## 2020-06-10 DIAGNOSIS — M25.571 CHRONIC PAIN OF RIGHT ANKLE: ICD-10-CM

## 2020-06-10 DIAGNOSIS — G35 MS (MULTIPLE SCLEROSIS): ICD-10-CM

## 2020-06-10 DIAGNOSIS — M72.2 PLANTAR FASCIITIS: ICD-10-CM

## 2020-06-10 DIAGNOSIS — G57.51 TARSAL TUNNEL SYNDROME OF RIGHT SIDE: ICD-10-CM

## 2020-06-10 DIAGNOSIS — G89.29 CHRONIC PAIN IN RIGHT FOOT: ICD-10-CM

## 2020-06-10 DIAGNOSIS — M79.2 NEURITIS: ICD-10-CM

## 2020-06-10 DIAGNOSIS — M19.071 PRIMARY OSTEOARTHRITIS OF RIGHT FOOT: ICD-10-CM

## 2020-06-10 PROCEDURE — 99213 OFFICE O/P EST LOW 20 MIN: CPT | Mod: PBBFAC | Performed by: PODIATRIST

## 2020-06-10 PROCEDURE — 99213 PR OFFICE/OUTPT VISIT, EST, LEVL III, 20-29 MIN: ICD-10-PCS | Mod: S$PBB,,, | Performed by: PODIATRIST

## 2020-06-10 PROCEDURE — 99999 PR PBB SHADOW E&M-EST. PATIENT-LVL III: ICD-10-PCS | Mod: PBBFAC,,, | Performed by: PODIATRIST

## 2020-06-10 PROCEDURE — 99999 PR PBB SHADOW E&M-EST. PATIENT-LVL III: CPT | Mod: PBBFAC,,, | Performed by: PODIATRIST

## 2020-06-10 PROCEDURE — 99213 OFFICE O/P EST LOW 20 MIN: CPT | Mod: S$PBB,,, | Performed by: PODIATRIST

## 2020-06-10 RX ORDER — OXYCODONE AND ACETAMINOPHEN 10; 325 MG/1; MG/1
1 TABLET ORAL 4 TIMES DAILY
Qty: 120 TABLET | Refills: 0 | Status: SHIPPED | OUTPATIENT
Start: 2020-06-10 | End: 2020-07-10

## 2020-06-14 NOTE — PROGRESS NOTES
Subjective:       Patient ID: Sg Sesay is a 44 y.o. male.    Chief Complaint: Follow-up  Patient presents for followed up of continued right foot pain.         Follow-up  Associated symptoms include joint swelling.   Foot Pain  Associated symptoms include joint swelling.   Ankle Pain        Review of Systems   Musculoskeletal: Positive for gait problem and joint swelling.   All other systems reviewed and are negative.      Objective:      Physical Exam  Vitals signs and nursing note reviewed.   Constitutional:       Appearance: He is well-developed.   Cardiovascular:      Pulses:           Dorsalis pedis pulses are 2+ on the right side and 2+ on the left side.        Posterior tibial pulses are 2+ on the right side and 2+ on the left side.   Pulmonary:      Effort: Pulmonary effort is normal.   Musculoskeletal:         General: Tenderness and deformity present.      Right foot: Decreased range of motion. Deformity present.   Feet:      Right foot:      Protective Sensation: 4 sites tested. 4 sites sensed.      Left foot:      Protective Sensation: 4 sites tested. 4 sites sensed.   Skin:     General: Skin is warm.      Capillary Refill: Capillary refill takes less than 2 seconds.   Neurological:      Mental Status: He is alert.   Psychiatric:         Behavior: Behavior normal.         Thought Content: Thought content normal.         Judgment: Judgment normal.            Assessment:       1. Cuboid syndrome of right foot    2. Chronic pain of right ankle    3. Chronic pain in right foot    4. Primary osteoarthritis of right foot    5. Plantar fasciitis - Left Foot    6. Neuritis    7. Tarsal tunnel syndrome of right side    8. MS (multiple sclerosis)        Plan:          Patient presents today for follow-up of bilateral foot pain he relates the plantar fasciitis in the left foot is getting progressively worse he still having pain at the plantar fascial area of the right but now he is having even more pain at  the base of the 5th metatarsal and the cuboid bone this raises concern for cuboid syndrome.    Patient indicated today the cuboid area on his right foot is not really his primary area of discomfort the greatest area that he is having discomfort is along the course of the Phan's nerve and tarsal tunnel left he states he feels as if this was not causing him any discomfort he could handle the pain on the outside of the right foot patient had question me possibly doing a nerve ablation on the left foot also I have advised him we can only do 1 foot at a time and certainly the right foot is more problematic I have made him aware that I would recommend the nerve ablation on the left foot but a plantar fascial release with respect shin resection of the heel spur would likely eliminate the pain he is having in the left foot patient was in understanding and agreement we have discussed at length and I provided surgical consultation with the patient regarding a nerve ablation in the tarsal tunnel area right.   Patient's  was evaluated prior to prescribing his pain medication as he is under my care for pain management regarding severe bilateral foot pain right greater than left.  Patient continues to have findings consistent with cuboid syndrome right I did discuss fusion of the calcaneal cuboid joint. Follow-up will be 1 month unless the patient has any problems questions or concerns prior to this. Follow-up 1 month.  Patient will likely need further surgical intervention right foot.     Patient understands he is going to likely need surgery for the cuboid syndrome with a calcaneal cuboid fusion right and nerve ablation right.  Patient states right now he knows he needs surgery on his right foot but he is going to put this on hold it got has a lot of things going on in his life I told him I certainly understand this.  Patient indicated basically without the pain medication and Lyrica that I give him for pain management of  both lower extremities he would not be able to walk at all.   Patient's left foot is pretty much unchanged he understands he knows he needs surgery on the right foot but he has addressing these other issues in relationship to his MS.   Patient states he is planning on possibly pursuing surgery in the future.  Patient indicated that he does have bulging discs at L5 and S1 he states he     Patient believes his right foot is getting worse and his left foot is starting to get close to the level of his right foot.  Patient is having increased back pain.  I did discuss an ablation procedure of the patient's nerve pain on the right foot as well as a calcaneal cuboid fusion as previously discussed.  Patient's p.m. P was evaluated pain contract is up-to-date.  Patient's injuries can likely be traced back to his  service.   Patient states he wants to determine what's going on with his back in the next course for treatment of his back before pursuing the next step surgical procedure on the right foot.    Plan follow-up will be 1 month.  Patient is currently under my care for pain management his p.m. P was evaluated he has a current up-to-date pain management contract and drug screen.  Patient indicated that his neurologist had ordered an MRI of his head because he is claustrophobic he was not able to complete the study and needs to have it reordered and be premedicated before the study.  Patient states he is not able to ambulate at all without the use of his custom-molded orthotics.  This note was created using [employer] voice recognition software that occasionally misinterpreted phrases or words.

## 2020-06-18 ENCOUNTER — TELEPHONE (OUTPATIENT)
Dept: PODIATRY | Facility: CLINIC | Age: 45
End: 2020-06-18

## 2020-06-18 NOTE — TELEPHONE ENCOUNTER
----- Message from Consuelo Jasso sent at 6/18/2020 11:02 AM CDT -----  Type:  RX Refill Request    Who Called:  Patient  Refill or New Rx:  Refill  RX Name and Strength:  LYRICA 150 mg capsule  How is the patient currently taking it? (ex. 1XDay):  3xday  Is this a 30 day or 90 day RX:  90 pills  Preferred Pharmacy with phone number:    [id number] [place] [id number] AT [facility] [address]  [address] [place] [id number]  Phone: [phone] Fax: [phone]     Local or Mail Order:  local  Ordering Provider:  same  Best Call Back Number:  [phone] (home)     Additional Information:  Please call to advise when completed.

## 2020-06-18 NOTE — TELEPHONE ENCOUNTER
----- Message from Consuelo Jasso sent at 6/18/2020 11:02 AM CDT -----  Type:  RX Refill Request    Who Called:  Patient  Refill or New Rx:  Refill  RX Name and Strength:  LYRICA 150 mg capsule  How is the patient currently taking it? (ex. 1XDay):  3xday  Is this a 30 day or 90 day RX:  90 pills  Preferred Pharmacy with phone number:    [id number] [place] [id number] AT [facility] [address]  [address] [place] [id number]  Phone: [phone] Fax: [phone]     Local or Mail Order:  local  Ordering Provider:  same  Best Call Back Number:  [phone] (home)     Additional Information:  Please call to advise when completed.

## 2020-06-18 NOTE — TELEPHONE ENCOUNTER
Spoke with pt he stated he thinks  he has more refills so will check with pharmacy first and if any issue with filling will call office back. Thinks pharmacy may be just running behind.

## 2020-08-03 ENCOUNTER — TELEPHONE (OUTPATIENT)
Dept: PODIATRY | Facility: CLINIC | Age: 45
End: 2020-08-03

## 2020-08-03 ENCOUNTER — LAB VISIT (OUTPATIENT)
Dept: LAB | Facility: HOSPITAL | Age: 45
End: 2020-08-03
Attending: PODIATRIST
Payer: MEDICARE

## 2020-08-03 ENCOUNTER — OFFICE VISIT (OUTPATIENT)
Dept: PODIATRY | Facility: CLINIC | Age: 45
End: 2020-08-03
Payer: MEDICARE

## 2020-08-03 VITALS
OXYGEN SATURATION: 99 % | WEIGHT: 195 LBS | HEART RATE: 85 BPM | DIASTOLIC BLOOD PRESSURE: 77 MMHG | BODY MASS INDEX: 26.41 KG/M2 | TEMPERATURE: 98 F | HEIGHT: 72 IN | RESPIRATION RATE: 19 BRPM | SYSTOLIC BLOOD PRESSURE: 134 MMHG

## 2020-08-03 DIAGNOSIS — M79.2 NEURITIS: ICD-10-CM

## 2020-08-03 DIAGNOSIS — S93.311A CUBOID SYNDROME OF RIGHT FOOT: Primary | ICD-10-CM

## 2020-08-03 DIAGNOSIS — G35 MS (MULTIPLE SCLEROSIS): ICD-10-CM

## 2020-08-03 LAB
AMPHET+METHAMPHET UR QL: NEGATIVE
BARBITURATES UR QL SCN>200 NG/ML: NEGATIVE
BENZODIAZ UR QL SCN>200 NG/ML: NEGATIVE
BZE UR QL SCN: NEGATIVE
CANNABINOIDS UR QL SCN: NEGATIVE
CREAT UR-MCNC: >400 MG/DL (ref 23–375)
OPIATES UR QL SCN: ABNORMAL
PCP UR QL SCN>25 NG/ML: NEGATIVE
TOXICOLOGY INFORMATION: ABNORMAL

## 2020-08-03 PROCEDURE — 99999 PR PBB SHADOW E&M-EST. PATIENT-LVL III: ICD-10-PCS | Mod: PBBFAC,,, | Performed by: PODIATRIST

## 2020-08-03 PROCEDURE — 80307 DRUG TEST PRSMV CHEM ANLYZR: CPT

## 2020-08-03 PROCEDURE — 99999 PR PBB SHADOW E&M-EST. PATIENT-LVL III: CPT | Mod: PBBFAC,,, | Performed by: PODIATRIST

## 2020-08-03 PROCEDURE — 99213 PR OFFICE/OUTPT VISIT, EST, LEVL III, 20-29 MIN: ICD-10-PCS | Mod: S$PBB,,, | Performed by: PODIATRIST

## 2020-08-03 PROCEDURE — 99213 OFFICE O/P EST LOW 20 MIN: CPT | Mod: S$PBB,,, | Performed by: PODIATRIST

## 2020-08-03 PROCEDURE — 99213 OFFICE O/P EST LOW 20 MIN: CPT | Mod: PBBFAC | Performed by: PODIATRIST

## 2020-08-03 RX ORDER — LORAZEPAM 0.5 MG/1
TABLET ORAL
COMMUNITY
Start: 2020-06-12 | End: 2020-08-03

## 2020-08-03 RX ORDER — OXYCODONE AND ACETAMINOPHEN 10; 325 MG/1; MG/1
1 TABLET ORAL 4 TIMES DAILY
Qty: 120 TABLET | Refills: 0 | Status: SHIPPED | OUTPATIENT
Start: 2020-08-03 | End: 2020-09-02 | Stop reason: SDUPTHER

## 2020-08-03 NOTE — TELEPHONE ENCOUNTER
----- Message from Jah Torres DPM sent at 8/3/2020 12:50 PM CDT -----  Please call the patient regarding his abnormal result.  Call and advise drug screen is fine prescription has been sent.

## 2020-08-03 NOTE — PROGRESS NOTES
Subjective:       Patient ID: Sg Sesay is a 44 y.o. male.    Chief Complaint: Follow-up  Patient presents for followed up of continued right foot pain.   Patient states his right foot pain has gotten progressively worse he states he is thinking about having the surgery that I have recommended next month.      Follow-up   Associated symptoms include joint swelling.   Foot Pain   Associated symptoms include joint swelling.   Ankle Pain         Review of Systems   Musculoskeletal: Positive for gait problem and joint swelling.   All other systems reviewed and are negative.      Objective:      Physical Exam   Constitutional: He appears well-developed and well-nourished.   Cardiovascular:   Pulses:       Dorsalis pedis pulses are 2+ on the right side, and 2+ on the left side.        Posterior tibial pulses are 2+ on the right side, and 2+ on the left side.   Pulmonary/Chest: Effort normal.   Musculoskeletal: He exhibits edema, tenderness and deformity.        Right foot: There is decreased range of motion and deformity.   Feet:   Right Foot:   Protective Sensation: 4 sites tested. 4 sites sensed.   Left Foot:   Protective Sensation: 4 sites tested. 4 sites sensed.   Neurological: He is alert.   Skin: Skin is warm. Capillary refill takes less than 2 seconds.   Psychiatric: He has a normal mood and affect. His behavior is normal. Judgment and thought content normal.   Nursing note and vitals reviewed.          Assessment:       1. Cuboid syndrome of right foot    2. Neuritis    3. MS (multiple sclerosis)        Plan:          Patient presents today for follow-up of bilateral foot pain he relates the plantar fasciitis in the left foot is getting progressively worse he still having pain at the plantar fascial area of the right but now he is having even more pain at the base of the 5th metatarsal and the cuboid bone this raises concern for cuboid syndrome.    Patient indicated today the cuboid area on his right foot  is not really his primary area of discomfort the greatest area that he is having discomfort is along the course of the Phan's nerve and tarsal tunnel left he states he feels as if this was not causing him any discomfort he could handle the pain on the outside of the right foot patient had question me possibly doing a nerve ablation on the left foot also I have advised him we can only do 1 foot at a time and certainly the right foot is more problematic I have made him aware that I would recommend the nerve ablation on the left foot but a plantar fascial release with respect shin resection of the heel spur would likely eliminate the pain he is having in the left foot patient was in understanding and agreement we have discussed at length and I provided surgical consultation with the patient regarding a nerve ablation in the tarsal tunnel area right.   Patient's  was evaluated prior to prescribing his pain medication as he is under my care for pain management regarding severe bilateral foot pain right greater than left.  Patient continues to have findings consistent with cuboid syndrome right I did discuss fusion of the calcaneal cuboid joint. Follow-up will be 1 month unless the patient has any problems questions or concerns prior to this. Follow-up 1 month.  Patient will likely need further surgical intervention right foot.     Patient understands he is going to likely need surgery for the cuboid syndrome with a calcaneal cuboid fusion right and nerve ablation right.  Patient states right now he knows he needs surgery on his right foot but he is going to put this on hold it got has a lot of things going on in his life I told him I certainly understand this.  Patient indicated basically without the pain medication and Lyrica that I give him for pain management of both lower extremities he would not be able to walk at all.   Patient's left foot is pretty much unchanged he understands he knows he needs surgery on  the right foot but he has addressing these other issues in relationship to his MS.   Patient states he is planning on possibly pursuing surgery in the future.  Patient indicated that he does have bulging discs at L5 and S1 he states he     Patient believes his right foot is getting worse and his left foot is starting to get close to the level of his right foot.  Patient is having increased back pain.  I did discuss an ablation procedure of the patient's nerve pain on the right foot as well as a calcaneal cuboid fusion as previously discussed.  Patient's p.m. P was evaluated new pain contract executed today.  Patient's injuries can likely be traced back to his  service.   Patient states he wants to determine what's going on with his back in the next course for treatment of his back before pursuing the next step surgical procedure on the right foot.    Plan follow-up will be 1 month.  Drug screen was ordered today and is satisfactory prescription was sent.  Patient is continuing to have problems with a meniscal tear to his left knee.   This note was created using [employer] voice recognition software that occasionally misinterpreted phrases or words.

## 2020-09-02 ENCOUNTER — HOSPITAL ENCOUNTER (OUTPATIENT)
Dept: RADIOLOGY | Facility: HOSPITAL | Age: 45
Discharge: HOME | End: 2020-09-02
Attending: PODIATRIST
Payer: MEDICARE

## 2020-09-02 ENCOUNTER — OFFICE VISIT (OUTPATIENT)
Dept: PODIATRY | Facility: CLINIC | Age: 45
End: 2020-09-02
Payer: MEDICARE

## 2020-09-02 VITALS
HEART RATE: 74 BPM | OXYGEN SATURATION: 98 % | WEIGHT: 195 LBS | HEIGHT: 72 IN | BODY MASS INDEX: 26.41 KG/M2 | RESPIRATION RATE: 19 BRPM | TEMPERATURE: 98 F | DIASTOLIC BLOOD PRESSURE: 74 MMHG | SYSTOLIC BLOOD PRESSURE: 141 MMHG

## 2020-09-02 DIAGNOSIS — M79.2 NEURITIS: ICD-10-CM

## 2020-09-02 DIAGNOSIS — M76.61 ACHILLES TENDINITIS OF RIGHT LOWER EXTREMITY: ICD-10-CM

## 2020-09-02 DIAGNOSIS — G35 MS (MULTIPLE SCLEROSIS): ICD-10-CM

## 2020-09-02 DIAGNOSIS — M19.071 PRIMARY OSTEOARTHRITIS OF RIGHT FOOT: Primary | ICD-10-CM

## 2020-09-02 DIAGNOSIS — G89.29 CHRONIC PAIN OF RIGHT ANKLE: ICD-10-CM

## 2020-09-02 DIAGNOSIS — M25.571 CHRONIC PAIN OF RIGHT ANKLE: ICD-10-CM

## 2020-09-02 DIAGNOSIS — S93.311A CUBOID SYNDROME OF RIGHT FOOT: ICD-10-CM

## 2020-09-02 PROCEDURE — 99999 PR PBB SHADOW E&M-EST. PATIENT-LVL III: CPT | Mod: PBBFAC,,, | Performed by: PODIATRIST

## 2020-09-02 PROCEDURE — 73630 X-RAY EXAM OF FOOT: CPT | Mod: 26,RT,, | Performed by: RADIOLOGY

## 2020-09-02 PROCEDURE — 99213 PR OFFICE/OUTPT VISIT, EST, LEVL III, 20-29 MIN: ICD-10-PCS | Mod: S$PBB,,, | Performed by: PODIATRIST

## 2020-09-02 PROCEDURE — 73630 X-RAY EXAM OF FOOT: CPT | Mod: TC,FY,RT

## 2020-09-02 PROCEDURE — 99213 OFFICE O/P EST LOW 20 MIN: CPT | Mod: S$PBB,,, | Performed by: PODIATRIST

## 2020-09-02 PROCEDURE — 99999 PR PBB SHADOW E&M-EST. PATIENT-LVL III: ICD-10-PCS | Mod: PBBFAC,,, | Performed by: PODIATRIST

## 2020-09-02 PROCEDURE — 73630 XR FOOT COMPLETE 3 VIEW RIGHT: ICD-10-PCS | Mod: 26,RT,, | Performed by: RADIOLOGY

## 2020-09-02 PROCEDURE — 99213 OFFICE O/P EST LOW 20 MIN: CPT | Mod: PBBFAC,25 | Performed by: PODIATRIST

## 2020-09-02 RX ORDER — OXYCODONE AND ACETAMINOPHEN 10; 325 MG/1; MG/1
1 TABLET ORAL 4 TIMES DAILY
Qty: 120 TABLET | Refills: 0 | Status: SHIPPED | OUTPATIENT
Start: 2020-09-02 | End: 2020-10-02

## 2020-09-06 NOTE — PROGRESS NOTES
Subjective:       Patient ID: Sg Sesay is a 44 y.o. male.    Chief Complaint: Follow-up  Patient presents for followed up of continued right foot pain.   Patient states his right foot pain has gotten progressively worse he states he is thinking about having the surgery that I have recommended next month.      Follow-up   Associated symptoms include joint swelling.   Foot Pain   Associated symptoms include joint swelling.   Ankle Pain         Review of Systems   Musculoskeletal: Positive for gait problem and joint swelling.   All other systems reviewed and are negative.      Objective:      Physical Exam   Constitutional: He appears well-developed and well-nourished.   Cardiovascular:   Pulses:       Dorsalis pedis pulses are 2+ on the right side, and 2+ on the left side.        Posterior tibial pulses are 2+ on the right side, and 2+ on the left side.   Pulmonary/Chest: Effort normal.   Musculoskeletal: He exhibits edema, tenderness and deformity.        Right foot: There is decreased range of motion and deformity.   Feet:   Right Foot:   Protective Sensation: 4 sites tested. 4 sites sensed.   Left Foot:   Protective Sensation: 4 sites tested. 4 sites sensed.   Neurological: He is alert.   Skin: Skin is warm. Capillary refill takes less than 2 seconds.   Psychiatric: He has a normal mood and affect. His behavior is normal. Judgment and thought content normal.   Nursing note and vitals reviewed.          Assessment:       1. Primary osteoarthritis of right foot    2. Cuboid syndrome of right foot    3. Chronic pain of right ankle    4. Neuritis    5. MS (multiple sclerosis)    6. Achilles tendinitis of right lower extremity        Plan:          Patient presents today for follow-up of bilateral foot pain he relates the plantar fasciitis in the left foot is getting progressively worse he still having pain at the plantar fascial area of the right but now he is having even more pain at the base of the 5th  metatarsal and the cuboid bone this raises concern for cuboid syndrome.    Patient indicated today the cuboid area on his right foot is not really his primary area of discomfort the greatest area that he is having discomfort is along the course of the Phan's nerve and tarsal tunnel left he states he feels as if this was not causing him any discomfort he could handle the pain on the outside of the right foot patient had question me possibly doing a nerve ablation on the left foot also I have advised him we can only do 1 foot at a time and certainly the right foot is more problematic I have made him aware that I would recommend the nerve ablation on the left foot but a plantar fascial release with respect shin resection of the heel spur would likely eliminate the pain he is having in the left foot patient was in understanding and agreement we have discussed at length and I provided surgical consultation with the patient regarding a nerve ablation in the tarsal tunnel area right.   Patient's  was evaluated prior to prescribing his pain medication as he is under my care for pain management regarding severe bilateral foot pain right greater than left.  Patient continues to have findings consistent with cuboid syndrome right I did discuss fusion of the calcaneal cuboid joint. Follow-up will be 1 month unless the patient has any problems questions or concerns prior to this. Follow-up 1 month.  Patient will likely need further surgical intervention right foot.     Patient understands he is going to likely need surgery for the cuboid syndrome with a calcaneal cuboid fusion right and nerve ablation right.  Patient states right now he knows he needs surgery on his right foot but he is going to put this on hold it got has a lot of things going on in his life I told him I certainly understand this.  Patient indicated basically without the pain medication and Lyrica that I give him for pain management of both lower  extremities he would not be able to walk at all.   Patient's left foot is pretty much unchanged he understands he knows he needs surgery on the right foot but he has addressing these other issues in relationship to his MS.   Patient states he is planning on possibly pursuing surgery in the future.  Patient indicated that he does have bulging discs at L5 and S1 he states he     Patient believes his right foot is getting worse and his left foot is starting to get close to the level of his right foot.  Patient is having increased back pain.  I did discuss an ablation procedure of the patient's nerve pain on the right foot as well as a calcaneal cuboid fusion as previously discussed.  Patient's p.m. P was evaluated new pain contract executed today.  Patient's injuries can likely be traced back to his  service.   Patient states he wants to determine what's going on with his back in the next course for treatment of his back before pursuing the next step surgical procedure on the right foot.  Patient has been having some Achilles tendon pain right we did take x-rays of the right foot and I have advised the patient I do believe the Achilles tendinitis is directly related to his compensation the way he is walking on the right foot.  I also reviewed the calcaneal cuboid joint in detail with the patient advising him what would need to be done to fuse the joint.  The area of previous subtalar joint fusion looks very good and completely well fused.  Patient is still waiting see what the next step will be regarding his back he states his back issues are day to day.  Plan follow-up will be 1 month.

## 2020-10-07 ENCOUNTER — OFFICE VISIT (OUTPATIENT)
Dept: PODIATRY | Facility: CLINIC | Age: 45
End: 2020-10-07
Payer: MEDICARE

## 2020-10-07 VITALS
HEART RATE: 88 BPM | TEMPERATURE: 98 F | WEIGHT: 202 LBS | HEIGHT: 72 IN | SYSTOLIC BLOOD PRESSURE: 130 MMHG | DIASTOLIC BLOOD PRESSURE: 88 MMHG | BODY MASS INDEX: 27.36 KG/M2

## 2020-10-07 DIAGNOSIS — S93.311A CUBOID SYNDROME OF RIGHT FOOT: Primary | ICD-10-CM

## 2020-10-07 DIAGNOSIS — M72.2 PLANTAR FASCIITIS: ICD-10-CM

## 2020-10-07 DIAGNOSIS — G57.51 TARSAL TUNNEL SYNDROME OF RIGHT SIDE: ICD-10-CM

## 2020-10-07 DIAGNOSIS — G35 MS (MULTIPLE SCLEROSIS): ICD-10-CM

## 2020-10-07 DIAGNOSIS — M79.2 NEURITIS: ICD-10-CM

## 2020-10-07 PROCEDURE — 99999 PR PBB SHADOW E&M-EST. PATIENT-LVL III: CPT | Mod: PBBFAC,,, | Performed by: PODIATRIST

## 2020-10-07 PROCEDURE — 99999 PR PBB SHADOW E&M-EST. PATIENT-LVL III: ICD-10-PCS | Mod: PBBFAC,,, | Performed by: PODIATRIST

## 2020-10-07 PROCEDURE — 99213 PR OFFICE/OUTPT VISIT, EST, LEVL III, 20-29 MIN: ICD-10-PCS | Mod: S$PBB,,, | Performed by: PODIATRIST

## 2020-10-07 PROCEDURE — 99213 OFFICE O/P EST LOW 20 MIN: CPT | Mod: S$PBB,,, | Performed by: PODIATRIST

## 2020-10-07 PROCEDURE — 99213 OFFICE O/P EST LOW 20 MIN: CPT | Mod: PBBFAC | Performed by: PODIATRIST

## 2020-10-07 RX ORDER — GEMFIBROZIL 600 MG/1
TABLET, FILM COATED ORAL
COMMUNITY
Start: 2020-08-25 | End: 2022-09-13

## 2020-10-07 RX ORDER — OXYCODONE AND ACETAMINOPHEN 10; 325 MG/1; MG/1
1 TABLET ORAL 4 TIMES DAILY
Qty: 120 TABLET | Refills: 0 | Status: SHIPPED | OUTPATIENT
Start: 2020-10-07 | End: 2020-11-06

## 2020-10-07 RX ORDER — DIMETHYL FUMARATE 240 MG/1
CAPSULE ORAL
COMMUNITY
Start: 2020-04-08 | End: 2021-07-21

## 2020-10-07 RX ORDER — DOXEPIN HYDROCHLORIDE 50 MG/1
CAPSULE ORAL
COMMUNITY
Start: 2020-04-06 | End: 2021-07-08

## 2020-10-07 RX ORDER — BUSPIRONE HYDROCHLORIDE 5 MG/1
TABLET ORAL
COMMUNITY
Start: 2020-07-10 | End: 2021-01-04

## 2020-10-07 RX ORDER — OXYCODONE AND ACETAMINOPHEN 10; 325 MG/1; MG/1
TABLET ORAL
COMMUNITY
Start: 2017-01-18 | End: 2020-11-09 | Stop reason: SDUPTHER

## 2020-10-07 RX ORDER — MODAFINIL 100 MG/1
TABLET ORAL
COMMUNITY
Start: 2020-03-09 | End: 2021-09-23

## 2020-10-07 RX ORDER — PREGABALIN 150 MG/1
CAPSULE ORAL
COMMUNITY
Start: 2020-04-08 | End: 2021-02-04 | Stop reason: SDUPTHER

## 2020-10-09 NOTE — PROGRESS NOTES
Subjective:       Patient ID: Sg Sesay is a 44 y.o. male.    Chief Complaint: Follow-up, Foot Pain, and Foot Problem  Patient presents for followed up of continued right foot pain.   Patient states his right foot pain has gotten progressively worse he states he is thinking about having the surgery that I have recommended next month.      Follow-up   Associated symptoms include joint swelling.   Foot Pain   Associated symptoms include joint swelling.   Ankle Pain         Review of Systems   Musculoskeletal: Positive for gait problem and joint swelling.   All other systems reviewed and are negative.      Objective:      Physical Exam   Constitutional: He appears well-developed and well-nourished.   Cardiovascular:   Pulses:       Dorsalis pedis pulses are 2+ on the right side, and 2+ on the left side.        Posterior tibial pulses are 2+ on the right side, and 2+ on the left side.   Pulmonary/Chest: Effort normal.   Musculoskeletal: He exhibits edema, tenderness and deformity.        Right foot: There is decreased range of motion and deformity.   Feet:   Right Foot:   Protective Sensation: 4 sites tested. 4 sites sensed.   Left Foot:   Protective Sensation: 4 sites tested. 4 sites sensed.   Neurological: He is alert.   Skin: Skin is warm. Capillary refill takes less than 2 seconds.   Psychiatric: He has a normal mood and affect. His behavior is normal. Judgment and thought content normal.   Nursing note and vitals reviewed.          Assessment:       1. Cuboid syndrome of right foot    2. Plantar fasciitis - Left Foot    3. Neuritis    4. MS (multiple sclerosis)    5. Tarsal tunnel syndrome of right side        Plan:          Patient presents today for follow-up of bilateral foot pain he relates the plantar fasciitis in the left foot is getting progressively worse he still having pain at the plantar fascial area of the right but now he is having even more pain at the base of the 5th metatarsal and the cuboid  bone this raises concern for cuboid syndrome.    Patient indicated today the cuboid area on his right foot is not really his primary area of discomfort the greatest area that he is having discomfort is along the course of the Phan's nerve and tarsal tunnel left he states he feels as if this was not causing him any discomfort he could handle the pain on the outside of the right foot patient had question me possibly doing a nerve ablation on the left foot also I have advised him we can only do 1 foot at a time and certainly the right foot is more problematic I have made him aware that I would recommend the nerve ablation on the left foot but a plantar fascial release with respect shin resection of the heel spur would likely eliminate the pain he is having in the left foot patient was in understanding and agreement we have discussed at length and I provided surgical consultation with the patient regarding a nerve ablation in the tarsal tunnel area right.   Patient's  was evaluated prior to prescribing his pain medication as he is under my care for pain management regarding severe bilateral foot pain right greater than left.  Patient continues to have findings consistent with cuboid syndrome right I did discuss fusion of the calcaneal cuboid joint. Follow-up will be 1 month unless the patient has any problems questions or concerns prior to this. Follow-up 1 month.  Patient will likely need further surgical intervention right foot.     Patient understands he is going to likely need surgery for the cuboid syndrome with a calcaneal cuboid fusion right and nerve ablation right.  Patient states right now he knows he needs surgery on his right foot but he is going to put this on hold it got has a lot of things going on in his life I told him I certainly understand this.  Patient indicated basically without the pain medication and Lyrica that I give him for pain management of both lower extremities he would not be able to  walk at all.   Patient's left foot is pretty much unchanged he understands he knows he needs surgery on the right foot but he has addressing these other issues in relationship to his MS.   Patient states he is planning on possibly pursuing surgery in the future.  Patient indicated that he does have bulging discs at L5 and S1 he states he     Patient believes his right foot is getting worse and his left foot is starting to get close to the level of his right foot.  Patient is having increased back pain.  I did discuss an ablation procedure of the patient's nerve pain on the right foot as well as a calcaneal cuboid fusion as previously discussed.  Patient's p.m. P was evaluated new pain contract executed today.  Patient's injuries can likely be traced back to his  service.   Patient states he wants to determine what's going on with his back in the next course for treatment of his back before pursuing the next step surgical procedure on the right foot.  Patient relates no change with his back pain.  Patient states his Achilles tendinitis on the right side is much better.  Definite inflammation is noted on the plantar lateral aspect of the patient's right foot where the area underlying the cuboid area is swollen and warm to the touch.  Plan follow-up will be 1 month.  .  Patient is continuing to have problems with a meniscal tear to his left knee.   This note was created using [employer] voice recognition software that occasionally misinterpreted phrases or words.

## 2020-10-16 ENCOUNTER — TELEPHONE (OUTPATIENT)
Dept: PODIATRY | Facility: CLINIC | Age: 45
End: 2020-10-16

## 2020-10-16 NOTE — TELEPHONE ENCOUNTER
----- Message from Rodo Vick sent at 10/16/2020  7:36 AM CDT -----  Contact: patient  Type:  RX Refill Request    Who Called:  patient  Refill or New Rx:  refill  RX Name and Strength:  percocet  How is the patient currently taking it? (ex. 1XDay):    Is this a 30 day or 90 day RX:    Preferred Pharmacy with phone number:  [facility]  Local or Mail Order:  local  Ordering Provider:  Dr.Benzing Martinez Call Back Number:  [phone]  Additional Information:  requesting a call back

## 2020-10-16 NOTE — TELEPHONE ENCOUNTER
LVM not due for refill until next month, if having issues will need to schedule appointment to be seen.

## 2020-11-09 ENCOUNTER — TELEPHONE (OUTPATIENT)
Dept: PODIATRY | Facility: CLINIC | Age: 45
End: 2020-11-09

## 2020-11-09 ENCOUNTER — LAB VISIT (OUTPATIENT)
Dept: LAB | Facility: HOSPITAL | Age: 45
End: 2020-11-09
Attending: PODIATRIST
Payer: MEDICARE

## 2020-11-09 ENCOUNTER — OFFICE VISIT (OUTPATIENT)
Dept: PODIATRY | Facility: CLINIC | Age: 45
End: 2020-11-09
Payer: MEDICARE

## 2020-11-09 VITALS
RESPIRATION RATE: 18 BRPM | HEART RATE: 74 BPM | BODY MASS INDEX: 27.77 KG/M2 | SYSTOLIC BLOOD PRESSURE: 131 MMHG | WEIGHT: 205 LBS | DIASTOLIC BLOOD PRESSURE: 80 MMHG | HEIGHT: 72 IN | TEMPERATURE: 98 F

## 2020-11-09 DIAGNOSIS — M72.2 PLANTAR FASCIITIS: ICD-10-CM

## 2020-11-09 DIAGNOSIS — S93.311A CUBOID SYNDROME OF RIGHT FOOT: ICD-10-CM

## 2020-11-09 DIAGNOSIS — M79.671 CHRONIC PAIN IN RIGHT FOOT: ICD-10-CM

## 2020-11-09 DIAGNOSIS — G89.29 CHRONIC PAIN IN RIGHT FOOT: ICD-10-CM

## 2020-11-09 DIAGNOSIS — M79.2 NEURITIS: Primary | ICD-10-CM

## 2020-11-09 DIAGNOSIS — G57.51 TARSAL TUNNEL SYNDROME OF RIGHT SIDE: ICD-10-CM

## 2020-11-09 DIAGNOSIS — M79.2 NEURITIS: ICD-10-CM

## 2020-11-09 LAB
AMPHET+METHAMPHET UR QL: NEGATIVE
BARBITURATES UR QL SCN>200 NG/ML: NEGATIVE
BENZODIAZ UR QL SCN>200 NG/ML: NEGATIVE
BZE UR QL SCN: NEGATIVE
CANNABINOIDS UR QL SCN: NEGATIVE
CREAT UR-MCNC: 395.3 MG/DL (ref 23–375)
OPIATES UR QL SCN: ABNORMAL
PCP UR QL SCN>25 NG/ML: NEGATIVE
TOXICOLOGY INFORMATION: ABNORMAL

## 2020-11-09 PROCEDURE — 99999 PR PBB SHADOW E&M-EST. PATIENT-LVL IV: CPT | Mod: PBBFAC,,, | Performed by: PODIATRIST

## 2020-11-09 PROCEDURE — 99999 PR PBB SHADOW E&M-EST. PATIENT-LVL IV: ICD-10-PCS | Mod: PBBFAC,,, | Performed by: PODIATRIST

## 2020-11-09 PROCEDURE — 99213 OFFICE O/P EST LOW 20 MIN: CPT | Mod: S$PBB,,, | Performed by: PODIATRIST

## 2020-11-09 PROCEDURE — 99214 OFFICE O/P EST MOD 30 MIN: CPT | Mod: PBBFAC | Performed by: PODIATRIST

## 2020-11-09 PROCEDURE — 99213 PR OFFICE/OUTPT VISIT, EST, LEVL III, 20-29 MIN: ICD-10-PCS | Mod: S$PBB,,, | Performed by: PODIATRIST

## 2020-11-09 PROCEDURE — 80307 DRUG TEST PRSMV CHEM ANLYZR: CPT

## 2020-11-09 RX ORDER — OXYCODONE AND ACETAMINOPHEN 10; 325 MG/1; MG/1
1 TABLET ORAL 4 TIMES DAILY
Qty: 120 TABLET | Refills: 0 | Status: SHIPPED | OUTPATIENT
Start: 2020-11-09 | End: 2020-12-09 | Stop reason: SDUPTHER

## 2020-11-10 NOTE — PROGRESS NOTES
Subjective:       Patient ID: Sg Sesay is a 44 y.o. male.    Chief Complaint: Follow-up  Patient presents for followed up of continued right foot pain.   Patient states his right foot pain has gotten progressively worse he states he is thinking about having the surgery that I have recommended at the beginning of next year.      Follow-up   Associated symptoms include joint swelling.   Foot Pain   Associated symptoms include joint swelling.   Ankle Pain         Review of Systems   Musculoskeletal: Positive for gait problem and joint swelling.   All other systems reviewed and are negative.      Objective:      Physical Exam   Constitutional: He appears well-developed and well-nourished.   Cardiovascular:   Pulses:       Dorsalis pedis pulses are 2+ on the right side, and 2+ on the left side.        Posterior tibial pulses are 2+ on the right side, and 2+ on the left side.   Pulmonary/Chest: Effort normal.   Musculoskeletal: He exhibits edema, tenderness and deformity.        Right foot: There is decreased range of motion and deformity.   Feet:   Right Foot:   Protective Sensation: 4 sites tested. 4 sites sensed.   Left Foot:   Protective Sensation: 4 sites tested. 4 sites sensed.   Neurological: He is alert.   Skin: Skin is warm. Capillary refill takes less than 2 seconds.   Psychiatric: He has a normal mood and affect. His behavior is normal. Judgment and thought content normal.   Nursing note and vitals reviewed.          Assessment:       1. Neuritis    2. Tarsal tunnel syndrome of right side    3. Cuboid syndrome of right foot    4. Plantar fasciitis - Left Foot    5. Chronic pain in right foot        Plan:          Patient presents today for follow-up of bilateral foot pain he relates the plantar fasciitis in the left foot is getting progressively worse he still having pain at the plantar fascial area of the right but now he is having even more pain at the base of the 5th metatarsal and the cuboid bone  this raises concern for cuboid syndrome.    Patient indicated today the cuboid area on his right foot is not really his primary area of discomfort the greatest area that he is having discomfort is along the course of the Phan's nerve and tarsal tunnel left he states he feels as if this was not causing him any discomfort he could handle the pain on the outside of the right foot patient had question me possibly doing a nerve ablation on the left foot also I have advised him we can only do 1 foot at a time and certainly the right foot is more problematic I have made him aware that I would recommend the nerve ablation on the left foot but a plantar fascial release with respect shin resection of the heel spur would likely eliminate the pain he is having in the left foot patient was in understanding and agreement we have discussed at length and I provided surgical consultation with the patient regarding a nerve ablation in the tarsal tunnel area right.   Patient's  was evaluated prior to prescribing his pain medication as he is under my care for pain management regarding severe bilateral foot pain right greater than left.  Patient continues to have findings consistent with cuboid syndrome right I did discuss fusion of the calcaneal cuboid joint. Follow-up will be 1 month unless the patient has any problems questions or concerns prior to this. Follow-up 1 month.  Patient will likely need further surgical intervention right foot.     Patient understands he is going to likely need surgery for the cuboid syndrome with a calcaneal cuboid fusion right and nerve ablation right.  Patient states right now he knows he needs surgery on his right foot but he is going to put this on hold it got has a lot of things going on in his life I told him I certainly understand this.  Patient indicated basically without the pain medication and Lyrica that I give him for pain management of both lower extremities he would not be able to walk  at all.   Patient's left foot is pretty much unchanged he understands he knows he needs surgery on the right foot but he has addressing these other issues in relationship to his MS.   Patient states he is planning on possibly pursuing surgery in the future.  Patient indicated that he does have bulging discs at L5 and S1 he states he     Patient believes his right foot is getting worse and his left foot is starting to get close to the level of his right foot.  Patient is having increased back pain.  Patient has not seen anybody since his last visit with me regarding follow-up of his.  I did discuss an ablation procedure of the patient's nerve pain on the right foot as well as a calcaneal cuboid fusion as previously discussed.  Patient's p.m. P was evaluated.  Patient's injuries can likely be traced back to his  service.   Patient states he wants to determine what's going on with his back in the next course for treatment of his back before pursuing the next step surgical procedure on the right foot.  Patient relates no change with his back pain.  Patient states his Achilles tendinitis on the right side is much better.  Definite inflammation is noted on the plantar lateral aspect of the patient's right foot where the area underlying the cuboid area is swollen and warm to the touch.  Plan follow-up will be 1 month.  .  Patient is continuing to have problems with a meniscal tear to his left knee.  Updated drug screen was performed today noted to be satisfactory new prescription dispensed to the patient.  This note was created using [employer] voice recognition software that occasionally misinterpreted phrases or words.

## 2020-12-09 ENCOUNTER — OFFICE VISIT (OUTPATIENT)
Dept: PODIATRY | Facility: CLINIC | Age: 45
End: 2020-12-09
Payer: MEDICARE

## 2020-12-09 VITALS
DIASTOLIC BLOOD PRESSURE: 96 MMHG | HEART RATE: 68 BPM | HEIGHT: 72 IN | TEMPERATURE: 98 F | SYSTOLIC BLOOD PRESSURE: 142 MMHG | RESPIRATION RATE: 19 BRPM | WEIGHT: 205 LBS | BODY MASS INDEX: 27.77 KG/M2 | OXYGEN SATURATION: 99 %

## 2020-12-09 DIAGNOSIS — G35 MS (MULTIPLE SCLEROSIS): ICD-10-CM

## 2020-12-09 DIAGNOSIS — M25.571 CHRONIC PAIN OF RIGHT ANKLE: ICD-10-CM

## 2020-12-09 DIAGNOSIS — M19.071 PRIMARY OSTEOARTHRITIS OF RIGHT FOOT: ICD-10-CM

## 2020-12-09 DIAGNOSIS — M72.2 PLANTAR FASCIITIS: ICD-10-CM

## 2020-12-09 DIAGNOSIS — G57.51 TARSAL TUNNEL SYNDROME OF RIGHT SIDE: ICD-10-CM

## 2020-12-09 DIAGNOSIS — M79.2 NEURITIS: Primary | ICD-10-CM

## 2020-12-09 DIAGNOSIS — G89.29 CHRONIC PAIN OF RIGHT ANKLE: ICD-10-CM

## 2020-12-09 PROCEDURE — 99213 OFFICE O/P EST LOW 20 MIN: CPT | Mod: S$PBB,,, | Performed by: PODIATRIST

## 2020-12-09 PROCEDURE — 99999 PR PBB SHADOW E&M-EST. PATIENT-LVL IV: CPT | Mod: PBBFAC,,, | Performed by: PODIATRIST

## 2020-12-09 PROCEDURE — 99999 PR PBB SHADOW E&M-EST. PATIENT-LVL IV: ICD-10-PCS | Mod: PBBFAC,,, | Performed by: PODIATRIST

## 2020-12-09 PROCEDURE — 99214 OFFICE O/P EST MOD 30 MIN: CPT | Mod: PBBFAC | Performed by: PODIATRIST

## 2020-12-09 PROCEDURE — 99213 PR OFFICE/OUTPT VISIT, EST, LEVL III, 20-29 MIN: ICD-10-PCS | Mod: S$PBB,,, | Performed by: PODIATRIST

## 2020-12-09 RX ORDER — NALOXONE HYDROCHLORIDE 4 MG/.1ML
SPRAY NASAL
COMMUNITY
Start: 2020-10-13

## 2020-12-09 RX ORDER — OXYCODONE AND ACETAMINOPHEN 10; 325 MG/1; MG/1
1 TABLET ORAL 4 TIMES DAILY
Qty: 120 TABLET | Refills: 0 | Status: SHIPPED | OUTPATIENT
Start: 2020-12-09 | End: 2021-01-04 | Stop reason: SDUPTHER

## 2020-12-09 RX ORDER — ZOLPIDEM TARTRATE 10 MG/1
TABLET ORAL
COMMUNITY
Start: 2020-06-07 | End: 2021-01-04

## 2020-12-09 RX ORDER — BUSPIRONE HYDROCHLORIDE 15 MG/1
TABLET ORAL
COMMUNITY
Start: 2020-09-09 | End: 2021-01-04

## 2020-12-09 NOTE — LETTER
December 9, 2020      [facility]  [address]  [place] [address]           [facility]  [address] [place] [address]  Phone: [phone]  Fax: [phone]          Patient: Sg Sesay   MR Number: [id number]   YOB: 1975   Date of Visit: 12/9/2020       Dear [facility]:    Thank you for referring Sg Sesay to me for evaluation. Attached you will find relevant portions of my assessment and plan of care.    If you have questions, please do not hesitate to call me. I look forward to following Sg Sesay along with you.    Sincerely,    Jah Torres, LISANDRO    Enclosure  CC:  No Recipients    If you would like to receive this communication electronically, please contact [email or web address] or [phone] to request more information on [facility] access.    For providers and/or their staff who would like to refer a patient to [facility], please contact us through our one-stop-shop provider referral line, [facility], at [phone].    If you feel you have received this communication in error or would no longer like to receive these types of communications, please e-mail [email or web address]

## 2020-12-09 NOTE — PROGRESS NOTES
Subjective:       Patient ID: Sg Sesay is a 44 y.o. male.    Chief Complaint: Follow-up  Patient presents for followed up of continued right foot pain.         Follow-up  Associated symptoms include joint swelling.   Foot Pain  Associated symptoms include joint swelling.   Ankle Pain        Review of Systems   Musculoskeletal: Positive for back pain, gait problem and joint swelling.   All other systems reviewed and are negative.      Objective:      Physical Exam  Vitals signs and nursing note reviewed.   Constitutional:       Appearance: He is well-developed.   Cardiovascular:      Pulses:           Dorsalis pedis pulses are 2+ on the right side and 2+ on the left side.        Posterior tibial pulses are 2+ on the right side and 2+ on the left side.   Pulmonary:      Effort: Pulmonary effort is normal.   Musculoskeletal:         General: Swelling, tenderness and deformity present.      Right foot: Decreased range of motion. Deformity present.        Feet:    Feet:      Right foot:      Protective Sensation: 4 sites tested. 4 sites sensed.      Skin integrity: Erythema and warmth present.      Left foot:      Protective Sensation: 4 sites tested. 4 sites sensed.   Skin:     General: Skin is warm.      Capillary Refill: Capillary refill takes less than 2 seconds.   Neurological:      General: No focal deficit present.      Mental Status: He is alert.   Psychiatric:         Behavior: Behavior normal.         Thought Content: Thought content normal.         Judgment: Judgment normal.         Assessment:       1. Neuritis    2. Tarsal tunnel syndrome of right side    3. MS (multiple sclerosis)    4. Chronic pain of right ankle    5. Primary osteoarthritis of right foot    6. Plantar fasciitis - Left Foot        Plan:          Patient presents today for follow-up of bilateral foot pain he relates the plantar fasciitis in the left foot is getting progressively worse he still having pain at the plantar fascial  area of the right but now he is having even more pain at the base of the 5th metatarsal and the cuboid bone this raises concern for cuboid syndrome.    Patient indicated today the cuboid area on his right foot is not really his primary area of discomfort the greatest area that he is having discomfort is along the course of the Phan's nerve and tarsal tunnel left he states he feels as if this was not causing him any discomfort he could handle the pain on the outside of the right foot patient had question me possibly doing a nerve ablation on the left foot also I have advised him we can only do 1 foot at a time and certainly the right foot is more problematic I have made him aware that I would recommend the nerve ablation on the left foot but a plantar fascial release with respect shin resection of the heel spur would likely eliminate the pain he is having in the left foot patient was in understanding and agreement we have discussed at length and I provided surgical consultation with the patient regarding a nerve ablation in the tarsal tunnel area right.   Patient's  was evaluated prior to prescribing his pain medication as he is under my care for pain management regarding severe bilateral foot pain right greater than left.  Patient continues to have findings consistent with cuboid syndrome right I did discuss fusion of the calcaneal cuboid joint. Follow-up will be 1 month unless the patient has any problems questions or concerns prior to this. Follow-up 1 month.  Patient will likely need further surgical intervention right foot.     Patient understands he is going to likely need surgery for the cuboid syndrome with a calcaneal cuboid fusion right and nerve ablation right.  Patient states right now he knows he needs surgery on his right foot but he is going to put this on hold it got has a lot of things going on in his life I told him I certainly understand this.  Patient indicated basically without the pain  medication and Lyrica that I give him for pain management of both lower extremities he would not be able to walk at all.   Patient's left foot is pretty much unchanged he understands he knows he needs surgery on the right foot but he has addressing these other issues in relationship to his MS.   Patient states he is planning on possibly pursuing surgery in the future.  Patient indicated that he does have bulging discs at L5 and S1 he states he     Patient believes his right foot is getting worse and his left foot is starting to get close to the level of his right foot.  Patient is having increased back pain.  I did discuss an ablation procedure of the patient's nerve pain on the right foot as well as a calcaneal cuboid fusion as previously discussed.  Patient's p.m. P was evaluated pain contract is up-to-date.  Patient's injuries can likely be traced back to his  service.  Plan follow-up will be 1 month.  Patient is currently under my care for pain management his p.m. P was evaluated he has a current up-to-date pain management contract and drug screen.  Patient states he is still not gotten an MRI of his back he states [facility] series of 6 video visits before he can have the MRI done he states his back is worsening.  Patient continues to have left heel pain he is having right Achilles tendon pain again this had improved to some degree but now it has flared up again I do believe this is directly related to compensation for likelihood patient's back and the right foot pain he is having.  Patient's pain prescription was renewed today plan follow-up will be 1 month.  This note was created using [employer] voice recognition software that occasionally misinterpreted phrases or words.

## 2020-12-28 ENCOUNTER — TELEPHONE (OUTPATIENT)
Dept: PODIATRY | Facility: CLINIC | Age: 45
End: 2020-12-28

## 2020-12-28 NOTE — TELEPHONE ENCOUNTER
----- Message from Consuelo Jasso sent at 12/28/2020 10:05 AM CST -----  Regarding: refill  Contact: Patient/[phone] (home)  Type:  RX Refill Request    Who Called:  Patient/[phone] (home)     Refill or New Rx:  Refill  RX Name and Strength: oxyCODONE-acetaminophen (PERCOCET)  mg per tablet   How is the patient currently taking it? (ex. 1XDay):  4xday  Is this a 30 day or 90 day RX:  120 pills  Preferred Pharmacy with phone number:    [id number], [place] - [address] [place] [address] AT [address]  [address]  [facility] [address]  Phone: [phone] Fax: [phone]    Local or Mail Order:  Local  Ordering Provider:  same

## 2020-12-28 NOTE — TELEPHONE ENCOUNTER
Advised patient rx is good through 1/8/2021 and he would not be able to fill until then. Patient verbalized understanding

## 2021-01-04 ENCOUNTER — OFFICE VISIT (OUTPATIENT)
Dept: PODIATRY | Facility: CLINIC | Age: 46
End: 2021-01-04
Payer: MEDICARE

## 2021-01-04 VITALS
SYSTOLIC BLOOD PRESSURE: 135 MMHG | BODY MASS INDEX: 27.09 KG/M2 | TEMPERATURE: 98 F | HEART RATE: 62 BPM | DIASTOLIC BLOOD PRESSURE: 84 MMHG | HEIGHT: 72 IN | WEIGHT: 200 LBS

## 2021-01-04 DIAGNOSIS — G57.51 TARSAL TUNNEL SYNDROME OF RIGHT SIDE: ICD-10-CM

## 2021-01-04 DIAGNOSIS — G89.29 CHRONIC BILATERAL LOW BACK PAIN WITH BILATERAL SCIATICA: ICD-10-CM

## 2021-01-04 DIAGNOSIS — G89.29 CHRONIC PAIN IN RIGHT FOOT: ICD-10-CM

## 2021-01-04 DIAGNOSIS — S93.311A CUBOID SYNDROME OF RIGHT FOOT: ICD-10-CM

## 2021-01-04 DIAGNOSIS — G35 MS (MULTIPLE SCLEROSIS): ICD-10-CM

## 2021-01-04 DIAGNOSIS — M79.2 NEURITIS: Primary | ICD-10-CM

## 2021-01-04 DIAGNOSIS — M54.41 CHRONIC BILATERAL LOW BACK PAIN WITH BILATERAL SCIATICA: ICD-10-CM

## 2021-01-04 DIAGNOSIS — M72.2 PLANTAR FASCIITIS: ICD-10-CM

## 2021-01-04 DIAGNOSIS — M79.671 CHRONIC PAIN IN RIGHT FOOT: ICD-10-CM

## 2021-01-04 DIAGNOSIS — M19.071 PRIMARY OSTEOARTHRITIS OF RIGHT FOOT: ICD-10-CM

## 2021-01-04 DIAGNOSIS — M54.42 CHRONIC BILATERAL LOW BACK PAIN WITH BILATERAL SCIATICA: ICD-10-CM

## 2021-01-04 PROCEDURE — 99214 OFFICE O/P EST MOD 30 MIN: CPT | Mod: S$PBB,,, | Performed by: PODIATRIST

## 2021-01-04 PROCEDURE — 99999 PR PBB SHADOW E&M-EST. PATIENT-LVL III: CPT | Mod: PBBFAC,,, | Performed by: PODIATRIST

## 2021-01-04 PROCEDURE — 99214 PR OFFICE/OUTPT VISIT, EST, LEVL IV, 30-39 MIN: ICD-10-PCS | Mod: S$PBB,,, | Performed by: PODIATRIST

## 2021-01-04 PROCEDURE — 99999 PR PBB SHADOW E&M-EST. PATIENT-LVL III: ICD-10-PCS | Mod: PBBFAC,,, | Performed by: PODIATRIST

## 2021-01-04 PROCEDURE — 99213 OFFICE O/P EST LOW 20 MIN: CPT | Mod: PBBFAC | Performed by: PODIATRIST

## 2021-01-04 RX ORDER — OXYCODONE AND ACETAMINOPHEN 10; 325 MG/1; MG/1
1 TABLET ORAL 4 TIMES DAILY
Qty: 120 TABLET | Refills: 0 | Status: SHIPPED | OUTPATIENT
Start: 2021-01-04 | End: 2021-01-27 | Stop reason: SDUPTHER

## 2021-01-04 RX ORDER — BUSPIRONE HYDROCHLORIDE 10 MG/1
TABLET ORAL
COMMUNITY
Start: 2020-11-30 | End: 2021-03-24 | Stop reason: SDUPTHER

## 2021-01-04 RX ORDER — KETOROLAC TROMETHAMINE 10 MG/1
TABLET, FILM COATED ORAL
COMMUNITY
Start: 2020-12-31 | End: 2021-03-24 | Stop reason: ALTCHOICE

## 2021-01-06 ENCOUNTER — TELEPHONE (OUTPATIENT)
Dept: PODIATRY | Facility: CLINIC | Age: 46
End: 2021-01-06

## 2021-01-07 DIAGNOSIS — M79.2 NEURITIS: ICD-10-CM

## 2021-01-07 DIAGNOSIS — S93.311A CUBOID SYNDROME OF RIGHT FOOT: Primary | ICD-10-CM

## 2021-01-07 DIAGNOSIS — M79.671 FOOT PAIN, RIGHT: ICD-10-CM

## 2021-01-27 ENCOUNTER — OFFICE VISIT (OUTPATIENT)
Dept: PODIATRY | Facility: CLINIC | Age: 46
End: 2021-01-27
Payer: MEDICARE

## 2021-01-27 ENCOUNTER — HOSPITAL ENCOUNTER (OUTPATIENT)
Dept: RADIOLOGY | Facility: HOSPITAL | Age: 46
Discharge: HOME | End: 2021-01-27
Attending: PODIATRIST
Payer: MEDICARE

## 2021-01-27 VITALS
WEIGHT: 200 LBS | HEIGHT: 72 IN | HEART RATE: 78 BPM | DIASTOLIC BLOOD PRESSURE: 93 MMHG | BODY MASS INDEX: 27.09 KG/M2 | OXYGEN SATURATION: 99 % | TEMPERATURE: 99 F | SYSTOLIC BLOOD PRESSURE: 145 MMHG | RESPIRATION RATE: 15 BRPM

## 2021-01-27 DIAGNOSIS — M79.2 NEURITIS: ICD-10-CM

## 2021-01-27 DIAGNOSIS — S93.311A CUBOID SYNDROME OF RIGHT FOOT: ICD-10-CM

## 2021-01-27 DIAGNOSIS — G35 MS (MULTIPLE SCLEROSIS): ICD-10-CM

## 2021-01-27 DIAGNOSIS — G57.51 TARSAL TUNNEL SYNDROME OF RIGHT SIDE: ICD-10-CM

## 2021-01-27 DIAGNOSIS — S93.311A CUBOID SYNDROME OF RIGHT FOOT: Primary | ICD-10-CM

## 2021-01-27 DIAGNOSIS — Z01.818 PRE-OP TESTING: ICD-10-CM

## 2021-01-27 DIAGNOSIS — M19.071 PRIMARY OSTEOARTHRITIS OF RIGHT FOOT: ICD-10-CM

## 2021-01-27 PROCEDURE — 99999 PR PBB SHADOW E&M-EST. PATIENT-LVL V: ICD-10-PCS | Mod: PBBFAC,,, | Performed by: PODIATRIST

## 2021-01-27 PROCEDURE — 99215 OFFICE O/P EST HI 40 MIN: CPT | Mod: S$PBB,,, | Performed by: PODIATRIST

## 2021-01-27 PROCEDURE — 99999 PR PBB SHADOW E&M-EST. PATIENT-LVL V: CPT | Mod: PBBFAC,,, | Performed by: PODIATRIST

## 2021-01-27 PROCEDURE — 99215 PR OFFICE/OUTPT VISIT, EST, LEVL V, 40-54 MIN: ICD-10-PCS | Mod: S$PBB,,, | Performed by: PODIATRIST

## 2021-01-27 PROCEDURE — 73630 XR FOOT COMPLETE 3 VIEW RIGHT: ICD-10-PCS | Mod: 26,RT,, | Performed by: RADIOLOGY

## 2021-01-27 PROCEDURE — 73630 X-RAY EXAM OF FOOT: CPT | Mod: TC,FY,RT

## 2021-01-27 PROCEDURE — 73630 X-RAY EXAM OF FOOT: CPT | Mod: 26,RT,, | Performed by: RADIOLOGY

## 2021-01-27 PROCEDURE — 99215 OFFICE O/P EST HI 40 MIN: CPT | Mod: PBBFAC,25 | Performed by: PODIATRIST

## 2021-01-27 RX ORDER — ALPRAZOLAM 0.25 MG/1
TABLET ORAL
COMMUNITY
Start: 2020-12-18 | End: 2021-01-29

## 2021-01-27 RX ORDER — LORAZEPAM 1 MG/1
TABLET ORAL
COMMUNITY
Start: 2021-01-26 | End: 2021-03-24

## 2021-01-27 RX ORDER — OXYCODONE AND ACETAMINOPHEN 10; 325 MG/1; MG/1
1 TABLET ORAL 4 TIMES DAILY
Qty: 120 TABLET | Refills: 0 | Status: SHIPPED | OUTPATIENT
Start: 2021-01-27 | End: 2021-02-26

## 2021-01-27 RX ORDER — ONDANSETRON HYDROCHLORIDE 8 MG/1
8 TABLET, FILM COATED ORAL EVERY 8 HOURS PRN
Qty: 42 TABLET | Refills: 1 | Status: SHIPPED | OUTPATIENT
Start: 2021-01-27 | End: 2021-02-10

## 2021-01-27 RX ORDER — TRAMADOL HYDROCHLORIDE 50 MG/1
50 TABLET ORAL EVERY 12 HOURS PRN
COMMUNITY
Start: 2021-01-05 | End: 2021-05-11

## 2021-01-27 RX ORDER — SULFAMETHOXAZOLE AND TRIMETHOPRIM 400; 80 MG/1; MG/1
2 TABLET ORAL 2 TIMES DAILY
Qty: 56 TABLET | Refills: 0 | Status: SHIPPED | OUTPATIENT
Start: 2021-01-27 | End: 2021-02-10

## 2021-01-29 ENCOUNTER — HOSPITAL ENCOUNTER (OUTPATIENT)
Dept: PREADMISSION TESTING | Facility: HOSPITAL | Age: 46
Discharge: HOME | End: 2021-01-29
Attending: PODIATRIST
Payer: MEDICARE

## 2021-01-29 ENCOUNTER — ANESTHESIA EVENT (OUTPATIENT)
Dept: SURGERY | Facility: HOSPITAL | Age: 46
End: 2021-01-29
Payer: MEDICARE

## 2021-01-29 VITALS — BODY MASS INDEX: 27.12 KG/M2 | HEIGHT: 72 IN

## 2021-01-29 PROCEDURE — 99900103 DSU ONLY-NO CHARGE-INITIAL HR (STAT)

## 2021-01-31 ENCOUNTER — LAB VISIT (OUTPATIENT)
Dept: FAMILY MEDICINE | Facility: CLINIC | Age: 46
End: 2021-01-31
Payer: MEDICARE

## 2021-01-31 DIAGNOSIS — Z01.818 PRE-OP TESTING: ICD-10-CM

## 2021-01-31 PROCEDURE — U0003 INFECTIOUS AGENT DETECTION BY NUCLEIC ACID (DNA OR RNA); SEVERE ACUTE RESPIRATORY SYNDROME CORONAVIRUS 2 (SARS-COV-2) (CORONAVIRUS DISEASE [COVID-19]), AMPLIFIED PROBE TECHNIQUE, MAKING USE OF HIGH THROUGHPUT TECHNOLOGIES AS DESCRIBED BY CMS-2020-01-R: HCPCS

## 2021-01-31 RX ORDER — SODIUM CHLORIDE, SODIUM LACTATE, POTASSIUM CHLORIDE, CALCIUM CHLORIDE 600; 310; 30; 20 MG/100ML; MG/100ML; MG/100ML; MG/100ML
INJECTION, SOLUTION INTRAVENOUS CONTINUOUS
Status: CANCELLED | OUTPATIENT
Start: 2021-02-02

## 2021-02-01 LAB — SARS-COV-2 RNA RESP QL NAA+PROBE: NOT DETECTED

## 2021-02-02 ENCOUNTER — ANESTHESIA (OUTPATIENT)
Dept: SURGERY | Facility: HOSPITAL | Age: 46
End: 2021-02-02
Payer: MEDICARE

## 2021-02-02 ENCOUNTER — HOSPITAL ENCOUNTER (OUTPATIENT)
Facility: HOSPITAL | Age: 46
Discharge: HOME | End: 2021-02-02
Attending: PODIATRIST | Admitting: PODIATRIST
Payer: MEDICARE

## 2021-02-02 VITALS
BODY MASS INDEX: 27.09 KG/M2 | RESPIRATION RATE: 7 BRPM | WEIGHT: 200 LBS | TEMPERATURE: 99 F | OXYGEN SATURATION: 96 % | HEIGHT: 72 IN | HEART RATE: 89 BPM | SYSTOLIC BLOOD PRESSURE: 120 MMHG | DIASTOLIC BLOOD PRESSURE: 82 MMHG

## 2021-02-02 DIAGNOSIS — G57.51 TARSAL TUNNEL SYNDROME OF RIGHT SIDE: ICD-10-CM

## 2021-02-02 DIAGNOSIS — G35 MS (MULTIPLE SCLEROSIS): ICD-10-CM

## 2021-02-02 DIAGNOSIS — M19.071 PRIMARY OSTEOARTHRITIS OF RIGHT FOOT: ICD-10-CM

## 2021-02-02 DIAGNOSIS — S93.311A CUBOID SYNDROME OF RIGHT FOOT: Primary | ICD-10-CM

## 2021-02-02 DIAGNOSIS — M25.871 MASS OF JOINT OF RIGHT FOOT: ICD-10-CM

## 2021-02-02 DIAGNOSIS — M79.671 FOOT PAIN, RIGHT: ICD-10-CM

## 2021-02-02 DIAGNOSIS — Z01.818 PRE-OP TESTING: ICD-10-CM

## 2021-02-02 DIAGNOSIS — M79.2 NEURITIS: ICD-10-CM

## 2021-02-02 PROCEDURE — 63600175 PHARM REV CODE 636 W HCPCS: Performed by: PODIATRIST

## 2021-02-02 PROCEDURE — C1713 ANCHOR/SCREW BN/BN,TIS/BN: HCPCS | Performed by: PODIATRIST

## 2021-02-02 PROCEDURE — 28735 FUSION OF FOOT BONES: CPT | Mod: RT,,, | Performed by: PODIATRIST

## 2021-02-02 PROCEDURE — 64704 PR REVISE/REPAIR HAND/FOOT NERVE: ICD-10-PCS | Mod: 59,RT,, | Performed by: PODIATRIST

## 2021-02-02 PROCEDURE — D9220A PRA ANESTHESIA: Mod: ,,, | Performed by: ANESTHESIOLOGY

## 2021-02-02 PROCEDURE — 37000008 HC ANESTHESIA 1ST 15 MINUTES: Performed by: PODIATRIST

## 2021-02-02 PROCEDURE — 88304 TISSUE EXAM BY PATHOLOGIST: CPT | Mod: 26,,, | Performed by: PATHOLOGY

## 2021-02-02 PROCEDURE — 27201423 OPTIME MED/SURG SUP & DEVICES STERILE SUPPLY: Performed by: PODIATRIST

## 2021-02-02 PROCEDURE — 27800903 OPTIME MED/SURG SUP & DEVICES OTHER IMPLANTS: Performed by: PODIATRIST

## 2021-02-02 PROCEDURE — 88305 TISSUE EXAM BY PATHOLOGIST: CPT | Mod: 26,,, | Performed by: PATHOLOGY

## 2021-02-02 PROCEDURE — 36000709 HC OR TIME LEV III EA ADD 15 MIN: Performed by: PODIATRIST

## 2021-02-02 PROCEDURE — 88305 TISSUE EXAM BY PATHOLOGIST: ICD-10-PCS | Mod: 26,,, | Performed by: PATHOLOGY

## 2021-02-02 PROCEDURE — 63600175 PHARM REV CODE 636 W HCPCS: Performed by: NURSE ANESTHETIST, CERTIFIED REGISTERED

## 2021-02-02 PROCEDURE — 64704 REVISE HAND/FOOT NERVE: CPT | Mod: 59,RT,, | Performed by: PODIATRIST

## 2021-02-02 PROCEDURE — 71000033 HC RECOVERY, INTIAL HOUR: Performed by: PODIATRIST

## 2021-02-02 PROCEDURE — 25000003 PHARM REV CODE 250: Performed by: PODIATRIST

## 2021-02-02 PROCEDURE — 88304 PR  SURG PATH,LEVEL III: ICD-10-PCS | Mod: 26,,, | Performed by: PATHOLOGY

## 2021-02-02 PROCEDURE — 88305 TISSUE EXAM BY PATHOLOGIST: CPT | Performed by: PATHOLOGY

## 2021-02-02 PROCEDURE — 28735 PR FUSION FOOT BONES,MIDTARSAL,OSTEOTMY: ICD-10-PCS | Mod: RT,,, | Performed by: PODIATRIST

## 2021-02-02 PROCEDURE — 25000003 PHARM REV CODE 250: Performed by: NURSE ANESTHETIST, CERTIFIED REGISTERED

## 2021-02-02 PROCEDURE — 28035 DECOMPRESSION OF TIBIA NERVE: CPT | Mod: 51,RT,, | Performed by: PODIATRIST

## 2021-02-02 PROCEDURE — 71000015 HC POSTOP RECOV 1ST HR: Performed by: PODIATRIST

## 2021-02-02 PROCEDURE — 28041 PR EXC TUMOR SOFT TISSUE FOOT/TOE SUBFASC 1.5+CM: ICD-10-PCS | Mod: 51,RT,, | Performed by: PODIATRIST

## 2021-02-02 PROCEDURE — 28041 EXC FOOT/TOE TUM DEP 1.5CM/>: CPT | Mod: 51,RT,, | Performed by: PODIATRIST

## 2021-02-02 PROCEDURE — 28035 PR TARSAL TUNNEL RELEASE: ICD-10-PCS | Mod: 51,RT,, | Performed by: PODIATRIST

## 2021-02-02 PROCEDURE — 36000708 HC OR TIME LEV III 1ST 15 MIN: Performed by: PODIATRIST

## 2021-02-02 PROCEDURE — 37000009 HC ANESTHESIA EA ADD 15 MINS: Performed by: PODIATRIST

## 2021-02-02 PROCEDURE — D9220A PRA ANESTHESIA: ICD-10-PCS | Mod: ,,, | Performed by: ANESTHESIOLOGY

## 2021-02-02 PROCEDURE — 88304 TISSUE EXAM BY PATHOLOGIST: CPT | Performed by: PATHOLOGY

## 2021-02-02 RX ORDER — PROPOFOL 10 MG/ML
VIAL (ML) INTRAVENOUS
Status: DISCONTINUED | OUTPATIENT
Start: 2021-02-02 | End: 2021-02-02

## 2021-02-02 RX ORDER — LIDOCAINE HYDROCHLORIDE 10 MG/ML
1 INJECTION, SOLUTION EPIDURAL; INFILTRATION; INTRACAUDAL; PERINEURAL ONCE
Status: DISCONTINUED | OUTPATIENT
Start: 2021-02-02 | End: 2021-02-02 | Stop reason: HOSPADM

## 2021-02-02 RX ORDER — LIDOCAINE HYDROCHLORIDE 10 MG/ML
INJECTION INFILTRATION; PERINEURAL
Status: DISCONTINUED | OUTPATIENT
Start: 2021-02-02 | End: 2021-02-02 | Stop reason: HOSPADM

## 2021-02-02 RX ORDER — SODIUM CHLORIDE, SODIUM LACTATE, POTASSIUM CHLORIDE, CALCIUM CHLORIDE 600; 310; 30; 20 MG/100ML; MG/100ML; MG/100ML; MG/100ML
125 INJECTION, SOLUTION INTRAVENOUS CONTINUOUS
Status: DISCONTINUED | OUTPATIENT
Start: 2021-02-02 | End: 2021-02-02 | Stop reason: HOSPADM

## 2021-02-02 RX ORDER — DEXAMETHASONE SODIUM PHOSPHATE 4 MG/ML
INJECTION, SOLUTION INTRA-ARTICULAR; INTRALESIONAL; INTRAMUSCULAR; INTRAVENOUS; SOFT TISSUE
Status: DISCONTINUED | OUTPATIENT
Start: 2021-02-02 | End: 2021-02-02

## 2021-02-02 RX ORDER — MEPERIDINE HYDROCHLORIDE 50 MG/ML
INJECTION INTRAMUSCULAR; INTRAVENOUS; SUBCUTANEOUS
Status: DISCONTINUED | OUTPATIENT
Start: 2021-02-02 | End: 2021-02-02

## 2021-02-02 RX ORDER — ONDANSETRON 2 MG/ML
4 INJECTION INTRAMUSCULAR; INTRAVENOUS DAILY PRN
Status: DISCONTINUED | OUTPATIENT
Start: 2021-02-02 | End: 2021-02-02 | Stop reason: HOSPADM

## 2021-02-02 RX ORDER — SODIUM CHLORIDE, SODIUM LACTATE, POTASSIUM CHLORIDE, CALCIUM CHLORIDE 600; 310; 30; 20 MG/100ML; MG/100ML; MG/100ML; MG/100ML
INJECTION, SOLUTION INTRAVENOUS CONTINUOUS
Status: DISCONTINUED | OUTPATIENT
Start: 2021-02-02 | End: 2021-02-02 | Stop reason: HOSPADM

## 2021-02-02 RX ORDER — MIDAZOLAM HYDROCHLORIDE 1 MG/ML
INJECTION, SOLUTION INTRAMUSCULAR; INTRAVENOUS
Status: DISCONTINUED | OUTPATIENT
Start: 2021-02-02 | End: 2021-02-02

## 2021-02-02 RX ORDER — ONDANSETRON 2 MG/ML
INJECTION INTRAMUSCULAR; INTRAVENOUS
Status: DISCONTINUED | OUTPATIENT
Start: 2021-02-02 | End: 2021-02-02

## 2021-02-02 RX ORDER — CEFAZOLIN SODIUM 2 G/50ML
SOLUTION INTRAVENOUS
Status: COMPLETED
Start: 2021-02-02 | End: 2021-02-02

## 2021-02-02 RX ORDER — CEFAZOLIN SODIUM 2 G/50ML
2 SOLUTION INTRAVENOUS
Status: COMPLETED | OUTPATIENT
Start: 2021-02-02 | End: 2021-02-02

## 2021-02-02 RX ORDER — BUPIVACAINE HYDROCHLORIDE 5 MG/ML
INJECTION, SOLUTION PERINEURAL
Status: DISCONTINUED | OUTPATIENT
Start: 2021-02-02 | End: 2021-02-02 | Stop reason: HOSPADM

## 2021-02-02 RX ORDER — LIDOCAINE HYDROCHLORIDE 20 MG/ML
INJECTION INTRAVENOUS
Status: DISCONTINUED | OUTPATIENT
Start: 2021-02-02 | End: 2021-02-02

## 2021-02-02 RX ADMIN — CEFAZOLIN SODIUM 2 G: 2 SOLUTION INTRAVENOUS at 07:02

## 2021-02-02 RX ADMIN — LIDOCAINE HYDROCHLORIDE 100 MG: 20 INJECTION, SOLUTION INTRAVENOUS at 07:02

## 2021-02-02 RX ADMIN — PROPOFOL 200 MG: 10 INJECTION, EMULSION INTRAVENOUS at 07:02

## 2021-02-02 RX ADMIN — DEXAMETHASONE SODIUM PHOSPHATE 4 MG: 4 INJECTION, SOLUTION INTRAMUSCULAR; INTRAVENOUS at 07:02

## 2021-02-02 RX ADMIN — MEPERIDINE HYDROCHLORIDE 50 MG: 50 INJECTION INTRAMUSCULAR; INTRAVENOUS; SUBCUTANEOUS at 07:02

## 2021-02-02 RX ADMIN — MIDAZOLAM HYDROCHLORIDE 2 MG: 1 INJECTION, SOLUTION INTRAMUSCULAR; INTRAVENOUS at 07:02

## 2021-02-02 RX ADMIN — SODIUM CHLORIDE, POTASSIUM CHLORIDE, SODIUM LACTATE AND CALCIUM CHLORIDE: 600; 310; 30; 20 INJECTION, SOLUTION INTRAVENOUS at 07:02

## 2021-02-02 RX ADMIN — ONDANSETRON 4 MG: 2 INJECTION INTRAMUSCULAR; INTRAVENOUS at 07:02

## 2021-02-04 ENCOUNTER — OFFICE VISIT (OUTPATIENT)
Dept: PODIATRY | Facility: CLINIC | Age: 46
End: 2021-02-04
Payer: MEDICARE

## 2021-02-04 VITALS
SYSTOLIC BLOOD PRESSURE: 145 MMHG | HEART RATE: 96 BPM | BODY MASS INDEX: 27.09 KG/M2 | HEIGHT: 72 IN | TEMPERATURE: 98 F | DIASTOLIC BLOOD PRESSURE: 92 MMHG | WEIGHT: 200 LBS

## 2021-02-04 DIAGNOSIS — S93.311A CUBOID SYNDROME OF RIGHT FOOT: Primary | ICD-10-CM

## 2021-02-04 PROCEDURE — 99999 PR PBB SHADOW E&M-EST. PATIENT-LVL III: CPT | Mod: PBBFAC,,, | Performed by: PODIATRIST

## 2021-02-04 PROCEDURE — 99024 PR POST-OP FOLLOW-UP VISIT: ICD-10-PCS | Mod: POP,,, | Performed by: PODIATRIST

## 2021-02-04 PROCEDURE — 99024 POSTOP FOLLOW-UP VISIT: CPT | Mod: POP,,, | Performed by: PODIATRIST

## 2021-02-04 PROCEDURE — 99999 PR PBB SHADOW E&M-EST. PATIENT-LVL III: ICD-10-PCS | Mod: PBBFAC,,, | Performed by: PODIATRIST

## 2021-02-04 PROCEDURE — 99213 OFFICE O/P EST LOW 20 MIN: CPT | Mod: PBBFAC,PN | Performed by: PODIATRIST

## 2021-02-04 RX ORDER — LIDOCAINE 50 MG/G
OINTMENT TOPICAL
COMMUNITY
Start: 2020-08-25 | End: 2023-09-25 | Stop reason: SDUPTHER

## 2021-02-04 RX ORDER — PREGABALIN 150 MG/1
150 CAPSULE ORAL 3 TIMES DAILY
Qty: 90 CAPSULE | Refills: 4 | Status: SHIPPED | OUTPATIENT
Start: 2021-02-04 | End: 2021-06-08

## 2021-02-05 ENCOUNTER — TELEPHONE (OUTPATIENT)
Dept: PODIATRY | Facility: CLINIC | Age: 46
End: 2021-02-05

## 2021-02-05 LAB
FINAL PATHOLOGIC DIAGNOSIS: NORMAL
GROSS: NORMAL
Lab: NORMAL
MICROSCOPIC EXAM: NORMAL

## 2021-02-17 ENCOUNTER — HOSPITAL ENCOUNTER (OUTPATIENT)
Dept: RADIOLOGY | Facility: HOSPITAL | Age: 46
Discharge: HOME | End: 2021-02-17
Attending: PODIATRIST
Payer: MEDICARE

## 2021-02-17 ENCOUNTER — OFFICE VISIT (OUTPATIENT)
Dept: PODIATRY | Facility: CLINIC | Age: 46
End: 2021-02-17
Payer: MEDICARE

## 2021-02-17 VITALS
WEIGHT: 200 LBS | SYSTOLIC BLOOD PRESSURE: 128 MMHG | HEART RATE: 90 BPM | DIASTOLIC BLOOD PRESSURE: 85 MMHG | TEMPERATURE: 98 F | HEIGHT: 72 IN | BODY MASS INDEX: 27.09 KG/M2 | OXYGEN SATURATION: 98 %

## 2021-02-17 DIAGNOSIS — S93.311A CUBOID SYNDROME OF RIGHT FOOT: Primary | ICD-10-CM

## 2021-02-17 DIAGNOSIS — S93.311A CUBOID SYNDROME OF RIGHT FOOT: ICD-10-CM

## 2021-02-17 PROCEDURE — 99999 PR PBB SHADOW E&M-EST. PATIENT-LVL V: CPT | Mod: PBBFAC,,, | Performed by: PODIATRIST

## 2021-02-17 PROCEDURE — 73630 XR FOOT COMPLETE 3 VIEW RIGHT: ICD-10-PCS | Mod: 26,RT,, | Performed by: RADIOLOGY

## 2021-02-17 PROCEDURE — 99024 PR POST-OP FOLLOW-UP VISIT: ICD-10-PCS | Mod: POP,,, | Performed by: PODIATRIST

## 2021-02-17 PROCEDURE — 29405 PR APPLY SHORT LEG CAST: ICD-10-PCS | Mod: 58,S$PBB,RT, | Performed by: PODIATRIST

## 2021-02-17 PROCEDURE — 99999 PR PBB SHADOW E&M-EST. PATIENT-LVL V: ICD-10-PCS | Mod: PBBFAC,,, | Performed by: PODIATRIST

## 2021-02-17 PROCEDURE — 73630 X-RAY EXAM OF FOOT: CPT | Mod: 26,RT,, | Performed by: RADIOLOGY

## 2021-02-17 PROCEDURE — 73630 X-RAY EXAM OF FOOT: CPT | Mod: TC,FY,RT

## 2021-02-17 PROCEDURE — 99024 POSTOP FOLLOW-UP VISIT: CPT | Mod: POP,,, | Performed by: PODIATRIST

## 2021-02-17 PROCEDURE — 29405 APPL SHORT LEG CAST: CPT | Mod: 58,S$PBB,RT, | Performed by: PODIATRIST

## 2021-02-17 PROCEDURE — 99215 OFFICE O/P EST HI 40 MIN: CPT | Mod: PBBFAC,25 | Performed by: PODIATRIST

## 2021-02-17 PROCEDURE — 29405 APPL SHORT LEG CAST: CPT | Mod: PBBFAC,RT | Performed by: PODIATRIST

## 2021-02-17 RX ORDER — CHOLECALCIFEROL (VITAMIN D3) 25 MCG
TABLET ORAL
COMMUNITY
Start: 2020-08-25 | End: 2023-12-27

## 2021-03-03 ENCOUNTER — OFFICE VISIT (OUTPATIENT)
Dept: PODIATRY | Facility: CLINIC | Age: 46
End: 2021-03-03
Payer: MEDICARE

## 2021-03-03 ENCOUNTER — HOSPITAL ENCOUNTER (OUTPATIENT)
Dept: RADIOLOGY | Facility: HOSPITAL | Age: 46
Discharge: HOME | End: 2021-03-03
Attending: PODIATRIST
Payer: MEDICARE

## 2021-03-03 VITALS
TEMPERATURE: 98 F | DIASTOLIC BLOOD PRESSURE: 76 MMHG | HEART RATE: 77 BPM | HEIGHT: 72 IN | BODY MASS INDEX: 27.09 KG/M2 | SYSTOLIC BLOOD PRESSURE: 128 MMHG | WEIGHT: 200 LBS

## 2021-03-03 DIAGNOSIS — S93.311A CUBOID SYNDROME OF RIGHT FOOT: Primary | ICD-10-CM

## 2021-03-03 DIAGNOSIS — S93.311A CUBOID SYNDROME OF RIGHT FOOT: ICD-10-CM

## 2021-03-03 DIAGNOSIS — M72.2 PLANTAR FASCIITIS: ICD-10-CM

## 2021-03-03 DIAGNOSIS — G57.51 TARSAL TUNNEL SYNDROME OF RIGHT SIDE: ICD-10-CM

## 2021-03-03 PROCEDURE — 73630 X-RAY EXAM OF FOOT: CPT | Mod: 26,RT,, | Performed by: RADIOLOGY

## 2021-03-03 PROCEDURE — 99215 OFFICE O/P EST HI 40 MIN: CPT | Mod: PBBFAC,25 | Performed by: PODIATRIST

## 2021-03-03 PROCEDURE — 73630 XR FOOT COMPLETE 3 VIEW RIGHT: ICD-10-PCS | Mod: 26,RT,, | Performed by: RADIOLOGY

## 2021-03-03 PROCEDURE — 99999 PR PBB SHADOW E&M-EST. PATIENT-LVL V: CPT | Mod: PBBFAC,,, | Performed by: PODIATRIST

## 2021-03-03 PROCEDURE — 73630 X-RAY EXAM OF FOOT: CPT | Mod: TC,FY,RT

## 2021-03-03 PROCEDURE — 99999 PR PBB SHADOW E&M-EST. PATIENT-LVL V: ICD-10-PCS | Mod: PBBFAC,,, | Performed by: PODIATRIST

## 2021-03-03 PROCEDURE — 99024 PR POST-OP FOLLOW-UP VISIT: ICD-10-PCS | Mod: POP,,, | Performed by: PODIATRIST

## 2021-03-03 PROCEDURE — 99024 POSTOP FOLLOW-UP VISIT: CPT | Mod: POP,,, | Performed by: PODIATRIST

## 2021-03-10 ENCOUNTER — HOSPITAL ENCOUNTER (OUTPATIENT)
Dept: RADIOLOGY | Facility: HOSPITAL | Age: 46
Discharge: HOME | End: 2021-03-10
Attending: PODIATRIST
Payer: MEDICARE

## 2021-03-10 ENCOUNTER — OFFICE VISIT (OUTPATIENT)
Dept: PODIATRY | Facility: CLINIC | Age: 46
End: 2021-03-10
Payer: MEDICARE

## 2021-03-10 VITALS
HEART RATE: 82 BPM | DIASTOLIC BLOOD PRESSURE: 86 MMHG | TEMPERATURE: 99 F | BODY MASS INDEX: 27.09 KG/M2 | WEIGHT: 200 LBS | SYSTOLIC BLOOD PRESSURE: 129 MMHG | HEIGHT: 72 IN

## 2021-03-10 DIAGNOSIS — S93.311A CUBOID SYNDROME OF RIGHT FOOT: Primary | ICD-10-CM

## 2021-03-10 DIAGNOSIS — M79.671 FOOT PAIN, RIGHT: ICD-10-CM

## 2021-03-10 DIAGNOSIS — S93.311A CUBOID SYNDROME OF RIGHT FOOT: ICD-10-CM

## 2021-03-10 PROCEDURE — 73630 X-RAY EXAM OF FOOT: CPT | Mod: 26,RT,, | Performed by: RADIOLOGY

## 2021-03-10 PROCEDURE — 99999 PR PBB SHADOW E&M-EST. PATIENT-LVL IV: CPT | Mod: PBBFAC,,, | Performed by: PODIATRIST

## 2021-03-10 PROCEDURE — 99214 OFFICE O/P EST MOD 30 MIN: CPT | Mod: PBBFAC,25 | Performed by: PODIATRIST

## 2021-03-10 PROCEDURE — 99999 PR PBB SHADOW E&M-EST. PATIENT-LVL IV: ICD-10-PCS | Mod: PBBFAC,,, | Performed by: PODIATRIST

## 2021-03-10 PROCEDURE — 73630 X-RAY EXAM OF FOOT: CPT | Mod: TC,FY,RT

## 2021-03-10 PROCEDURE — 99024 PR POST-OP FOLLOW-UP VISIT: ICD-10-PCS | Mod: POP,,, | Performed by: PODIATRIST

## 2021-03-10 PROCEDURE — 99024 POSTOP FOLLOW-UP VISIT: CPT | Mod: POP,,, | Performed by: PODIATRIST

## 2021-03-10 PROCEDURE — 73630 XR FOOT COMPLETE 3 VIEW RIGHT: ICD-10-PCS | Mod: 26,RT,, | Performed by: RADIOLOGY

## 2021-03-24 ENCOUNTER — HOSPITAL ENCOUNTER (OUTPATIENT)
Dept: RADIOLOGY | Facility: HOSPITAL | Age: 46
Discharge: HOME | End: 2021-03-24
Attending: PODIATRIST
Payer: MEDICARE

## 2021-03-24 ENCOUNTER — OFFICE VISIT (OUTPATIENT)
Dept: PODIATRY | Facility: CLINIC | Age: 46
End: 2021-03-24
Payer: MEDICARE

## 2021-03-24 ENCOUNTER — TELEPHONE (OUTPATIENT)
Dept: PODIATRY | Facility: CLINIC | Age: 46
End: 2021-03-24

## 2021-03-24 VITALS
RESPIRATION RATE: 18 BRPM | TEMPERATURE: 99 F | SYSTOLIC BLOOD PRESSURE: 119 MMHG | HEART RATE: 82 BPM | BODY MASS INDEX: 27.77 KG/M2 | HEIGHT: 72 IN | DIASTOLIC BLOOD PRESSURE: 71 MMHG | WEIGHT: 205 LBS

## 2021-03-24 DIAGNOSIS — M79.2 NEURITIS: ICD-10-CM

## 2021-03-24 DIAGNOSIS — G57.51 TARSAL TUNNEL SYNDROME OF RIGHT SIDE: ICD-10-CM

## 2021-03-24 DIAGNOSIS — G89.29 CHRONIC PAIN IN RIGHT FOOT: Primary | ICD-10-CM

## 2021-03-24 DIAGNOSIS — M79.671 CHRONIC PAIN IN RIGHT FOOT: ICD-10-CM

## 2021-03-24 DIAGNOSIS — G89.29 CHRONIC PAIN IN RIGHT FOOT: ICD-10-CM

## 2021-03-24 DIAGNOSIS — S93.311A CUBOID SYNDROME OF RIGHT FOOT: ICD-10-CM

## 2021-03-24 DIAGNOSIS — M79.671 CHRONIC PAIN IN RIGHT FOOT: Primary | ICD-10-CM

## 2021-03-24 DIAGNOSIS — G35 MS (MULTIPLE SCLEROSIS): ICD-10-CM

## 2021-03-24 PROCEDURE — 99024 PR POST-OP FOLLOW-UP VISIT: ICD-10-PCS | Mod: POP,,, | Performed by: PODIATRIST

## 2021-03-24 PROCEDURE — 73630 XR FOOT COMPLETE 3 VIEW RIGHT: ICD-10-PCS | Mod: 26,RT,, | Performed by: RADIOLOGY

## 2021-03-24 PROCEDURE — 99999 PR PBB SHADOW E&M-EST. PATIENT-LVL V: CPT | Mod: PBBFAC,,, | Performed by: PODIATRIST

## 2021-03-24 PROCEDURE — 99024 POSTOP FOLLOW-UP VISIT: CPT | Mod: POP,,, | Performed by: PODIATRIST

## 2021-03-24 PROCEDURE — 99215 OFFICE O/P EST HI 40 MIN: CPT | Mod: PBBFAC,25 | Performed by: PODIATRIST

## 2021-03-24 PROCEDURE — 73630 X-RAY EXAM OF FOOT: CPT | Mod: TC,FY,RT

## 2021-03-24 PROCEDURE — 73630 X-RAY EXAM OF FOOT: CPT | Mod: 26,RT,, | Performed by: RADIOLOGY

## 2021-03-24 PROCEDURE — 99999 PR PBB SHADOW E&M-EST. PATIENT-LVL V: ICD-10-PCS | Mod: PBBFAC,,, | Performed by: PODIATRIST

## 2021-03-24 RX ORDER — DOXEPIN HYDROCHLORIDE 50 MG/1
CAPSULE ORAL
COMMUNITY
Start: 2021-02-09 | End: 2021-03-24 | Stop reason: SDUPTHER

## 2021-03-24 RX ORDER — BUSPIRONE HYDROCHLORIDE 10 MG/1
TABLET ORAL
COMMUNITY
Start: 2021-02-09 | End: 2021-12-13 | Stop reason: ALTCHOICE

## 2021-03-24 RX ORDER — DICLOFENAC SODIUM 75 MG/1
75 TABLET, DELAYED RELEASE ORAL 2 TIMES DAILY
Qty: 60 TABLET | Refills: 3 | Status: SHIPPED | OUTPATIENT
Start: 2021-03-24 | End: 2021-04-23

## 2021-04-14 ENCOUNTER — HOSPITAL ENCOUNTER (OUTPATIENT)
Dept: RADIOLOGY | Facility: HOSPITAL | Age: 46
Discharge: HOME | End: 2021-04-14
Attending: PODIATRIST
Payer: MEDICARE

## 2021-04-14 ENCOUNTER — OFFICE VISIT (OUTPATIENT)
Dept: PODIATRY | Facility: CLINIC | Age: 46
End: 2021-04-14
Payer: MEDICARE

## 2021-04-14 VITALS
DIASTOLIC BLOOD PRESSURE: 93 MMHG | WEIGHT: 205 LBS | RESPIRATION RATE: 18 BRPM | HEIGHT: 72 IN | TEMPERATURE: 97 F | BODY MASS INDEX: 27.77 KG/M2 | HEART RATE: 73 BPM | SYSTOLIC BLOOD PRESSURE: 133 MMHG

## 2021-04-14 DIAGNOSIS — M79.671 FOOT PAIN, RIGHT: ICD-10-CM

## 2021-04-14 DIAGNOSIS — G57.51 TARSAL TUNNEL SYNDROME OF RIGHT SIDE: ICD-10-CM

## 2021-04-14 DIAGNOSIS — M72.2 PLANTAR FASCIITIS: ICD-10-CM

## 2021-04-14 DIAGNOSIS — S93.311A CUBOID SYNDROME OF RIGHT FOOT: ICD-10-CM

## 2021-04-14 DIAGNOSIS — S93.311A CUBOID SYNDROME OF RIGHT FOOT: Primary | ICD-10-CM

## 2021-04-14 DIAGNOSIS — M79.2 NEURITIS: ICD-10-CM

## 2021-04-14 DIAGNOSIS — M19.071 PRIMARY OSTEOARTHRITIS OF RIGHT FOOT: ICD-10-CM

## 2021-04-14 PROCEDURE — 99215 OFFICE O/P EST HI 40 MIN: CPT | Mod: PBBFAC,25 | Performed by: PODIATRIST

## 2021-04-14 PROCEDURE — 73630 X-RAY EXAM OF FOOT: CPT | Mod: TC,FY,RT

## 2021-04-14 PROCEDURE — 73630 XR FOOT COMPLETE 3 VIEW RIGHT: ICD-10-PCS | Mod: 26,RT,, | Performed by: RADIOLOGY

## 2021-04-14 PROCEDURE — 99999 PR PBB SHADOW E&M-EST. PATIENT-LVL V: ICD-10-PCS | Mod: PBBFAC,,, | Performed by: PODIATRIST

## 2021-04-14 PROCEDURE — 99024 POSTOP FOLLOW-UP VISIT: CPT | Mod: POP,,, | Performed by: PODIATRIST

## 2021-04-14 PROCEDURE — 73630 X-RAY EXAM OF FOOT: CPT | Mod: 26,RT,, | Performed by: RADIOLOGY

## 2021-04-14 PROCEDURE — 99024 PR POST-OP FOLLOW-UP VISIT: ICD-10-PCS | Mod: POP,,, | Performed by: PODIATRIST

## 2021-04-14 PROCEDURE — 99999 PR PBB SHADOW E&M-EST. PATIENT-LVL V: CPT | Mod: PBBFAC,,, | Performed by: PODIATRIST

## 2021-04-14 RX ORDER — OXYCODONE AND ACETAMINOPHEN 10; 325 MG/1; MG/1
1 TABLET ORAL 4 TIMES DAILY
COMMUNITY
Start: 2021-03-26 | End: 2021-04-17

## 2021-05-10 ENCOUNTER — HOSPITAL ENCOUNTER (OUTPATIENT)
Dept: RADIOLOGY | Facility: HOSPITAL | Age: 46
Discharge: HOME | End: 2021-05-10
Attending: PODIATRIST
Payer: MEDICARE

## 2021-05-10 ENCOUNTER — OFFICE VISIT (OUTPATIENT)
Dept: PODIATRY | Facility: CLINIC | Age: 46
End: 2021-05-10
Payer: MEDICARE

## 2021-05-10 VITALS
BODY MASS INDEX: 27.09 KG/M2 | TEMPERATURE: 98 F | SYSTOLIC BLOOD PRESSURE: 136 MMHG | WEIGHT: 200 LBS | DIASTOLIC BLOOD PRESSURE: 79 MMHG | HEIGHT: 72 IN | HEART RATE: 81 BPM

## 2021-05-10 DIAGNOSIS — M79.671 FOOT PAIN, RIGHT: ICD-10-CM

## 2021-05-10 DIAGNOSIS — S93.311A CUBOID SYNDROME OF RIGHT FOOT: ICD-10-CM

## 2021-05-10 DIAGNOSIS — M25.571 SINUS TARSI SYNDROME OF RIGHT FOOT: Primary | ICD-10-CM

## 2021-05-10 DIAGNOSIS — G57.51 TARSAL TUNNEL SYNDROME OF RIGHT SIDE: ICD-10-CM

## 2021-05-10 DIAGNOSIS — M79.2 NEURITIS: ICD-10-CM

## 2021-05-10 PROCEDURE — 99214 OFFICE O/P EST MOD 30 MIN: CPT | Mod: S$PBB,,, | Performed by: PODIATRIST

## 2021-05-10 PROCEDURE — 99215 OFFICE O/P EST HI 40 MIN: CPT | Mod: PBBFAC,25 | Performed by: PODIATRIST

## 2021-05-10 PROCEDURE — 99999 PR PBB SHADOW E&M-EST. PATIENT-LVL V: CPT | Mod: PBBFAC,,, | Performed by: PODIATRIST

## 2021-05-10 PROCEDURE — 73630 XR FOOT COMPLETE 3 VIEW RIGHT: ICD-10-PCS | Mod: 26,RT,, | Performed by: RADIOLOGY

## 2021-05-10 PROCEDURE — 99214 PR OFFICE/OUTPT VISIT, EST, LEVL IV, 30-39 MIN: ICD-10-PCS | Mod: S$PBB,,, | Performed by: PODIATRIST

## 2021-05-10 PROCEDURE — 99999 PR PBB SHADOW E&M-EST. PATIENT-LVL V: ICD-10-PCS | Mod: PBBFAC,,, | Performed by: PODIATRIST

## 2021-05-10 PROCEDURE — 73630 X-RAY EXAM OF FOOT: CPT | Mod: TC,FY,RT

## 2021-05-10 PROCEDURE — 73630 X-RAY EXAM OF FOOT: CPT | Mod: 26,RT,, | Performed by: RADIOLOGY

## 2021-05-14 ENCOUNTER — TELEPHONE (OUTPATIENT)
Dept: PODIATRY | Facility: CLINIC | Age: 46
End: 2021-05-14

## 2021-05-19 ENCOUNTER — OFFICE VISIT (OUTPATIENT)
Dept: PODIATRY | Facility: CLINIC | Age: 46
End: 2021-05-19
Payer: MEDICARE

## 2021-05-19 ENCOUNTER — TELEPHONE (OUTPATIENT)
Dept: PODIATRY | Facility: CLINIC | Age: 46
End: 2021-05-19

## 2021-05-19 ENCOUNTER — LAB VISIT (OUTPATIENT)
Dept: LAB | Facility: HOSPITAL | Age: 46
End: 2021-05-19
Attending: PODIATRIST
Payer: MEDICARE

## 2021-05-19 VITALS
WEIGHT: 200 LBS | SYSTOLIC BLOOD PRESSURE: 137 MMHG | HEART RATE: 69 BPM | DIASTOLIC BLOOD PRESSURE: 86 MMHG | TEMPERATURE: 98 F | HEIGHT: 72 IN | BODY MASS INDEX: 27.09 KG/M2

## 2021-05-19 DIAGNOSIS — G89.29 CHRONIC PAIN IN RIGHT FOOT: ICD-10-CM

## 2021-05-19 DIAGNOSIS — M76.70 PERONEAL TENDONITIS, UNSPECIFIED LATERALITY: ICD-10-CM

## 2021-05-19 DIAGNOSIS — M19.071 PRIMARY OSTEOARTHRITIS OF RIGHT FOOT: ICD-10-CM

## 2021-05-19 DIAGNOSIS — G89.29 CHRONIC PAIN IN RIGHT FOOT: Primary | ICD-10-CM

## 2021-05-19 DIAGNOSIS — M79.671 CHRONIC PAIN IN RIGHT FOOT: Primary | ICD-10-CM

## 2021-05-19 DIAGNOSIS — G35 MS (MULTIPLE SCLEROSIS): ICD-10-CM

## 2021-05-19 DIAGNOSIS — M79.2 NEURITIS: ICD-10-CM

## 2021-05-19 DIAGNOSIS — M79.671 CHRONIC PAIN IN RIGHT FOOT: ICD-10-CM

## 2021-05-19 DIAGNOSIS — M25.571 SINUS TARSITIS OF RIGHT FOOT: ICD-10-CM

## 2021-05-19 PROCEDURE — 99215 PR OFFICE/OUTPT VISIT, EST, LEVL V, 40-54 MIN: ICD-10-PCS | Mod: 57,S$PBB,, | Performed by: PODIATRIST

## 2021-05-19 PROCEDURE — 99999 PR PBB SHADOW E&M-EST. PATIENT-LVL III: CPT | Mod: PBBFAC,,, | Performed by: PODIATRIST

## 2021-05-19 PROCEDURE — 99213 OFFICE O/P EST LOW 20 MIN: CPT | Mod: PBBFAC | Performed by: PODIATRIST

## 2021-05-19 PROCEDURE — 99999 PR PBB SHADOW E&M-EST. PATIENT-LVL III: ICD-10-PCS | Mod: PBBFAC,,, | Performed by: PODIATRIST

## 2021-05-19 PROCEDURE — 99215 OFFICE O/P EST HI 40 MIN: CPT | Mod: 57,S$PBB,, | Performed by: PODIATRIST

## 2021-05-19 PROCEDURE — 80307 DRUG TEST PRSMV CHEM ANLYZR: CPT | Performed by: PODIATRIST

## 2021-05-19 RX ORDER — TERIFLUNOMIDE 7 MG/1
7 TABLET, FILM COATED ORAL DAILY
COMMUNITY
Start: 2021-01-07 | End: 2022-07-06 | Stop reason: ALTCHOICE

## 2021-05-19 RX ORDER — LORAZEPAM 1 MG/1
TABLET ORAL
Status: ON HOLD | COMMUNITY
Start: 2021-01-26 | End: 2021-05-25 | Stop reason: CLARIF

## 2021-05-19 RX ORDER — OXYCODONE AND ACETAMINOPHEN 10; 325 MG/1; MG/1
1 TABLET ORAL 3 TIMES DAILY
Qty: 90 TABLET | Refills: 0 | Status: SHIPPED | OUTPATIENT
Start: 2021-05-19 | End: 2021-06-18

## 2021-05-19 RX ORDER — KETOROLAC TROMETHAMINE 10 MG/1
10 TABLET, FILM COATED ORAL
Status: ON HOLD | COMMUNITY
Start: 2020-12-31 | End: 2021-05-25 | Stop reason: CLARIF

## 2021-05-22 PROBLEM — M25.571 SINUS TARSITIS OF RIGHT FOOT: Status: ACTIVE | Noted: 2021-05-22

## 2021-05-22 PROBLEM — M76.70 PERONEAL TENDONITIS, UNSPECIFIED LATERALITY: Status: ACTIVE | Noted: 2021-05-22

## 2021-05-22 RX ORDER — SODIUM CHLORIDE, SODIUM LACTATE, POTASSIUM CHLORIDE, CALCIUM CHLORIDE 600; 310; 30; 20 MG/100ML; MG/100ML; MG/100ML; MG/100ML
INJECTION, SOLUTION INTRAVENOUS CONTINUOUS
Status: CANCELLED | OUTPATIENT
Start: 2021-05-25

## 2021-05-24 ENCOUNTER — ANESTHESIA EVENT (OUTPATIENT)
Dept: SURGERY | Facility: HOSPITAL | Age: 46
End: 2021-05-24
Payer: MEDICARE

## 2021-05-24 ENCOUNTER — TELEPHONE (OUTPATIENT)
Dept: PODIATRY | Facility: CLINIC | Age: 46
End: 2021-05-24

## 2021-05-24 ENCOUNTER — HOSPITAL ENCOUNTER (OUTPATIENT)
Dept: PREADMISSION TESTING | Facility: HOSPITAL | Age: 46
Discharge: HOME | End: 2021-05-24
Attending: PODIATRIST
Payer: MEDICARE

## 2021-05-24 PROCEDURE — 99900103 DSU ONLY-NO CHARGE-INITIAL HR (STAT)

## 2021-05-25 ENCOUNTER — ANESTHESIA (OUTPATIENT)
Dept: SURGERY | Facility: HOSPITAL | Age: 46
End: 2021-05-25
Payer: MEDICARE

## 2021-05-25 ENCOUNTER — HOSPITAL ENCOUNTER (OUTPATIENT)
Facility: HOSPITAL | Age: 46
Discharge: HOME | End: 2021-05-25
Attending: PODIATRIST | Admitting: PODIATRIST
Payer: MEDICARE

## 2021-05-25 VITALS
HEART RATE: 72 BPM | OXYGEN SATURATION: 99 % | WEIGHT: 200 LBS | BODY MASS INDEX: 27.09 KG/M2 | RESPIRATION RATE: 16 BRPM | HEIGHT: 72 IN | SYSTOLIC BLOOD PRESSURE: 117 MMHG | DIASTOLIC BLOOD PRESSURE: 92 MMHG | TEMPERATURE: 98 F

## 2021-05-25 DIAGNOSIS — M76.70 PERONEAL TENDONITIS, UNSPECIFIED LATERALITY: ICD-10-CM

## 2021-05-25 DIAGNOSIS — G89.29 CHRONIC PAIN IN RIGHT FOOT: ICD-10-CM

## 2021-05-25 DIAGNOSIS — M19.071 PRIMARY OSTEOARTHRITIS OF RIGHT FOOT: ICD-10-CM

## 2021-05-25 DIAGNOSIS — M79.2 NEURITIS: Primary | ICD-10-CM

## 2021-05-25 DIAGNOSIS — M79.671 CHRONIC PAIN IN RIGHT FOOT: ICD-10-CM

## 2021-05-25 DIAGNOSIS — M25.571 SINUS TARSI SYNDROME OF RIGHT FOOT: ICD-10-CM

## 2021-05-25 DIAGNOSIS — M25.571 SINUS TARSITIS OF RIGHT FOOT: ICD-10-CM

## 2021-05-25 PROCEDURE — 36000704 HC OR TIME LEV I 1ST 15 MIN: Performed by: PODIATRIST

## 2021-05-25 PROCEDURE — 71000033 HC RECOVERY, INTIAL HOUR: Performed by: PODIATRIST

## 2021-05-25 PROCEDURE — 25000003 PHARM REV CODE 250: Performed by: PODIATRIST

## 2021-05-25 PROCEDURE — 36000705 HC OR TIME LEV I EA ADD 15 MIN: Performed by: PODIATRIST

## 2021-05-25 PROCEDURE — 63600175 PHARM REV CODE 636 W HCPCS: Performed by: NURSE ANESTHETIST, CERTIFIED REGISTERED

## 2021-05-25 PROCEDURE — 71000015 HC POSTOP RECOV 1ST HR: Performed by: PODIATRIST

## 2021-05-25 PROCEDURE — 20550 NJX 1 TENDON SHEATH/LIGAMENT: CPT | Mod: 59,RT,, | Performed by: PODIATRIST

## 2021-05-25 PROCEDURE — 63600175 PHARM REV CODE 636 W HCPCS: Performed by: PODIATRIST

## 2021-05-25 PROCEDURE — 37000009 HC ANESTHESIA EA ADD 15 MINS: Performed by: PODIATRIST

## 2021-05-25 PROCEDURE — 20550 PR INJECT TENDON SHEATH/LIGAMENT: ICD-10-PCS | Mod: 59,RT,, | Performed by: PODIATRIST

## 2021-05-25 PROCEDURE — D9220A PRA ANESTHESIA: ICD-10-PCS | Mod: ,,, | Performed by: ANESTHESIOLOGY

## 2021-05-25 PROCEDURE — 37000008 HC ANESTHESIA 1ST 15 MINUTES: Performed by: PODIATRIST

## 2021-05-25 PROCEDURE — 20605 DRAIN/INJ JOINT/BURSA W/O US: CPT | Mod: RT,,, | Performed by: PODIATRIST

## 2021-05-25 PROCEDURE — D9220A PRA ANESTHESIA: Mod: ,,, | Performed by: ANESTHESIOLOGY

## 2021-05-25 PROCEDURE — 20605 PR DRAIN/INJECT INTERMEDIATE JOINT/BURSA: ICD-10-PCS | Mod: RT,,, | Performed by: PODIATRIST

## 2021-05-25 RX ORDER — CEFAZOLIN SODIUM 1 G/50ML
1 SOLUTION INTRAVENOUS
Status: COMPLETED | OUTPATIENT
Start: 2021-05-25 | End: 2021-05-25

## 2021-05-25 RX ORDER — PROPOFOL 10 MG/ML
VIAL (ML) INTRAVENOUS
Status: DISCONTINUED | OUTPATIENT
Start: 2021-05-25 | End: 2021-05-25

## 2021-05-25 RX ORDER — BUPIVACAINE HYDROCHLORIDE 5 MG/ML
INJECTION, SOLUTION EPIDURAL; INTRACAUDAL
Status: DISCONTINUED | OUTPATIENT
Start: 2021-05-25 | End: 2021-05-25 | Stop reason: HOSPADM

## 2021-05-25 RX ORDER — MEPERIDINE HYDROCHLORIDE 50 MG/ML
INJECTION INTRAMUSCULAR; INTRAVENOUS; SUBCUTANEOUS
Status: DISCONTINUED | OUTPATIENT
Start: 2021-05-25 | End: 2021-05-25

## 2021-05-25 RX ORDER — DEXAMETHASONE SODIUM PHOSPHATE 4 MG/ML
INJECTION, SOLUTION INTRA-ARTICULAR; INTRALESIONAL; INTRAMUSCULAR; INTRAVENOUS; SOFT TISSUE
Status: DISCONTINUED | OUTPATIENT
Start: 2021-05-25 | End: 2021-05-25 | Stop reason: HOSPADM

## 2021-05-25 RX ORDER — MIDAZOLAM HYDROCHLORIDE 1 MG/ML
INJECTION INTRAMUSCULAR; INTRAVENOUS
Status: DISCONTINUED | OUTPATIENT
Start: 2021-05-25 | End: 2021-05-25

## 2021-05-25 RX ORDER — LIDOCAINE HYDROCHLORIDE 10 MG/ML
INJECTION INFILTRATION; PERINEURAL
Status: DISCONTINUED | OUTPATIENT
Start: 2021-05-25 | End: 2021-05-25 | Stop reason: HOSPADM

## 2021-05-25 RX ORDER — METHYLPREDNISOLONE ACETATE 80 MG/ML
INJECTION, SUSPENSION INTRA-ARTICULAR; INTRALESIONAL; INTRAMUSCULAR; SOFT TISSUE
Status: DISCONTINUED | OUTPATIENT
Start: 2021-05-25 | End: 2021-05-25 | Stop reason: HOSPADM

## 2021-05-25 RX ORDER — SODIUM CHLORIDE, SODIUM LACTATE, POTASSIUM CHLORIDE, CALCIUM CHLORIDE 600; 310; 30; 20 MG/100ML; MG/100ML; MG/100ML; MG/100ML
INJECTION, SOLUTION INTRAVENOUS CONTINUOUS
Status: DISCONTINUED | OUTPATIENT
Start: 2021-05-25 | End: 2021-05-25 | Stop reason: HOSPADM

## 2021-05-25 RX ORDER — KETOROLAC TROMETHAMINE 30 MG/ML
INJECTION, SOLUTION INTRAMUSCULAR; INTRAVENOUS
Status: DISCONTINUED
Start: 2021-05-25 | End: 2021-05-25 | Stop reason: HOSPADM

## 2021-05-25 RX ORDER — KETOROLAC TROMETHAMINE 30 MG/ML
INJECTION, SOLUTION INTRAMUSCULAR; INTRAVENOUS
Status: DISCONTINUED | OUTPATIENT
Start: 2021-05-25 | End: 2021-05-25 | Stop reason: HOSPADM

## 2021-05-25 RX ADMIN — CEFAZOLIN SODIUM 1 G: 1 SOLUTION INTRAVENOUS at 09:05

## 2021-05-25 RX ADMIN — MIDAZOLAM HYDROCHLORIDE 2 MG: 1 INJECTION, SOLUTION INTRAMUSCULAR; INTRAVENOUS at 09:05

## 2021-05-25 RX ADMIN — SODIUM CHLORIDE, SODIUM LACTATE, POTASSIUM CHLORIDE, AND CALCIUM CHLORIDE: .6; .31; .03; .02 INJECTION, SOLUTION INTRAVENOUS at 08:05

## 2021-05-25 RX ADMIN — PROPOFOL 100 MG: 10 INJECTION, EMULSION INTRAVENOUS at 09:05

## 2021-05-25 RX ADMIN — MEPERIDINE HYDROCHLORIDE 50 MG: 50 INJECTION INTRAMUSCULAR; INTRAVENOUS; SUBCUTANEOUS at 09:05

## 2021-06-08 ENCOUNTER — OFFICE VISIT (OUTPATIENT)
Dept: PODIATRY | Facility: CLINIC | Age: 46
End: 2021-06-08
Payer: MEDICARE

## 2021-06-08 VITALS
HEIGHT: 75 IN | HEART RATE: 93 BPM | BODY MASS INDEX: 24 KG/M2 | TEMPERATURE: 97 F | SYSTOLIC BLOOD PRESSURE: 135 MMHG | DIASTOLIC BLOOD PRESSURE: 93 MMHG | WEIGHT: 193 LBS

## 2021-06-08 DIAGNOSIS — G57.51 TARSAL TUNNEL SYNDROME OF RIGHT SIDE: ICD-10-CM

## 2021-06-08 DIAGNOSIS — M79.671 FOOT PAIN, RIGHT: ICD-10-CM

## 2021-06-08 DIAGNOSIS — M79.2 NEURITIS: ICD-10-CM

## 2021-06-08 DIAGNOSIS — G35 MS (MULTIPLE SCLEROSIS): ICD-10-CM

## 2021-06-08 DIAGNOSIS — M25.571 SINUS TARSITIS OF RIGHT FOOT: Primary | ICD-10-CM

## 2021-06-08 PROCEDURE — 99213 OFFICE O/P EST LOW 20 MIN: CPT | Mod: S$PBB,,, | Performed by: PODIATRIST

## 2021-06-08 PROCEDURE — 99999 PR PBB SHADOW E&M-EST. PATIENT-LVL IV: CPT | Mod: PBBFAC,,, | Performed by: PODIATRIST

## 2021-06-08 PROCEDURE — 99999 PR PBB SHADOW E&M-EST. PATIENT-LVL IV: ICD-10-PCS | Mod: PBBFAC,,, | Performed by: PODIATRIST

## 2021-06-08 PROCEDURE — 99214 OFFICE O/P EST MOD 30 MIN: CPT | Mod: PBBFAC,PN | Performed by: PODIATRIST

## 2021-06-08 PROCEDURE — 99213 PR OFFICE/OUTPT VISIT, EST, LEVL III, 20-29 MIN: ICD-10-PCS | Mod: S$PBB,,, | Performed by: PODIATRIST

## 2021-06-08 RX ORDER — DICLOFENAC SODIUM 75 MG/1
TABLET, DELAYED RELEASE ORAL
COMMUNITY
Start: 2021-05-19 | End: 2021-09-15 | Stop reason: SDUPTHER

## 2021-06-21 ENCOUNTER — TELEPHONE (OUTPATIENT)
Dept: PODIATRY | Facility: CLINIC | Age: 46
End: 2021-06-21

## 2021-06-23 ENCOUNTER — TELEPHONE (OUTPATIENT)
Dept: PODIATRY | Facility: CLINIC | Age: 46
End: 2021-06-23

## 2021-06-23 RX ORDER — OXYCODONE AND ACETAMINOPHEN 10; 325 MG/1; MG/1
1 TABLET ORAL 3 TIMES DAILY
Qty: 90 TABLET | Refills: 0 | Status: SHIPPED | OUTPATIENT
Start: 2021-06-23 | End: 2021-07-08 | Stop reason: SDUPTHER

## 2021-07-08 ENCOUNTER — TELEPHONE (OUTPATIENT)
Dept: PODIATRY | Facility: CLINIC | Age: 46
End: 2021-07-08

## 2021-07-08 ENCOUNTER — OFFICE VISIT (OUTPATIENT)
Dept: PODIATRY | Facility: CLINIC | Age: 46
End: 2021-07-08
Payer: MEDICARE

## 2021-07-08 ENCOUNTER — HOSPITAL ENCOUNTER (OUTPATIENT)
Dept: RADIOLOGY | Facility: HOSPITAL | Age: 46
Discharge: HOME | End: 2021-07-08
Attending: PODIATRIST
Payer: MEDICARE

## 2021-07-08 VITALS
WEIGHT: 195 LBS | HEART RATE: 77 BPM | DIASTOLIC BLOOD PRESSURE: 85 MMHG | BODY MASS INDEX: 26.41 KG/M2 | SYSTOLIC BLOOD PRESSURE: 131 MMHG | TEMPERATURE: 99 F | HEIGHT: 72 IN

## 2021-07-08 DIAGNOSIS — M76.70 PERONEAL TENDONITIS, UNSPECIFIED LATERALITY: Primary | ICD-10-CM

## 2021-07-08 DIAGNOSIS — M19.071 PRIMARY OSTEOARTHRITIS OF RIGHT FOOT: ICD-10-CM

## 2021-07-08 PROCEDURE — 99999 PR PBB SHADOW E&M-EST. PATIENT-LVL IV: ICD-10-PCS | Mod: PBBFAC,,, | Performed by: PODIATRIST

## 2021-07-08 PROCEDURE — 99999 PR PBB SHADOW E&M-EST. PATIENT-LVL IV: CPT | Mod: PBBFAC,,, | Performed by: PODIATRIST

## 2021-07-08 PROCEDURE — 99214 PR OFFICE/OUTPT VISIT, EST, LEVL IV, 30-39 MIN: ICD-10-PCS | Mod: S$PBB,,, | Performed by: PODIATRIST

## 2021-07-08 PROCEDURE — 99214 OFFICE O/P EST MOD 30 MIN: CPT | Mod: S$PBB,,, | Performed by: PODIATRIST

## 2021-07-08 PROCEDURE — 99214 OFFICE O/P EST MOD 30 MIN: CPT | Mod: PBBFAC,PN | Performed by: PODIATRIST

## 2021-07-08 PROCEDURE — 73630 X-RAY EXAM OF FOOT: CPT | Mod: 26,RT,, | Performed by: RADIOLOGY

## 2021-07-08 PROCEDURE — 73630 X-RAY EXAM OF FOOT: CPT | Mod: TC,FY,RT

## 2021-07-08 PROCEDURE — 73630 XR FOOT COMPLETE 3 VIEW RIGHT: ICD-10-PCS | Mod: 26,RT,, | Performed by: RADIOLOGY

## 2021-07-08 RX ORDER — BUSPIRONE HYDROCHLORIDE 10 MG/1
TABLET ORAL
COMMUNITY
Start: 2021-02-09 | End: 2021-07-08 | Stop reason: SDUPTHER

## 2021-07-08 RX ORDER — CHOLECALCIFEROL (VITAMIN D3) 25 MCG
TABLET ORAL
COMMUNITY
Start: 2021-03-24 | End: 2021-07-08 | Stop reason: SDUPTHER

## 2021-07-08 RX ORDER — OXYCODONE AND ACETAMINOPHEN 10; 325 MG/1; MG/1
1 TABLET ORAL 3 TIMES DAILY
Qty: 90 TABLET | Refills: 0 | Status: SHIPPED | OUTPATIENT
Start: 2021-07-08 | End: 2021-08-07

## 2021-07-08 RX ORDER — LIDOCAINE 50 MG/G
OINTMENT TOPICAL
COMMUNITY
Start: 2020-08-25 | End: 2021-07-08 | Stop reason: SDUPTHER

## 2021-07-08 RX ORDER — TERIFLUNOMIDE 14 MG/1
14 TABLET, FILM COATED ORAL
COMMUNITY
Start: 2021-01-25 | End: 2021-07-21

## 2021-07-08 RX ORDER — GEMFIBROZIL 600 MG/1
TABLET, FILM COATED ORAL
COMMUNITY
Start: 2021-03-24 | End: 2021-07-08 | Stop reason: SDUPTHER

## 2021-07-08 RX ORDER — MODAFINIL 200 MG/1
200 TABLET ORAL
COMMUNITY
Start: 2021-07-06 | End: 2021-07-08 | Stop reason: SDUPTHER

## 2021-07-13 ENCOUNTER — TELEPHONE (OUTPATIENT)
Dept: PODIATRY | Facility: CLINIC | Age: 46
End: 2021-07-13

## 2021-07-21 ENCOUNTER — OFFICE VISIT (OUTPATIENT)
Dept: PODIATRY | Facility: CLINIC | Age: 46
End: 2021-07-21
Payer: MEDICARE

## 2021-07-21 VITALS
BODY MASS INDEX: 26.28 KG/M2 | SYSTOLIC BLOOD PRESSURE: 119 MMHG | HEART RATE: 66 BPM | HEIGHT: 72 IN | DIASTOLIC BLOOD PRESSURE: 81 MMHG | WEIGHT: 194 LBS | RESPIRATION RATE: 18 BRPM

## 2021-07-21 DIAGNOSIS — G35 MS (MULTIPLE SCLEROSIS): ICD-10-CM

## 2021-07-21 DIAGNOSIS — M76.70 PERONEAL TENDONITIS, UNSPECIFIED LATERALITY: Primary | ICD-10-CM

## 2021-07-21 DIAGNOSIS — M19.071 PRIMARY OSTEOARTHRITIS OF RIGHT FOOT: ICD-10-CM

## 2021-07-21 PROCEDURE — 99999 PR PBB SHADOW E&M-EST. PATIENT-LVL IV: CPT | Mod: PBBFAC,,, | Performed by: PODIATRIST

## 2021-07-21 PROCEDURE — 99214 OFFICE O/P EST MOD 30 MIN: CPT | Mod: PBBFAC | Performed by: PODIATRIST

## 2021-07-21 PROCEDURE — 99214 PR OFFICE/OUTPT VISIT, EST, LEVL IV, 30-39 MIN: ICD-10-PCS | Mod: S$PBB,,, | Performed by: PODIATRIST

## 2021-07-21 PROCEDURE — 99214 OFFICE O/P EST MOD 30 MIN: CPT | Mod: S$PBB,,, | Performed by: PODIATRIST

## 2021-07-21 PROCEDURE — 99999 PR PBB SHADOW E&M-EST. PATIENT-LVL IV: ICD-10-PCS | Mod: PBBFAC,,, | Performed by: PODIATRIST

## 2021-07-21 RX ORDER — PREGABALIN 150 MG/1
150 CAPSULE ORAL 3 TIMES DAILY
Qty: 90 CAPSULE | Refills: 4 | Status: SHIPPED | OUTPATIENT
Start: 2021-07-21 | End: 2021-08-20

## 2021-07-26 ENCOUNTER — TELEPHONE (OUTPATIENT)
Dept: PODIATRY | Facility: CLINIC | Age: 46
End: 2021-07-26

## 2021-08-09 ENCOUNTER — TELEPHONE (OUTPATIENT)
Dept: PODIATRY | Facility: CLINIC | Age: 46
End: 2021-08-09

## 2021-08-09 RX ORDER — OXYCODONE AND ACETAMINOPHEN 10; 325 MG/1; MG/1
1 TABLET ORAL 3 TIMES DAILY
Qty: 90 TABLET | Refills: 0 | Status: SHIPPED | OUTPATIENT
Start: 2021-08-09 | End: 2021-09-07 | Stop reason: SDUPTHER

## 2021-08-18 ENCOUNTER — LAB VISIT (OUTPATIENT)
Dept: LAB | Facility: HOSPITAL | Age: 46
End: 2021-08-18
Attending: PODIATRIST
Payer: MEDICARE

## 2021-08-18 ENCOUNTER — TELEPHONE (OUTPATIENT)
Dept: PODIATRY | Facility: CLINIC | Age: 46
End: 2021-08-18

## 2021-08-18 ENCOUNTER — OFFICE VISIT (OUTPATIENT)
Dept: PODIATRY | Facility: CLINIC | Age: 46
End: 2021-08-18
Payer: MEDICARE

## 2021-08-18 VITALS
HEART RATE: 85 BPM | BODY MASS INDEX: 26.14 KG/M2 | TEMPERATURE: 98 F | HEIGHT: 72 IN | WEIGHT: 193 LBS | DIASTOLIC BLOOD PRESSURE: 74 MMHG | RESPIRATION RATE: 18 BRPM | SYSTOLIC BLOOD PRESSURE: 126 MMHG

## 2021-08-18 DIAGNOSIS — G57.51 TARSAL TUNNEL SYNDROME OF RIGHT SIDE: ICD-10-CM

## 2021-08-18 DIAGNOSIS — M79.2 NEURITIS: ICD-10-CM

## 2021-08-18 DIAGNOSIS — M72.2 PLANTAR FASCIITIS: ICD-10-CM

## 2021-08-18 DIAGNOSIS — M79.2 NEURITIS: Primary | ICD-10-CM

## 2021-08-18 DIAGNOSIS — G35 MS (MULTIPLE SCLEROSIS): ICD-10-CM

## 2021-08-18 DIAGNOSIS — M79.671 FOOT PAIN, RIGHT: ICD-10-CM

## 2021-08-18 LAB
AMPHET+METHAMPHET UR QL: NEGATIVE
BARBITURATES UR QL SCN>200 NG/ML: NEGATIVE
BENZODIAZ UR QL SCN>200 NG/ML: NEGATIVE
BZE UR QL SCN: NEGATIVE
CANNABINOIDS UR QL SCN: NEGATIVE
CREAT UR-MCNC: >450 MG/DL (ref 23–375)
METHADONE UR QL SCN>300 NG/ML: NEGATIVE
OPIATES UR QL SCN: ABNORMAL
PCP UR QL SCN>25 NG/ML: NEGATIVE
TOXICOLOGY INFORMATION: ABNORMAL

## 2021-08-18 PROCEDURE — 99999 PR PBB SHADOW E&M-EST. PATIENT-LVL IV: CPT | Mod: PBBFAC,,, | Performed by: PODIATRIST

## 2021-08-18 PROCEDURE — 80307 DRUG TEST PRSMV CHEM ANLYZR: CPT | Performed by: PODIATRIST

## 2021-08-18 PROCEDURE — 99214 OFFICE O/P EST MOD 30 MIN: CPT | Mod: PBBFAC | Performed by: PODIATRIST

## 2021-08-18 PROCEDURE — 99213 PR OFFICE/OUTPT VISIT, EST, LEVL III, 20-29 MIN: ICD-10-PCS | Mod: S$PBB,,, | Performed by: PODIATRIST

## 2021-08-18 PROCEDURE — 99999 PR PBB SHADOW E&M-EST. PATIENT-LVL IV: ICD-10-PCS | Mod: PBBFAC,,, | Performed by: PODIATRIST

## 2021-08-18 PROCEDURE — 99213 OFFICE O/P EST LOW 20 MIN: CPT | Mod: S$PBB,,, | Performed by: PODIATRIST

## 2021-08-18 RX ORDER — QUETIAPINE FUMARATE 50 MG/1
TABLET, FILM COATED ORAL
COMMUNITY
Start: 2021-06-30 | End: 2021-09-23

## 2021-09-07 ENCOUNTER — TELEPHONE (OUTPATIENT)
Dept: PODIATRY | Facility: CLINIC | Age: 46
End: 2021-09-07

## 2021-09-07 RX ORDER — DICLOFENAC SODIUM 75 MG/1
TABLET, DELAYED RELEASE ORAL
Status: CANCELLED | OUTPATIENT
Start: 2021-09-07

## 2021-09-07 RX ORDER — OXYCODONE AND ACETAMINOPHEN 10; 325 MG/1; MG/1
1 TABLET ORAL 3 TIMES DAILY
Qty: 90 TABLET | Refills: 0 | Status: CANCELLED | OUTPATIENT
Start: 2021-09-07 | End: 2021-10-07

## 2021-09-07 RX ORDER — OXYCODONE AND ACETAMINOPHEN 10; 325 MG/1; MG/1
1 TABLET ORAL 3 TIMES DAILY
Qty: 90 TABLET | Refills: 0 | Status: SHIPPED | OUTPATIENT
Start: 2021-09-07 | End: 2021-10-06 | Stop reason: SDUPTHER

## 2021-09-15 ENCOUNTER — OFFICE VISIT (OUTPATIENT)
Dept: PODIATRY | Facility: CLINIC | Age: 46
End: 2021-09-15
Payer: MEDICARE

## 2021-09-15 VITALS
BODY MASS INDEX: 26.14 KG/M2 | DIASTOLIC BLOOD PRESSURE: 76 MMHG | SYSTOLIC BLOOD PRESSURE: 145 MMHG | HEIGHT: 72 IN | WEIGHT: 193 LBS | TEMPERATURE: 97 F | HEART RATE: 98 BPM

## 2021-09-15 DIAGNOSIS — M76.70 PERONEAL TENDONITIS, UNSPECIFIED LATERALITY: Primary | ICD-10-CM

## 2021-09-15 DIAGNOSIS — M79.671 FOOT PAIN, RIGHT: ICD-10-CM

## 2021-09-15 DIAGNOSIS — S93.311A CUBOID SYNDROME OF RIGHT FOOT: ICD-10-CM

## 2021-09-15 DIAGNOSIS — G35 MS (MULTIPLE SCLEROSIS): ICD-10-CM

## 2021-09-15 DIAGNOSIS — M79.2 NEURITIS: ICD-10-CM

## 2021-09-15 PROCEDURE — 99213 PR OFFICE/OUTPT VISIT, EST, LEVL III, 20-29 MIN: ICD-10-PCS | Mod: S$PBB,,, | Performed by: PODIATRIST

## 2021-09-15 PROCEDURE — 99213 OFFICE O/P EST LOW 20 MIN: CPT | Mod: S$PBB,,, | Performed by: PODIATRIST

## 2021-09-15 PROCEDURE — 99215 OFFICE O/P EST HI 40 MIN: CPT | Mod: PBBFAC | Performed by: PODIATRIST

## 2021-09-15 PROCEDURE — 99999 PR PBB SHADOW E&M-EST. PATIENT-LVL V: CPT | Mod: PBBFAC,,, | Performed by: PODIATRIST

## 2021-09-15 PROCEDURE — 99999 PR PBB SHADOW E&M-EST. PATIENT-LVL V: ICD-10-PCS | Mod: PBBFAC,,, | Performed by: PODIATRIST

## 2021-09-15 RX ORDER — ARMODAFINIL 150 MG/1
150 TABLET ORAL DAILY
COMMUNITY
Start: 2021-09-14 | End: 2022-09-13

## 2021-09-15 RX ORDER — DICLOFENAC SODIUM 75 MG/1
75 TABLET, DELAYED RELEASE ORAL 2 TIMES DAILY
Qty: 60 TABLET | Refills: 3 | Status: SHIPPED | OUTPATIENT
Start: 2021-09-15 | End: 2021-10-15

## 2021-09-23 ENCOUNTER — OFFICE VISIT (OUTPATIENT)
Dept: PODIATRY | Facility: CLINIC | Age: 46
End: 2021-09-23
Payer: MEDICARE

## 2021-09-23 VITALS
WEIGHT: 190 LBS | BODY MASS INDEX: 25.73 KG/M2 | TEMPERATURE: 97 F | DIASTOLIC BLOOD PRESSURE: 79 MMHG | SYSTOLIC BLOOD PRESSURE: 127 MMHG | HEART RATE: 73 BPM | HEIGHT: 72 IN | RESPIRATION RATE: 18 BRPM

## 2021-09-23 DIAGNOSIS — Z01.818 PRE-OP EXAM: ICD-10-CM

## 2021-09-23 DIAGNOSIS — S93.311A CUBOID SYNDROME OF RIGHT FOOT: ICD-10-CM

## 2021-09-23 DIAGNOSIS — Z01.818 PRE-OP TESTING: ICD-10-CM

## 2021-09-23 DIAGNOSIS — M76.70 PERONEAL TENDONITIS, UNSPECIFIED LATERALITY: ICD-10-CM

## 2021-09-23 DIAGNOSIS — M19.071 PRIMARY OSTEOARTHRITIS OF RIGHT FOOT: Primary | ICD-10-CM

## 2021-09-23 PROCEDURE — 99999 PR PBB SHADOW E&M-EST. PATIENT-LVL IV: CPT | Mod: PBBFAC,,, | Performed by: PODIATRIST

## 2021-09-23 PROCEDURE — 99214 OFFICE O/P EST MOD 30 MIN: CPT | Mod: PBBFAC,PN | Performed by: PODIATRIST

## 2021-09-23 PROCEDURE — 99214 OFFICE O/P EST MOD 30 MIN: CPT | Mod: S$PBB,,, | Performed by: PODIATRIST

## 2021-09-23 PROCEDURE — 99214 PR OFFICE/OUTPT VISIT, EST, LEVL IV, 30-39 MIN: ICD-10-PCS | Mod: S$PBB,,, | Performed by: PODIATRIST

## 2021-09-23 PROCEDURE — 99999 PR PBB SHADOW E&M-EST. PATIENT-LVL IV: ICD-10-PCS | Mod: PBBFAC,,, | Performed by: PODIATRIST

## 2021-09-23 RX ORDER — MODAFINIL 200 MG/1
TABLET ORAL
COMMUNITY
Start: 2021-08-24 | End: 2021-09-23

## 2021-09-23 RX ORDER — PREGABALIN 150 MG/1
150 CAPSULE ORAL
COMMUNITY
Start: 2021-08-24 | End: 2021-12-13 | Stop reason: SDUPTHER

## 2021-09-23 RX ORDER — QUETIAPINE FUMARATE 25 MG/1
TABLET, FILM COATED ORAL
COMMUNITY
Start: 2021-08-13 | End: 2022-06-06 | Stop reason: SDUPTHER

## 2021-09-24 ENCOUNTER — LAB VISIT (OUTPATIENT)
Dept: LAB | Facility: HOSPITAL | Age: 46
End: 2021-09-24
Attending: PODIATRIST
Payer: MEDICARE

## 2021-09-24 DIAGNOSIS — S93.311A CUBOID SYNDROME OF RIGHT FOOT: ICD-10-CM

## 2021-09-24 DIAGNOSIS — Z01.818 PRE-OP EXAM: ICD-10-CM

## 2021-09-24 DIAGNOSIS — M19.071 PRIMARY OSTEOARTHRITIS OF RIGHT FOOT: ICD-10-CM

## 2021-09-24 LAB
ALBUMIN SERPL BCP-MCNC: 3.7 G/DL (ref 3.5–5.2)
ALP SERPL-CCNC: 113 U/L (ref 55–135)
ALT SERPL W/O P-5'-P-CCNC: 28 U/L (ref 10–44)
ANION GAP SERPL CALC-SCNC: 10 MMOL/L (ref 8–16)
AST SERPL-CCNC: 28 U/L (ref 10–40)
BASOPHILS # BLD AUTO: 0.04 K/UL (ref 0–0.2)
BASOPHILS NFR BLD: 1.1 % (ref 0–1.9)
BILIRUB SERPL-MCNC: 0.4 MG/DL (ref 0.1–1)
BUN SERPL-MCNC: 18 MG/DL (ref 6–20)
CALCIUM SERPL-MCNC: 9.2 MG/DL (ref 8.7–10.5)
CHLORIDE SERPL-SCNC: 104 MMOL/L (ref 95–110)
CO2 SERPL-SCNC: 24 MMOL/L (ref 23–29)
CREAT SERPL-MCNC: 1.3 MG/DL (ref 0.5–1.4)
DIFFERENTIAL METHOD: ABNORMAL
EOSINOPHIL # BLD AUTO: 0.1 K/UL (ref 0–0.5)
EOSINOPHIL NFR BLD: 1.6 % (ref 0–8)
ERYTHROCYTE [DISTWIDTH] IN BLOOD BY AUTOMATED COUNT: 13.3 % (ref 11.5–14.5)
EST. GFR  (AFRICAN AMERICAN): >60 ML/MIN/1.73 M^2
EST. GFR  (NON AFRICAN AMERICAN): >60 ML/MIN/1.73 M^2
GLUCOSE SERPL-MCNC: 122 MG/DL (ref 70–110)
HCT VFR BLD AUTO: 40.9 % (ref 40–54)
HGB BLD-MCNC: 13.7 G/DL (ref 14–18)
IMM GRANULOCYTES # BLD AUTO: 0.01 K/UL (ref 0–0.04)
IMM GRANULOCYTES NFR BLD AUTO: 0.3 % (ref 0–0.5)
LYMPHOCYTES # BLD AUTO: 1.7 K/UL (ref 1–4.8)
LYMPHOCYTES NFR BLD: 47.5 % (ref 18–48)
MCH RBC QN AUTO: 30.4 PG (ref 27–31)
MCHC RBC AUTO-ENTMCNC: 33.5 G/DL (ref 32–36)
MCV RBC AUTO: 91 FL (ref 82–98)
MONOCYTES # BLD AUTO: 0.3 K/UL (ref 0.3–1)
MONOCYTES NFR BLD: 9.3 % (ref 4–15)
NEUTROPHILS # BLD AUTO: 1.5 K/UL (ref 1.8–7.7)
NEUTROPHILS NFR BLD: 40.2 % (ref 38–73)
NRBC BLD-RTO: 0 /100 WBC
PLATELET # BLD AUTO: 210 K/UL (ref 150–450)
PMV BLD AUTO: 10.9 FL (ref 9.2–12.9)
POTASSIUM SERPL-SCNC: 4.2 MMOL/L (ref 3.5–5.1)
PROT SERPL-MCNC: 7.2 G/DL (ref 6–8.4)
RBC # BLD AUTO: 4.5 M/UL (ref 4.6–6.2)
SODIUM SERPL-SCNC: 138 MMOL/L (ref 136–145)
WBC # BLD AUTO: 3.66 K/UL (ref 3.9–12.7)

## 2021-09-24 PROCEDURE — 85025 COMPLETE CBC W/AUTO DIFF WBC: CPT | Performed by: PODIATRIST

## 2021-09-24 PROCEDURE — 80053 COMPREHEN METABOLIC PANEL: CPT | Performed by: PODIATRIST

## 2021-09-24 PROCEDURE — 36415 COLL VENOUS BLD VENIPUNCTURE: CPT | Performed by: PODIATRIST

## 2021-09-25 ENCOUNTER — LAB VISIT (OUTPATIENT)
Dept: FAMILY MEDICINE | Facility: CLINIC | Age: 46
End: 2021-09-25
Payer: MEDICARE

## 2021-09-25 DIAGNOSIS — Z01.818 PRE-OP TESTING: ICD-10-CM

## 2021-09-25 PROCEDURE — U0005 INFEC AGEN DETEC AMPLI PROBE: HCPCS | Performed by: PODIATRIST

## 2021-09-25 PROCEDURE — U0003 INFECTIOUS AGENT DETECTION BY NUCLEIC ACID (DNA OR RNA); SEVERE ACUTE RESPIRATORY SYNDROME CORONAVIRUS 2 (SARS-COV-2) (CORONAVIRUS DISEASE [COVID-19]), AMPLIFIED PROBE TECHNIQUE, MAKING USE OF HIGH THROUGHPUT TECHNOLOGIES AS DESCRIBED BY CMS-2020-01-R: HCPCS | Performed by: PODIATRIST

## 2021-09-26 LAB
SARS-COV-2 RNA RESP QL NAA+PROBE: NOT DETECTED
SARS-COV-2- CYCLE NUMBER: NORMAL

## 2021-09-26 RX ORDER — SODIUM CHLORIDE, SODIUM LACTATE, POTASSIUM CHLORIDE, CALCIUM CHLORIDE 600; 310; 30; 20 MG/100ML; MG/100ML; MG/100ML; MG/100ML
INJECTION, SOLUTION INTRAVENOUS CONTINUOUS
Status: CANCELLED | OUTPATIENT
Start: 2021-09-28

## 2021-09-28 ENCOUNTER — HOSPITAL ENCOUNTER (OUTPATIENT)
Facility: HOSPITAL | Age: 46
Discharge: HOME | End: 2021-09-28
Attending: PODIATRIST | Admitting: PODIATRIST
Payer: MEDICARE

## 2021-09-28 ENCOUNTER — ANESTHESIA EVENT (OUTPATIENT)
Dept: SURGERY | Facility: HOSPITAL | Age: 46
End: 2021-09-28
Payer: MEDICARE

## 2021-09-28 ENCOUNTER — ANESTHESIA (OUTPATIENT)
Dept: SURGERY | Facility: HOSPITAL | Age: 46
End: 2021-09-28
Payer: MEDICARE

## 2021-09-28 VITALS
SYSTOLIC BLOOD PRESSURE: 128 MMHG | RESPIRATION RATE: 17 BRPM | HEIGHT: 72 IN | OXYGEN SATURATION: 98 % | HEART RATE: 70 BPM | DIASTOLIC BLOOD PRESSURE: 84 MMHG | BODY MASS INDEX: 26.41 KG/M2 | WEIGHT: 195 LBS | TEMPERATURE: 97 F

## 2021-09-28 DIAGNOSIS — M79.671 CHRONIC PAIN IN RIGHT FOOT: ICD-10-CM

## 2021-09-28 DIAGNOSIS — S93.311A CUBOID SYNDROME OF RIGHT FOOT: ICD-10-CM

## 2021-09-28 DIAGNOSIS — G89.29 CHRONIC PAIN OF RIGHT ANKLE: ICD-10-CM

## 2021-09-28 DIAGNOSIS — M25.571 CHRONIC PAIN OF RIGHT ANKLE: ICD-10-CM

## 2021-09-28 DIAGNOSIS — G89.29 CHRONIC PAIN IN RIGHT FOOT: ICD-10-CM

## 2021-09-28 DIAGNOSIS — M19.071 PRIMARY OSTEOARTHRITIS OF RIGHT FOOT: Primary | ICD-10-CM

## 2021-09-28 DIAGNOSIS — M76.70 PERONEAL TENDONITIS, UNSPECIFIED LATERALITY: ICD-10-CM

## 2021-09-28 PROCEDURE — 71000015 HC POSTOP RECOV 1ST HR: Performed by: PODIATRIST

## 2021-09-28 PROCEDURE — 20605 PR DRAIN/INJECT INTERMEDIATE JOINT/BURSA: ICD-10-PCS | Mod: RT,,, | Performed by: PODIATRIST

## 2021-09-28 PROCEDURE — 20550 NJX 1 TENDON SHEATH/LIGAMENT: CPT | Mod: 59,RT,, | Performed by: PODIATRIST

## 2021-09-28 PROCEDURE — D9220A PRA ANESTHESIA: ICD-10-PCS | Mod: ,,, | Performed by: ANESTHESIOLOGY

## 2021-09-28 PROCEDURE — 37000008 HC ANESTHESIA 1ST 15 MINUTES: Performed by: PODIATRIST

## 2021-09-28 PROCEDURE — 20550 PR INJECT TENDON SHEATH/LIGAMENT: ICD-10-PCS | Mod: 59,RT,, | Performed by: PODIATRIST

## 2021-09-28 PROCEDURE — 63600175 PHARM REV CODE 636 W HCPCS: Performed by: NURSE ANESTHETIST, CERTIFIED REGISTERED

## 2021-09-28 PROCEDURE — 25000003 PHARM REV CODE 250: Performed by: NURSE ANESTHETIST, CERTIFIED REGISTERED

## 2021-09-28 PROCEDURE — 63600175 PHARM REV CODE 636 W HCPCS: Performed by: ANESTHESIOLOGY

## 2021-09-28 PROCEDURE — 36000704 HC OR TIME LEV I 1ST 15 MIN: Performed by: PODIATRIST

## 2021-09-28 PROCEDURE — 25000003 PHARM REV CODE 250: Performed by: PODIATRIST

## 2021-09-28 PROCEDURE — 20605 DRAIN/INJ JOINT/BURSA W/O US: CPT | Mod: RT,,, | Performed by: PODIATRIST

## 2021-09-28 PROCEDURE — 71000033 HC RECOVERY, INTIAL HOUR: Performed by: PODIATRIST

## 2021-09-28 PROCEDURE — 63600175 PHARM REV CODE 636 W HCPCS: Performed by: PODIATRIST

## 2021-09-28 PROCEDURE — D9220A PRA ANESTHESIA: Mod: ,,, | Performed by: ANESTHESIOLOGY

## 2021-09-28 RX ORDER — KETOROLAC TROMETHAMINE 30 MG/ML
INJECTION, SOLUTION INTRAMUSCULAR; INTRAVENOUS
Status: DISCONTINUED | OUTPATIENT
Start: 2021-09-28 | End: 2021-09-28 | Stop reason: HOSPADM

## 2021-09-28 RX ORDER — LIDOCAINE HYDROCHLORIDE 10 MG/ML
INJECTION INFILTRATION; PERINEURAL
Status: DISCONTINUED | OUTPATIENT
Start: 2021-09-28 | End: 2021-09-28 | Stop reason: HOSPADM

## 2021-09-28 RX ORDER — METHYLPREDNISOLONE ACETATE 80 MG/ML
INJECTION, SUSPENSION INTRA-ARTICULAR; INTRALESIONAL; INTRAMUSCULAR; SOFT TISSUE
Status: DISCONTINUED | OUTPATIENT
Start: 2021-09-28 | End: 2021-09-28 | Stop reason: HOSPADM

## 2021-09-28 RX ORDER — DEXAMETHASONE SODIUM PHOSPHATE 4 MG/ML
INJECTION, SOLUTION INTRA-ARTICULAR; INTRALESIONAL; INTRAMUSCULAR; INTRAVENOUS; SOFT TISSUE
Status: DISCONTINUED | OUTPATIENT
Start: 2021-09-28 | End: 2021-09-28 | Stop reason: HOSPADM

## 2021-09-28 RX ORDER — ONDANSETRON 2 MG/ML
4 INJECTION INTRAMUSCULAR; INTRAVENOUS DAILY PRN
Status: DISCONTINUED | OUTPATIENT
Start: 2021-09-28 | End: 2021-09-28 | Stop reason: HOSPADM

## 2021-09-28 RX ORDER — PROPOFOL 10 MG/ML
VIAL (ML) INTRAVENOUS
Status: DISCONTINUED | OUTPATIENT
Start: 2021-09-28 | End: 2021-09-28

## 2021-09-28 RX ORDER — SODIUM CHLORIDE, SODIUM LACTATE, POTASSIUM CHLORIDE, CALCIUM CHLORIDE 600; 310; 30; 20 MG/100ML; MG/100ML; MG/100ML; MG/100ML
INJECTION, SOLUTION INTRAVENOUS CONTINUOUS
Status: DISCONTINUED | OUTPATIENT
Start: 2021-09-28 | End: 2021-09-28 | Stop reason: HOSPADM

## 2021-09-28 RX ORDER — SODIUM CHLORIDE, SODIUM LACTATE, POTASSIUM CHLORIDE, CALCIUM CHLORIDE 600; 310; 30; 20 MG/100ML; MG/100ML; MG/100ML; MG/100ML
125 INJECTION, SOLUTION INTRAVENOUS CONTINUOUS
Status: DISCONTINUED | OUTPATIENT
Start: 2021-09-28 | End: 2021-09-28 | Stop reason: HOSPADM

## 2021-09-28 RX ORDER — LIDOCAINE HYDROCHLORIDE 20 MG/ML
INJECTION, SOLUTION EPIDURAL; INFILTRATION; INTRACAUDAL; PERINEURAL
Status: DISCONTINUED | OUTPATIENT
Start: 2021-09-28 | End: 2021-09-28

## 2021-09-28 RX ORDER — KETOROLAC TROMETHAMINE 30 MG/ML
INJECTION, SOLUTION INTRAMUSCULAR; INTRAVENOUS
Status: DISCONTINUED
Start: 2021-09-28 | End: 2021-09-28 | Stop reason: HOSPADM

## 2021-09-28 RX ORDER — MIDAZOLAM HYDROCHLORIDE 1 MG/ML
INJECTION, SOLUTION INTRAMUSCULAR; INTRAVENOUS
Status: DISCONTINUED | OUTPATIENT
Start: 2021-09-28 | End: 2021-09-28

## 2021-09-28 RX ORDER — LIDOCAINE HYDROCHLORIDE 10 MG/ML
1 INJECTION, SOLUTION EPIDURAL; INFILTRATION; INTRACAUDAL; PERINEURAL ONCE
Status: CANCELLED | OUTPATIENT
Start: 2021-09-28 | End: 2021-09-28

## 2021-09-28 RX ORDER — SODIUM CHLORIDE, SODIUM LACTATE, POTASSIUM CHLORIDE, CALCIUM CHLORIDE 600; 310; 30; 20 MG/100ML; MG/100ML; MG/100ML; MG/100ML
INJECTION, SOLUTION INTRAVENOUS CONTINUOUS
Status: CANCELLED | OUTPATIENT
Start: 2021-09-28

## 2021-09-28 RX ORDER — BUPIVACAINE HYDROCHLORIDE 5 MG/ML
INJECTION, SOLUTION EPIDURAL; INTRACAUDAL
Status: DISCONTINUED | OUTPATIENT
Start: 2021-09-28 | End: 2021-09-28 | Stop reason: HOSPADM

## 2021-09-28 RX ADMIN — LIDOCAINE HYDROCHLORIDE 60 MG: 20 INJECTION, SOLUTION EPIDURAL; INFILTRATION; INTRACAUDAL; PERINEURAL at 07:09

## 2021-09-28 RX ADMIN — MIDAZOLAM HYDROCHLORIDE 2 MG: 1 INJECTION, SOLUTION INTRAMUSCULAR; INTRAVENOUS at 07:09

## 2021-09-28 RX ADMIN — PROPOFOL 30 MG: 10 INJECTION, EMULSION INTRAVENOUS at 07:09

## 2021-09-28 RX ADMIN — SODIUM CHLORIDE, SODIUM LACTATE, POTASSIUM CHLORIDE, AND CALCIUM CHLORIDE: .6; .31; .03; .02 INJECTION, SOLUTION INTRAVENOUS at 06:09

## 2021-09-28 RX ADMIN — PROPOFOL 40 MG: 10 INJECTION, EMULSION INTRAVENOUS at 07:09

## 2021-09-28 RX ADMIN — PROPOFOL 20 MG: 10 INJECTION, EMULSION INTRAVENOUS at 07:09

## 2021-10-06 ENCOUNTER — TELEPHONE (OUTPATIENT)
Dept: PODIATRY | Facility: CLINIC | Age: 46
End: 2021-10-06

## 2021-10-06 RX ORDER — OXYCODONE AND ACETAMINOPHEN 10; 325 MG/1; MG/1
1 TABLET ORAL 3 TIMES DAILY
Qty: 90 TABLET | Refills: 0 | Status: SHIPPED | OUTPATIENT
Start: 2021-10-06 | End: 2021-11-02 | Stop reason: SDUPTHER

## 2021-10-12 ENCOUNTER — OFFICE VISIT (OUTPATIENT)
Dept: PODIATRY | Facility: CLINIC | Age: 46
End: 2021-10-12
Payer: MEDICARE

## 2021-10-12 VITALS
DIASTOLIC BLOOD PRESSURE: 97 MMHG | HEIGHT: 72 IN | BODY MASS INDEX: 25.06 KG/M2 | WEIGHT: 185 LBS | TEMPERATURE: 98 F | SYSTOLIC BLOOD PRESSURE: 153 MMHG | HEART RATE: 94 BPM

## 2021-10-12 DIAGNOSIS — G35 MS (MULTIPLE SCLEROSIS): ICD-10-CM

## 2021-10-12 DIAGNOSIS — M19.071 PRIMARY OSTEOARTHRITIS OF RIGHT FOOT: Primary | ICD-10-CM

## 2021-10-12 DIAGNOSIS — M79.2 NEURITIS: ICD-10-CM

## 2021-10-12 PROCEDURE — 99999 PR PBB SHADOW E&M-EST. PATIENT-LVL IV: CPT | Mod: PBBFAC,,, | Performed by: PODIATRIST

## 2021-10-12 PROCEDURE — 99214 OFFICE O/P EST MOD 30 MIN: CPT | Mod: PBBFAC,PN | Performed by: PODIATRIST

## 2021-10-12 PROCEDURE — 99999 PR PBB SHADOW E&M-EST. PATIENT-LVL IV: ICD-10-PCS | Mod: PBBFAC,,, | Performed by: PODIATRIST

## 2021-10-12 PROCEDURE — 99213 OFFICE O/P EST LOW 20 MIN: CPT | Mod: S$PBB,,, | Performed by: PODIATRIST

## 2021-10-12 PROCEDURE — 99213 PR OFFICE/OUTPT VISIT, EST, LEVL III, 20-29 MIN: ICD-10-PCS | Mod: S$PBB,,, | Performed by: PODIATRIST

## 2021-10-20 ENCOUNTER — HOSPITAL ENCOUNTER (OUTPATIENT)
Dept: RADIOLOGY | Facility: HOSPITAL | Age: 46
Discharge: HOME | End: 2021-10-20
Attending: PODIATRIST
Payer: MEDICARE

## 2021-10-20 ENCOUNTER — OFFICE VISIT (OUTPATIENT)
Dept: PODIATRY | Facility: CLINIC | Age: 46
End: 2021-10-20
Payer: MEDICARE

## 2021-10-20 ENCOUNTER — TELEPHONE (OUTPATIENT)
Dept: PODIATRY | Facility: CLINIC | Age: 46
End: 2021-10-20

## 2021-10-20 VITALS
WEIGHT: 185 LBS | HEART RATE: 68 BPM | SYSTOLIC BLOOD PRESSURE: 120 MMHG | BODY MASS INDEX: 25.06 KG/M2 | DIASTOLIC BLOOD PRESSURE: 79 MMHG | HEIGHT: 72 IN | RESPIRATION RATE: 18 BRPM

## 2021-10-20 DIAGNOSIS — S99.921A TOE INJURY, RIGHT, INITIAL ENCOUNTER: ICD-10-CM

## 2021-10-20 DIAGNOSIS — S92.514A CLOSED NONDISPLACED FRACTURE OF PROXIMAL PHALANX OF LESSER TOE OF RIGHT FOOT, INITIAL ENCOUNTER: Primary | ICD-10-CM

## 2021-10-20 PROCEDURE — 99999 PR PBB SHADOW E&M-EST. PATIENT-LVL IV: CPT | Mod: PBBFAC,,, | Performed by: PODIATRIST

## 2021-10-20 PROCEDURE — 73630 X-RAY EXAM OF FOOT: CPT | Mod: 26,RT,, | Performed by: RADIOLOGY

## 2021-10-20 PROCEDURE — 99999 PR PBB SHADOW E&M-EST. PATIENT-LVL IV: ICD-10-PCS | Mod: PBBFAC,,, | Performed by: PODIATRIST

## 2021-10-20 PROCEDURE — 99213 PR OFFICE/OUTPT VISIT, EST, LEVL III, 20-29 MIN: ICD-10-PCS | Mod: S$PBB,,, | Performed by: PODIATRIST

## 2021-10-20 PROCEDURE — 99214 OFFICE O/P EST MOD 30 MIN: CPT | Mod: PBBFAC | Performed by: PODIATRIST

## 2021-10-20 PROCEDURE — 73630 X-RAY EXAM OF FOOT: CPT | Mod: TC,FY,RT

## 2021-10-20 PROCEDURE — 73630 XR FOOT COMPLETE 3 VIEW RIGHT: ICD-10-PCS | Mod: 26,RT,, | Performed by: RADIOLOGY

## 2021-10-20 PROCEDURE — 99213 OFFICE O/P EST LOW 20 MIN: CPT | Mod: S$PBB,,, | Performed by: PODIATRIST

## 2021-10-22 PROBLEM — S92.514A CLOSED NONDISPLACED FRACTURE OF PROXIMAL PHALANX OF LESSER TOE OF RIGHT FOOT: Status: ACTIVE | Noted: 2021-10-22

## 2021-11-02 ENCOUNTER — TELEPHONE (OUTPATIENT)
Dept: PODIATRY | Facility: CLINIC | Age: 46
End: 2021-11-02
Payer: MEDICARE

## 2021-11-02 RX ORDER — OXYCODONE AND ACETAMINOPHEN 10; 325 MG/1; MG/1
1 TABLET ORAL 3 TIMES DAILY
Qty: 90 TABLET | Refills: 0 | Status: SHIPPED | OUTPATIENT
Start: 2021-11-02 | End: 2021-12-02

## 2021-12-01 ENCOUNTER — TELEPHONE (OUTPATIENT)
Dept: PODIATRY | Facility: CLINIC | Age: 46
End: 2021-12-01
Payer: MEDICARE

## 2021-12-13 ENCOUNTER — TELEPHONE (OUTPATIENT)
Dept: PODIATRY | Facility: CLINIC | Age: 46
End: 2021-12-13
Payer: MEDICARE

## 2021-12-13 ENCOUNTER — HOSPITAL ENCOUNTER (OUTPATIENT)
Dept: RADIOLOGY | Facility: HOSPITAL | Age: 46
Discharge: HOME | End: 2021-12-13
Attending: PODIATRIST
Payer: MEDICARE

## 2021-12-13 ENCOUNTER — OFFICE VISIT (OUTPATIENT)
Dept: PODIATRY | Facility: CLINIC | Age: 46
End: 2021-12-13
Payer: MEDICARE

## 2021-12-13 ENCOUNTER — HOSPITAL ENCOUNTER (EMERGENCY)
Facility: HOSPITAL | Age: 46
Discharge: HOME | End: 2021-12-13
Attending: EMERGENCY MEDICINE
Payer: MEDICARE

## 2021-12-13 VITALS
DIASTOLIC BLOOD PRESSURE: 89 MMHG | HEIGHT: 72 IN | BODY MASS INDEX: 25.06 KG/M2 | RESPIRATION RATE: 18 BRPM | WEIGHT: 185 LBS | SYSTOLIC BLOOD PRESSURE: 137 MMHG | HEART RATE: 89 BPM

## 2021-12-13 VITALS
BODY MASS INDEX: 25.06 KG/M2 | RESPIRATION RATE: 17 BRPM | HEART RATE: 72 BPM | TEMPERATURE: 98 F | DIASTOLIC BLOOD PRESSURE: 112 MMHG | HEIGHT: 72 IN | SYSTOLIC BLOOD PRESSURE: 164 MMHG | WEIGHT: 185 LBS | OXYGEN SATURATION: 97 %

## 2021-12-13 DIAGNOSIS — S92.514D CLOSED NONDISPLACED FRACTURE OF PROXIMAL PHALANX OF LESSER TOE OF RIGHT FOOT WITH ROUTINE HEALING, SUBSEQUENT ENCOUNTER: ICD-10-CM

## 2021-12-13 DIAGNOSIS — M79.2 NEURITIS: ICD-10-CM

## 2021-12-13 DIAGNOSIS — M79.671 CHRONIC PAIN IN RIGHT FOOT: Primary | ICD-10-CM

## 2021-12-13 DIAGNOSIS — G89.29 CHRONIC PAIN IN RIGHT FOOT: Primary | ICD-10-CM

## 2021-12-13 DIAGNOSIS — G35 MS (MULTIPLE SCLEROSIS): ICD-10-CM

## 2021-12-13 DIAGNOSIS — L02.412 ABSCESS OF LEFT AXILLA: ICD-10-CM

## 2021-12-13 DIAGNOSIS — Z76.89 ENCOUNTER FOR INCISION AND DRAINAGE PROCEDURE: Primary | ICD-10-CM

## 2021-12-13 PROCEDURE — 73630 X-RAY EXAM OF FOOT: CPT | Mod: 26,RT,, | Performed by: RADIOLOGY

## 2021-12-13 PROCEDURE — 10061 I&D ABSCESS COMP/MULTIPLE: CPT

## 2021-12-13 PROCEDURE — 25000003 PHARM REV CODE 250: Performed by: EMERGENCY MEDICINE

## 2021-12-13 PROCEDURE — 99999 PR PBB SHADOW E&M-EST. PATIENT-LVL V: CPT | Mod: PBBFAC,,, | Performed by: PODIATRIST

## 2021-12-13 PROCEDURE — 99999 PR PBB SHADOW E&M-EST. PATIENT-LVL V: ICD-10-PCS | Mod: PBBFAC,,, | Performed by: PODIATRIST

## 2021-12-13 PROCEDURE — 99215 OFFICE O/P EST HI 40 MIN: CPT | Mod: PBBFAC | Performed by: PODIATRIST

## 2021-12-13 PROCEDURE — 99283 EMERGENCY DEPT VISIT LOW MDM: CPT | Mod: 25,27

## 2021-12-13 PROCEDURE — 99214 OFFICE O/P EST MOD 30 MIN: CPT | Mod: S$PBB,,, | Performed by: PODIATRIST

## 2021-12-13 PROCEDURE — 73630 X-RAY EXAM OF FOOT: CPT | Mod: TC,FY,RT

## 2021-12-13 PROCEDURE — 99214 PR OFFICE/OUTPT VISIT, EST, LEVL IV, 30-39 MIN: ICD-10-PCS | Mod: S$PBB,,, | Performed by: PODIATRIST

## 2021-12-13 PROCEDURE — 73630 XR FOOT COMPLETE 3 VIEW RIGHT: ICD-10-PCS | Mod: 26,RT,, | Performed by: RADIOLOGY

## 2021-12-13 RX ORDER — SULFAMETHOXAZOLE AND TRIMETHOPRIM 800; 160 MG/1; MG/1
1 TABLET ORAL 2 TIMES DAILY
Qty: 14 TABLET | Refills: 0 | Status: SHIPPED | OUTPATIENT
Start: 2021-12-13 | End: 2021-12-20

## 2021-12-13 RX ORDER — OXYCODONE AND ACETAMINOPHEN 10; 325 MG/1; MG/1
1 TABLET ORAL 3 TIMES DAILY
Qty: 90 TABLET | Refills: 0 | Status: SHIPPED | OUTPATIENT
Start: 2021-12-13 | End: 2022-01-12 | Stop reason: SDUPTHER

## 2021-12-13 RX ORDER — SULFAMETHOXAZOLE AND TRIMETHOPRIM 800; 160 MG/1; MG/1
1 TABLET ORAL 2 TIMES DAILY
Status: DISCONTINUED | OUTPATIENT
Start: 2021-12-13 | End: 2021-12-13 | Stop reason: HOSPADM

## 2021-12-13 RX ORDER — BUSPIRONE HYDROCHLORIDE 15 MG/1
TABLET ORAL
COMMUNITY
Start: 2021-10-13 | End: 2022-09-26

## 2021-12-13 RX ORDER — DICLOFENAC SODIUM 75 MG/1
75 TABLET, DELAYED RELEASE ORAL 2 TIMES DAILY
COMMUNITY
Start: 2021-11-17 | End: 2021-12-31

## 2021-12-13 RX ORDER — PREGABALIN 150 MG/1
1 CAPSULE ORAL 3 TIMES DAILY
COMMUNITY
Start: 2021-10-13 | End: 2022-02-09 | Stop reason: SDUPTHER

## 2021-12-13 RX ORDER — MODAFINIL 200 MG/1
TABLET ORAL
COMMUNITY
Start: 2021-11-15

## 2021-12-13 RX ORDER — LIDOCAINE HYDROCHLORIDE 10 MG/ML
1 INJECTION INFILTRATION; PERINEURAL ONCE
Status: COMPLETED | OUTPATIENT
Start: 2021-12-13 | End: 2021-12-13

## 2021-12-13 RX ORDER — CLOTRIMAZOLE 1 %
CREAM (GRAM) TOPICAL
COMMUNITY
Start: 2021-11-02 | End: 2022-02-09 | Stop reason: SDUPTHER

## 2021-12-13 RX ADMIN — SULFAMETHOXAZOLE AND TRIMETHOPRIM 1 TABLET: 800; 160 TABLET ORAL at 08:12

## 2021-12-13 RX ADMIN — LIDOCAINE HYDROCHLORIDE 1 ML: 10 INJECTION, SOLUTION INFILTRATION; PERINEURAL at 08:12

## 2021-12-21 ENCOUNTER — PATIENT MESSAGE (OUTPATIENT)
Dept: NEUROLOGY | Facility: CLINIC | Age: 46
End: 2021-12-21
Payer: MEDICARE

## 2022-01-12 ENCOUNTER — OFFICE VISIT (OUTPATIENT)
Dept: PODIATRY | Facility: CLINIC | Age: 47
End: 2022-01-12
Payer: MEDICARE

## 2022-01-12 VITALS
HEIGHT: 72 IN | DIASTOLIC BLOOD PRESSURE: 87 MMHG | WEIGHT: 185 LBS | BODY MASS INDEX: 25.06 KG/M2 | SYSTOLIC BLOOD PRESSURE: 140 MMHG | HEART RATE: 77 BPM | RESPIRATION RATE: 18 BRPM

## 2022-01-12 DIAGNOSIS — M79.671 CHRONIC PAIN IN RIGHT FOOT: ICD-10-CM

## 2022-01-12 DIAGNOSIS — S93.311A CUBOID SYNDROME OF RIGHT FOOT: ICD-10-CM

## 2022-01-12 DIAGNOSIS — G35 MS (MULTIPLE SCLEROSIS): ICD-10-CM

## 2022-01-12 DIAGNOSIS — M79.2 NEURITIS: Primary | ICD-10-CM

## 2022-01-12 DIAGNOSIS — G89.29 CHRONIC PAIN IN RIGHT FOOT: ICD-10-CM

## 2022-01-12 PROCEDURE — 99213 PR OFFICE/OUTPT VISIT, EST, LEVL III, 20-29 MIN: ICD-10-PCS | Mod: S$PBB,,, | Performed by: PODIATRIST

## 2022-01-12 PROCEDURE — 99214 OFFICE O/P EST MOD 30 MIN: CPT | Mod: PBBFAC | Performed by: PODIATRIST

## 2022-01-12 PROCEDURE — 99999 PR PBB SHADOW E&M-EST. PATIENT-LVL IV: ICD-10-PCS | Mod: PBBFAC,,, | Performed by: PODIATRIST

## 2022-01-12 PROCEDURE — 99213 OFFICE O/P EST LOW 20 MIN: CPT | Mod: S$PBB,,, | Performed by: PODIATRIST

## 2022-01-12 PROCEDURE — 99999 PR PBB SHADOW E&M-EST. PATIENT-LVL IV: CPT | Mod: PBBFAC,,, | Performed by: PODIATRIST

## 2022-01-12 RX ORDER — OXYCODONE AND ACETAMINOPHEN 10; 325 MG/1; MG/1
1 TABLET ORAL 3 TIMES DAILY
Qty: 90 TABLET | Refills: 0 | Status: SHIPPED | OUTPATIENT
Start: 2022-01-12 | End: 2022-02-09 | Stop reason: SDUPTHER

## 2022-01-16 NOTE — PROGRESS NOTES
Subjective:       Patient ID: Sg Sesay is a 46 y.o. male.    Chief Complaint: Follow-up, Foot Pain, and Medication Refill  Patient presents today with complaint of pain on the right foot.  Patient also has a history of MS as well as chronic pain of both feet with associated neuritis.      Follow-up  Associated symptoms include joint swelling.   Foot Pain  Associated symptoms include joint swelling.   Toe Pain     Ankle Pain     Medication Refill  Associated symptoms include joint swelling.      Review of Systems   Musculoskeletal: Positive for back pain, gait problem and joint swelling.   All other systems reviewed and are negative.      Objective:      Physical Exam  Vitals and nursing note reviewed.   Constitutional:       Appearance: Normal appearance. He is well-developed.   Cardiovascular:      Rate and Rhythm: Normal rate and regular rhythm.      Pulses: Normal pulses.           Dorsalis pedis pulses are 2+ on the right side and 2+ on the left side.        Posterior tibial pulses are 2+ on the right side and 2+ on the left side.      Heart sounds: Normal heart sounds.   Pulmonary:      Effort: Pulmonary effort is normal.      Breath sounds: Normal breath sounds.   Musculoskeletal:         General: Swelling, tenderness and deformity present.      Right foot: Decreased range of motion. Deformity present.        Feet:    Feet:      Right foot:      Protective Sensation: 4 sites tested. 4 sites sensed.      Skin integrity: Erythema and warmth present.      Left foot:      Protective Sensation: 4 sites tested. 4 sites sensed.   Skin:     General: Skin is warm.      Capillary Refill: Capillary refill takes less than 2 seconds.   Neurological:      General: No focal deficit present.      Mental Status: He is alert.   Psychiatric:                                     Behavior: Behavior normal.         Thought Content: Thought content normal.         Judgment: Judgment normal.                                                                                              Assessment:       1. Neuritis    2. Chronic pain in right foot    3. Cuboid syndrome of right foot    4. MS (multiple sclerosis)        Plan:       Patient presents today with complaint of pain on the right foot.  Patient also has a history of MS as well as chronic pain of both feet with associated neuritis.  Patient states he is wearing his orthotics at all times and states he cannot go without them I had cut out in area to cradle the base of the 5th metatarsal cuboid on the right foot he states this has helped considerably while his feet still do ache on a regular basis.  Patient is currently under my care for pain management patient has any updated pain management contract on file and his p.m.p was evaluated a drug screen is up-to-date new prescription dispense.  Patient states he still waiting [facility] this has not yet been ordered.  Patient states the MRI is to re-evaluate his MS. I did discuss with the patient a lateral column fusion that would entail fusion at the base of the 5th metatarsal and cuboid area I advised the patient this is not typically something that is done on a regular basis however he has instability in this area the base of the 5th metatarsal has plantar flexed some degree which is become very prominent causing the patient a lot of pain and discomfort.  Patient advised this is something to consider certainly this would be a last resort.  Patient states he does believe that is MS has flared up and has gotten progressively more noticeable.   This note was created using [employer] voice recognition software that occasionally misinterpreted phrases or words.

## 2022-02-09 ENCOUNTER — OFFICE VISIT (OUTPATIENT)
Dept: PODIATRY | Facility: CLINIC | Age: 47
End: 2022-02-09
Payer: MEDICARE

## 2022-02-09 VITALS
SYSTOLIC BLOOD PRESSURE: 121 MMHG | HEIGHT: 72 IN | HEART RATE: 85 BPM | WEIGHT: 185 LBS | TEMPERATURE: 98 F | DIASTOLIC BLOOD PRESSURE: 85 MMHG | RESPIRATION RATE: 16 BRPM | BODY MASS INDEX: 25.06 KG/M2

## 2022-02-09 DIAGNOSIS — M79.2 NEURITIS: Primary | ICD-10-CM

## 2022-02-09 DIAGNOSIS — G89.29 CHRONIC PAIN IN RIGHT FOOT: ICD-10-CM

## 2022-02-09 DIAGNOSIS — G35 MS (MULTIPLE SCLEROSIS): ICD-10-CM

## 2022-02-09 DIAGNOSIS — M79.671 CHRONIC PAIN IN RIGHT FOOT: ICD-10-CM

## 2022-02-09 PROCEDURE — 99213 OFFICE O/P EST LOW 20 MIN: CPT | Mod: S$PBB,,, | Performed by: PODIATRIST

## 2022-02-09 PROCEDURE — 99213 PR OFFICE/OUTPT VISIT, EST, LEVL III, 20-29 MIN: ICD-10-PCS | Mod: S$PBB,,, | Performed by: PODIATRIST

## 2022-02-09 PROCEDURE — 99999 PR PBB SHADOW E&M-EST. PATIENT-LVL IV: ICD-10-PCS | Mod: PBBFAC,,, | Performed by: PODIATRIST

## 2022-02-09 PROCEDURE — 99214 OFFICE O/P EST MOD 30 MIN: CPT | Mod: PBBFAC | Performed by: PODIATRIST

## 2022-02-09 PROCEDURE — 99999 PR PBB SHADOW E&M-EST. PATIENT-LVL IV: CPT | Mod: PBBFAC,,, | Performed by: PODIATRIST

## 2022-02-09 RX ORDER — PREGABALIN 150 MG/1
150 CAPSULE ORAL
COMMUNITY
Start: 2021-08-24 | End: 2022-04-04 | Stop reason: SDUPTHER

## 2022-02-09 RX ORDER — LORAZEPAM 2 MG/1
TABLET ORAL
COMMUNITY
Start: 2022-01-29 | End: 2022-09-13

## 2022-02-09 RX ORDER — DICLOFENAC SODIUM 75 MG/1
TABLET, DELAYED RELEASE ORAL
COMMUNITY
Start: 2021-07-06 | End: 2022-03-09 | Stop reason: SDUPTHER

## 2022-02-09 RX ORDER — AMOXICILLIN 500 MG/1
TABLET, FILM COATED ORAL
COMMUNITY
Start: 2022-01-30 | End: 2022-03-09

## 2022-02-09 RX ORDER — OXYCODONE AND ACETAMINOPHEN 10; 325 MG/1; MG/1
1 TABLET ORAL 3 TIMES DAILY
Qty: 90 TABLET | Refills: 0 | Status: SHIPPED | OUTPATIENT
Start: 2022-02-09 | End: 2022-03-09 | Stop reason: SDUPTHER

## 2022-02-09 RX ORDER — BUSPIRONE HYDROCHLORIDE 15 MG/1
TABLET ORAL
COMMUNITY
Start: 2021-10-13 | End: 2022-02-09 | Stop reason: SDUPTHER

## 2022-02-12 NOTE — PROGRESS NOTES
Subjective:       Patient ID: Sg Sesay is a 46 y.o. male.    Chief Complaint: Follow-up and Foot Pain  Patient presents today with complaint of pain on the right foot.  Patient also has a history of MS as well as chronic pain of both feet with associated neuritis.      Follow-up  Associated symptoms include joint swelling.   Foot Pain  Associated symptoms include joint swelling.   Medication Refill  Associated symptoms include joint swelling.   Toe Pain     Ankle Pain        Review of Systems   Musculoskeletal: Positive for back pain, gait problem and joint swelling.   All other systems reviewed and are negative.      Objective:      Physical Exam  Vitals and nursing note reviewed.   Constitutional:       Appearance: Normal appearance. He is well-developed.   Cardiovascular:      Rate and Rhythm: Normal rate and regular rhythm.      Pulses: Normal pulses.           Dorsalis pedis pulses are 2+ on the right side and 2+ on the left side.        Posterior tibial pulses are 2+ on the right side and 2+ on the left side.      Heart sounds: Normal heart sounds.   Pulmonary:      Effort: Pulmonary effort is normal.      Breath sounds: Normal breath sounds.   Musculoskeletal:         General: Swelling, tenderness and deformity present.      Right foot: Decreased range of motion. Deformity present.        Feet:    Feet:      Right foot:      Protective Sensation: 4 sites tested. 4 sites sensed.      Skin integrity: Erythema and warmth present.      Left foot:      Protective Sensation: 4 sites tested. 4 sites sensed.   Skin:     General: Skin is warm.      Capillary Refill: Capillary refill takes less than 2 seconds.   Neurological:      General: No focal deficit present.      Mental Status: He is alert.   Psychiatric:         Behavior: Behavior normal.         Thought Content: Thought content normal.         Judgment: Judgment normal.                                                         Assessment:       1.  Neuritis    2. Chronic pain in right foot    3. MS (multiple sclerosis)        Plan:       Patient presents today with complaint of pain on the right foot.  Patient also has a history of MS as well as chronic pain of both feet with associated neuritis.  Patient states he is wearing his orthotics at all times and states he cannot go without them I had cut out in area to cradle the base of the 5th metatarsal cuboid on the right foot he states this has helped considerably while his feet still do ache on a regular basis.  Patient is currently under my care for pain management patient has any updated pain management contract on file and his p.m.p was evaluated a drug screen is up-to-date new prescription dispense.  Patient states the MRI is to re-evaluate his MS. I did discuss with the patient a lateral column fusion that would entail fusion at the base of the 5th metatarsal and cuboid area I advised the patient this is not typically something that is done on a regular basis however he has instability in this area the base of the 5th metatarsal has plantar flexed some degree which is become very prominent causing the patient a lot of pain and discomfort.  Patient advised this is something to consider certainly this would be a last resort.  Patient states he does believe that is MS has flared up and has gotten progressively more noticeable.  Patient states he is going back to seen the neurologist on Monday and is awaiting his MRI to evaluate his MS.  This note was created using [employer] voice recognition software that occasionally misinterpreted phrases or words.

## 2022-02-28 ENCOUNTER — PATIENT MESSAGE (OUTPATIENT)
Dept: PSYCHIATRY | Facility: CLINIC | Age: 47
End: 2022-02-28
Payer: MEDICARE

## 2022-03-09 ENCOUNTER — LAB VISIT (OUTPATIENT)
Dept: LAB | Facility: HOSPITAL | Age: 47
End: 2022-03-09
Attending: PODIATRIST
Payer: MEDICARE

## 2022-03-09 ENCOUNTER — TELEPHONE (OUTPATIENT)
Dept: PODIATRY | Facility: CLINIC | Age: 47
End: 2022-03-09
Payer: MEDICARE

## 2022-03-09 ENCOUNTER — OFFICE VISIT (OUTPATIENT)
Dept: PODIATRY | Facility: CLINIC | Age: 47
End: 2022-03-09
Payer: MEDICARE

## 2022-03-09 VITALS
WEIGHT: 185 LBS | SYSTOLIC BLOOD PRESSURE: 144 MMHG | DIASTOLIC BLOOD PRESSURE: 92 MMHG | BODY MASS INDEX: 25.06 KG/M2 | HEART RATE: 72 BPM | RESPIRATION RATE: 18 BRPM | HEIGHT: 72 IN

## 2022-03-09 DIAGNOSIS — M79.2 NEURITIS: Primary | ICD-10-CM

## 2022-03-09 DIAGNOSIS — M79.2 NEURITIS: ICD-10-CM

## 2022-03-09 DIAGNOSIS — M79.671 CHRONIC PAIN IN RIGHT FOOT: ICD-10-CM

## 2022-03-09 DIAGNOSIS — G35 MS (MULTIPLE SCLEROSIS): ICD-10-CM

## 2022-03-09 DIAGNOSIS — G89.29 CHRONIC PAIN IN RIGHT FOOT: ICD-10-CM

## 2022-03-09 PROCEDURE — 99213 OFFICE O/P EST LOW 20 MIN: CPT | Mod: S$PBB,,, | Performed by: PODIATRIST

## 2022-03-09 PROCEDURE — 99999 PR PBB SHADOW E&M-EST. PATIENT-LVL IV: ICD-10-PCS | Mod: PBBFAC,,, | Performed by: PODIATRIST

## 2022-03-09 PROCEDURE — 99999 PR PBB SHADOW E&M-EST. PATIENT-LVL IV: CPT | Mod: PBBFAC,,, | Performed by: PODIATRIST

## 2022-03-09 PROCEDURE — 99213 PR OFFICE/OUTPT VISIT, EST, LEVL III, 20-29 MIN: ICD-10-PCS | Mod: S$PBB,,, | Performed by: PODIATRIST

## 2022-03-09 PROCEDURE — 80307 DRUG TEST PRSMV CHEM ANLYZR: CPT | Performed by: PODIATRIST

## 2022-03-09 PROCEDURE — 99214 OFFICE O/P EST MOD 30 MIN: CPT | Mod: PBBFAC | Performed by: PODIATRIST

## 2022-03-09 RX ORDER — TERIFLUNOMIDE 14 MG/1
TABLET, FILM COATED ORAL
COMMUNITY
Start: 2022-02-21 | End: 2022-03-09

## 2022-03-09 RX ORDER — AMITRIPTYLINE HYDROCHLORIDE 25 MG/1
25 TABLET, FILM COATED ORAL NIGHTLY
Qty: 30 TABLET | Refills: 1 | Status: SHIPPED | OUTPATIENT
Start: 2022-03-09 | End: 2022-04-06 | Stop reason: SDUPTHER

## 2022-03-09 RX ORDER — TERIFLUNOMIDE 14 MG/1
1 TABLET, FILM COATED ORAL DAILY
COMMUNITY
Start: 2022-03-08 | End: 2022-03-09

## 2022-03-09 RX ORDER — OXYCODONE AND ACETAMINOPHEN 10; 325 MG/1; MG/1
1 TABLET ORAL 3 TIMES DAILY
Qty: 90 TABLET | Refills: 0 | Status: SHIPPED | OUTPATIENT
Start: 2022-03-09 | End: 2022-04-06 | Stop reason: SDUPTHER

## 2022-03-09 NOTE — TELEPHONE ENCOUNTER
----- Message from Jah Torres DPM sent at 3/9/2022 12:16 PM CST -----  Please let the patient know his drug screen is fine his prescription has been sent.

## 2022-03-12 NOTE — PROGRESS NOTES
Subjective:       Patient ID: Sg Sesay is a 46 y.o. male.    Chief Complaint: Follow-up and Foot Pain  Patient presents today with complaint of pain on the right foot.  Patient also has a history of MS as well as chronic pain of both feet with associated neuritis.      Follow-up  Associated symptoms include joint swelling.   Foot Pain  Associated symptoms include joint swelling.   Medication Refill  Associated symptoms include joint swelling.   Toe Pain     Ankle Pain        Review of Systems   Musculoskeletal: Positive for back pain, gait problem and joint swelling.   All other systems reviewed and are negative.      Objective:      Physical Exam  Vitals and nursing note reviewed.   Constitutional:       Appearance: Normal appearance. He is well-developed.   Cardiovascular:      Rate and Rhythm: Normal rate and regular rhythm.      Pulses: Normal pulses.           Dorsalis pedis pulses are 2+ on the right side and 2+ on the left side.        Posterior tibial pulses are 2+ on the right side and 2+ on the left side.      Heart sounds: Normal heart sounds.   Pulmonary:      Effort: Pulmonary effort is normal.      Breath sounds: Normal breath sounds.   Musculoskeletal:         General: Swelling, tenderness and deformity present.      Right foot: Decreased range of motion. Deformity present.        Feet:    Feet:      Right foot:      Protective Sensation: 4 sites tested. 4 sites sensed.      Skin integrity: Erythema and warmth present.      Left foot:      Protective Sensation: 4 sites tested. 4 sites sensed.   Skin:     General: Skin is warm.      Capillary Refill: Capillary refill takes less than 2 seconds.   Neurological:      General: No focal deficit present.      Mental Status: He is alert.   Psychiatric:         Behavior: Behavior normal.         Thought Content: Thought content normal.         Judgment: Judgment normal.                                                                                  Assessment:       1. Neuritis    2. Chronic pain in right foot    3. MS (multiple sclerosis)        Plan:       Patient presents today with complaint of pain on the right foot.  Patient also has a history of MS as well as chronic pain of both feet with associated neuritis.  Patient states he is wearing his orthotics at all times and states he cannot go without them I had cut out in area to cradle the base of the 5th metatarsal cuboid on the right foot he states this has helped considerably while his feet still do ache on a regular basis.  Patient is currently under my care for pain management patient has any updated pain management contract on file and his p.m.p was evaluated a drug screen is up-to-date new prescription dispense.   did discuss with the patient a lateral column fusion that would entail fusion at the base of the 5th metatarsal and cuboid area I advised the patient this is not typically something that is done on a regular basis however he has instability in this area the base of the 5th metatarsal has plantar flexed some degree which is become very prominent causing the patient a lot of pain and discomfort.  Patient advised this is something to consider certainly this would be a last resort.  Patient states he did follow up with Neurology and had his MRI done which revealed no new lesions related to his MS and it does not appear that is MS has progressed.  Patient indicated that there is no way that he can walk without his supports he states it feels like there is a torch being held to the bottom of his foot he does take his Percocet as directed as well as his Lyrica.  Patient's drug screen was evaluated and is satisfactory a new prescription was sent.  Patient is having a lot of pain and discomfort at night I have recommended starting him on amitriptyline 25 mg at bedtime to better settle down the symptoms he is having at night  patient advised this does can be adjusted accordingly depending upon how he responds to it.  This note was created using [employer] voice recognition software that occasionally misinterpreted phrases or words.

## 2022-04-04 ENCOUNTER — TELEPHONE (OUTPATIENT)
Dept: PODIATRY | Facility: CLINIC | Age: 47
End: 2022-04-04
Payer: MEDICARE

## 2022-04-04 RX ORDER — PREGABALIN 150 MG/1
150 CAPSULE ORAL 3 TIMES DAILY
Qty: 90 CAPSULE | Refills: 4 | Status: SHIPPED | OUTPATIENT
Start: 2022-04-04 | End: 2022-05-04

## 2022-04-04 NOTE — TELEPHONE ENCOUNTER
Patient notified.   Pt pleasantly confused, sitting at community table eating breakfast.  Pt denied any pain.  Pt on RA with no s/s of acute distress.  Pt blind in R eye and has expressive aphasia.  Pt compliant with morning medication regimen.  Dez palafox R ankle.  Will continue to monitor.

## 2022-04-04 NOTE — TELEPHONE ENCOUNTER
Requested by:pharmacy/patient      Date Patient Last Seen:3/09/22    Patient Next Follow up appointment scheduled: 22          Last Prescription:  pregabalin (LYRICA) 150 MG capsule   2021 No   Si mg.   Class: Historical Med   Order: [id number]   Date/Time Signed: 2022 10:53             Pharmacy    [id number] [place] - [address] [facility] [address]

## 2022-04-06 ENCOUNTER — OFFICE VISIT (OUTPATIENT)
Dept: PODIATRY | Facility: CLINIC | Age: 47
End: 2022-04-06
Payer: MEDICARE

## 2022-04-06 VITALS
SYSTOLIC BLOOD PRESSURE: 128 MMHG | BODY MASS INDEX: 25.19 KG/M2 | RESPIRATION RATE: 18 BRPM | HEART RATE: 77 BPM | DIASTOLIC BLOOD PRESSURE: 77 MMHG | HEIGHT: 72 IN | WEIGHT: 186 LBS

## 2022-04-06 DIAGNOSIS — M79.2 NEURITIS: Primary | ICD-10-CM

## 2022-04-06 DIAGNOSIS — M19.071 PRIMARY OSTEOARTHRITIS OF RIGHT FOOT: ICD-10-CM

## 2022-04-06 DIAGNOSIS — G35 MS (MULTIPLE SCLEROSIS): ICD-10-CM

## 2022-04-06 DIAGNOSIS — M72.2 PLANTAR FASCIITIS: ICD-10-CM

## 2022-04-06 DIAGNOSIS — M79.671 FOOT PAIN, RIGHT: ICD-10-CM

## 2022-04-06 PROCEDURE — 99214 OFFICE O/P EST MOD 30 MIN: CPT | Mod: PBBFAC | Performed by: PODIATRIST

## 2022-04-06 PROCEDURE — 99213 PR OFFICE/OUTPT VISIT, EST, LEVL III, 20-29 MIN: ICD-10-PCS | Mod: S$PBB,,, | Performed by: PODIATRIST

## 2022-04-06 PROCEDURE — 99213 OFFICE O/P EST LOW 20 MIN: CPT | Mod: S$PBB,,, | Performed by: PODIATRIST

## 2022-04-06 PROCEDURE — 99999 PR PBB SHADOW E&M-EST. PATIENT-LVL IV: CPT | Mod: PBBFAC,,, | Performed by: PODIATRIST

## 2022-04-06 PROCEDURE — 99999 PR PBB SHADOW E&M-EST. PATIENT-LVL IV: ICD-10-PCS | Mod: PBBFAC,,, | Performed by: PODIATRIST

## 2022-04-06 RX ORDER — AMITRIPTYLINE HYDROCHLORIDE 25 MG/1
25 TABLET, FILM COATED ORAL NIGHTLY
Qty: 30 TABLET | Refills: 1 | Status: SHIPPED | OUTPATIENT
Start: 2022-04-06 | End: 2023-01-31

## 2022-04-06 RX ORDER — OXYCODONE AND ACETAMINOPHEN 10; 325 MG/1; MG/1
1 TABLET ORAL 3 TIMES DAILY
Qty: 90 TABLET | Refills: 0 | Status: SHIPPED | OUTPATIENT
Start: 2022-04-06 | End: 2022-05-06

## 2022-04-09 NOTE — PROGRESS NOTES
Subjective:       Patient ID: Sg Sesay is a 46 y.o. male.    Chief Complaint: Follow-up and Foot Pain  Patient presents today with complaint of pain on the right foot.  Patient also has a history of MS as well as chronic pain of both feet with associated neuritis.      Follow-up  Associated symptoms include joint swelling.   Foot Pain  Associated symptoms include joint swelling.   Medication Refill  Associated symptoms include joint swelling.   Toe Pain     Ankle Pain        Review of Systems   Musculoskeletal: Positive for back pain, gait problem and joint swelling.   All other systems reviewed and are negative.      Objective:      Physical Exam  Vitals and nursing note reviewed.   Constitutional:       Appearance: Normal appearance. He is well-developed.   Cardiovascular:      Rate and Rhythm: Normal rate and regular rhythm.      Pulses: Normal pulses.           Dorsalis pedis pulses are 2+ on the right side and 2+ on the left side.        Posterior tibial pulses are 2+ on the right side and 2+ on the left side.      Heart sounds: Normal heart sounds.   Pulmonary:      Effort: Pulmonary effort is normal.      Breath sounds: Normal breath sounds.   Musculoskeletal:         General: Swelling, tenderness and deformity present.      Right foot: Decreased range of motion. Deformity present.        Feet:    Feet:      Right foot:      Protective Sensation: 4 sites tested. 4 sites sensed.      Skin integrity: Erythema and warmth present.      Left foot:      Protective Sensation: 4 sites tested. 4 sites sensed.   Skin:     General: Skin is warm.      Capillary Refill: Capillary refill takes less than 2 seconds.   Neurological:      General: No focal deficit present.      Mental Status: He is alert.   Psychiatric:         Behavior: Behavior normal.         Thought Content: Thought content normal.         Judgment: Judgment normal.            Assessment:       1. Neuritis    2. Primary osteoarthritis of right  foot    3. Foot pain, right    4. Plantar fasciitis - Left Foot    5. MS (multiple sclerosis)        Plan:       Patient presents today with complaint of pain on the right foot.  Patient also has a history of MS as well as chronic pain of both feet with associated neuritis.  Patient states he is wearing his orthotics at all times and states he cannot go without them I had cut out in area to cradle the base of the 5th metatarsal cuboid on the right foot he states this has helped considerably while his feet still do ache on a regular basis.  Patient is currently under my care for pain management patient has any updated pain management contract on file and his p.m.p was evaluated a drug screen is up-to-date new prescription dispense.   did discuss with the patient a lateral column fusion that would entail fusion at the base of the 5th metatarsal and cuboid area I advised the patient this is not typically something that is done on a regular basis however he has instability in this area the base of the 5th metatarsal has plantar flexed some degree which is become very prominent causing the patient a lot of pain and discomfort.  Patient advised this is something to consider certainly this would be a last resort.  Patient states he did follow up with Neurology and had his MRI done which revealed no new lesions related to his MS and it does not appear that is MS has progressed.  Patient indicated that there is no way that he can walk without his supports he states it feels like there is a torch being held to the bottom of his foot he does take his Percocet as directed as well as his Lyrica.  Patient's drug screen was evaluated and is satisfactory a new prescription was sent.  I had previously given the patient prescription for amitriptyline 25 mg at bedtime he states he was unaware that this prescription was sent to the pharmacy he forgot the pharmacy did not contact him so I again sent the prescription for the  amitriptyline I advised the patient I think this will be important for him to take at night to help with the discomfort that he is having at night related to the nerve pain.  Patient is going to start taking this and see how he does he is currently wearing a sleep study watch for the next 2 weeks.  Patient states he was supposed to be taking a trip to [facility] for a family reunion however he just does not think that he could do the walking it is going to be pushed back until November he is hoping that he can get some relief or even have a procedure done before that time so that he will be able to attend the trip.  This note was created using [employer] voice recognition software that occasionally misinterpreted phrases or words.

## 2022-05-09 ENCOUNTER — OFFICE VISIT (OUTPATIENT)
Dept: PODIATRY | Facility: CLINIC | Age: 47
End: 2022-05-09
Payer: MEDICARE

## 2022-05-09 VITALS
RESPIRATION RATE: 16 BRPM | WEIGHT: 190.88 LBS | DIASTOLIC BLOOD PRESSURE: 83 MMHG | SYSTOLIC BLOOD PRESSURE: 127 MMHG | HEART RATE: 79 BPM | HEIGHT: 72 IN | BODY MASS INDEX: 25.85 KG/M2

## 2022-05-09 DIAGNOSIS — L23.9 ALLERGIC DERMATITIS: Primary | ICD-10-CM

## 2022-05-09 DIAGNOSIS — S92.514D CLOSED NONDISPLACED FRACTURE OF PROXIMAL PHALANX OF LESSER TOE OF RIGHT FOOT WITH ROUTINE HEALING, SUBSEQUENT ENCOUNTER: ICD-10-CM

## 2022-05-09 DIAGNOSIS — M79.671 FOOT PAIN, RIGHT: ICD-10-CM

## 2022-05-09 DIAGNOSIS — G57.51 TARSAL TUNNEL SYNDROME OF RIGHT SIDE: ICD-10-CM

## 2022-05-09 DIAGNOSIS — M79.2 NEURITIS: ICD-10-CM

## 2022-05-09 PROCEDURE — 99213 OFFICE O/P EST LOW 20 MIN: CPT | Mod: 25,S$PBB,, | Performed by: PODIATRIST

## 2022-05-09 PROCEDURE — 99999 PR PBB SHADOW E&M-EST. PATIENT-LVL V: ICD-10-PCS | Mod: PBBFAC,,, | Performed by: PODIATRIST

## 2022-05-09 PROCEDURE — 99213 PR OFFICE/OUTPT VISIT, EST, LEVL III, 20-29 MIN: ICD-10-PCS | Mod: 25,S$PBB,, | Performed by: PODIATRIST

## 2022-05-09 PROCEDURE — 96372 THER/PROPH/DIAG INJ SC/IM: CPT | Mod: PBBFAC

## 2022-05-09 PROCEDURE — 99215 OFFICE O/P EST HI 40 MIN: CPT | Mod: PBBFAC | Performed by: PODIATRIST

## 2022-05-09 PROCEDURE — 99999 PR PBB SHADOW E&M-EST. PATIENT-LVL V: CPT | Mod: PBBFAC,,, | Performed by: PODIATRIST

## 2022-05-09 RX ORDER — CLOTRIMAZOLE 1 %
CREAM (GRAM) TOPICAL
COMMUNITY
Start: 2021-11-02 | End: 2022-07-06

## 2022-05-09 RX ORDER — KETOROLAC TROMETHAMINE 30 MG/ML
30 INJECTION, SOLUTION INTRAMUSCULAR; INTRAVENOUS
Status: COMPLETED | OUTPATIENT
Start: 2022-05-09 | End: 2022-05-09

## 2022-05-09 RX ORDER — PREGABALIN 150 MG/1
1 CAPSULE ORAL 3 TIMES DAILY
COMMUNITY
Start: 2021-10-13 | End: 2022-06-06 | Stop reason: SDUPTHER

## 2022-05-09 RX ORDER — QUETIAPINE FUMARATE 25 MG/1
12.5 TABLET, FILM COATED ORAL
COMMUNITY
Start: 2022-02-01 | End: 2024-03-04

## 2022-05-09 RX ORDER — BETAMETHASONE SODIUM PHOSPHATE AND BETAMETHASONE ACETATE 3; 3 MG/ML; MG/ML
18 INJECTION, SUSPENSION INTRA-ARTICULAR; INTRALESIONAL; INTRAMUSCULAR; SOFT TISSUE
Status: COMPLETED | OUTPATIENT
Start: 2022-05-09 | End: 2022-05-09

## 2022-05-09 RX ORDER — BUSPIRONE HYDROCHLORIDE 15 MG/1
15 TABLET ORAL
COMMUNITY
Start: 2021-10-13 | End: 2022-05-09 | Stop reason: SDUPTHER

## 2022-05-09 RX ORDER — TERIFLUNOMIDE 14 MG/1
14 TABLET, FILM COATED ORAL
COMMUNITY
Start: 2022-02-21 | End: 2022-06-06 | Stop reason: ALTCHOICE

## 2022-05-09 RX ADMIN — KETOROLAC TROMETHAMINE 30 MG: 30 INJECTION, SOLUTION INTRAMUSCULAR; INTRAVENOUS at 09:05

## 2022-05-09 RX ADMIN — BETAMETHASONE SODIUM PHOSPHATE AND BETAMETHASONE ACETATE 18 MG: 3; 3 INJECTION, SUSPENSION INTRA-ARTICULAR; INTRALESIONAL; INTRAMUSCULAR at 09:05

## 2022-05-10 NOTE — PROGRESS NOTES
Subjective:       Patient ID: Sg Sesay is a 46 y.o. male.    Chief Complaint: Follow-up, Foot Pain, Foot Swelling, and Medication Refill  Patient presents today with complaint of pain on the right foot.  Patient also has a history of MS as well as chronic pain of both feet with associated neuritis.      Follow-up  Associated symptoms include joint swelling.   Foot Pain  Associated symptoms include joint swelling.   Medication Refill  Associated symptoms include joint swelling.   Toe Pain     Ankle Pain        Review of Systems   Musculoskeletal: Positive for back pain, gait problem and joint swelling.   All other systems reviewed and are negative.      Objective:      Physical Exam  Vitals and nursing note reviewed.   Constitutional:       Appearance: Normal appearance. He is well-developed.   Cardiovascular:      Rate and Rhythm: Normal rate and regular rhythm.      Pulses: Normal pulses.           Dorsalis pedis pulses are 2+ on the right side and 2+ on the left side.        Posterior tibial pulses are 2+ on the right side and 2+ on the left side.      Heart sounds: Normal heart sounds.   Pulmonary:      Effort: Pulmonary effort is normal.      Breath sounds: Normal breath sounds.   Musculoskeletal:         General: Swelling, tenderness and deformity present.      Right foot: Decreased range of motion. Deformity present.        Feet:    Feet:      Right foot:      Protective Sensation: 4 sites tested. 4 sites sensed.      Skin integrity: Erythema and warmth present.      Left foot:      Protective Sensation: 4 sites tested. 4 sites sensed.   Skin:     General: Skin is warm.      Capillary Refill: Capillary refill takes less than 2 seconds.   Neurological:      General: No focal deficit present.      Mental Status: He is alert.   Psychiatric:         Behavior: Behavior normal.         Thought Content: Thought content normal.         Judgment: Judgment normal.                                         Assessment:       1. Allergic dermatitis    2. Neuritis    3. Tarsal tunnel syndrome of right side    4. Foot pain, right    5. Closed nondisplaced fracture of proximal phalanx of lesser toe of right foot with routine healing, subsequent encounter        Plan:       Patient presents today with complaint of pain on the right foot.  Patient also has a history of MS as well as chronic pain of both feet with associated neuritis.  Patient states he is wearing his orthotics at all times and states he cannot go without them I had cut out in area to cradle the base of the 5th metatarsal cuboid on the right foot he states this has helped considerably while his feet still do ache on a regular basis.  Patient is currently under my care for pain management patient has any updated pain management contract on file and his p.m.p was evaluated a drug screen is up-to-date new prescription dispense.   did discuss with the patient a lateral column fusion that would entail fusion at the base of the 5th metatarsal and cuboid area I advised the patient this is not typically something that is done on a regular basis however he has instability in this area the base of the 5th metatarsal has plantar flexed some degree which is become very prominent causing the patient a lot of pain and discomfort.  Patient advised this is something to consider certainly this would be a last resort.  Patient states he did follow up with Neurology and had his MRI done which revealed no new lesions related to his MS and it does not appear that is MS has progressed.  Patient indicated that there is no way that he can walk without his supports he states it feels like there is a torch being held to the bottom of his foot he does take his Percocet as directed as well as his Lyrica.  Patient's drug screen was evaluated and is satisfactory a new prescription was sent.  Patient states he has been taking the amitriptyline 25 mg at  bedtime he states it really has not helped with the nerve pain that he is having in his feet I have recommended that he increase the dosage to 50 mg at bedtime and will see how the patient does with this I will send him a new prescription as appropriate.  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  Plan follow-up will be a month unless patient has any problems questions or concerns should his symptoms worsen he is to contact us immediately.  This note was created using [employer] voice recognition software that occasionally misinterpreted phrases or words.

## 2022-05-12 ENCOUNTER — TELEPHONE (OUTPATIENT)
Dept: PODIATRY | Facility: CLINIC | Age: 47
End: 2022-05-12
Payer: MEDICARE

## 2022-05-12 RX ORDER — OXYCODONE AND ACETAMINOPHEN 10; 325 MG/1; MG/1
1 TABLET ORAL 3 TIMES DAILY
Qty: 90 TABLET | Refills: 0 | Status: SHIPPED | OUTPATIENT
Start: 2022-05-12 | End: 2022-06-06 | Stop reason: SDUPTHER

## 2022-05-12 NOTE — TELEPHONE ENCOUNTER
Patient seen Monday states he was to receive refill of percocet and pharmacy says they haven't received order. Please advise.

## 2022-05-12 NOTE — TELEPHONE ENCOUNTER
----- Message from Danielle Page sent at 5/12/2022 11:34 AM CDT -----  Contact: Pt  Type: Prescription Status    Who Called:  Pt    Pharmacy name and phone #:    [facility] #[id number] - [place], [place] - [address]  [address]  [place] [address]  Phone: [phone] Fax: [phone]    Best Call Back Number: [phone]    Additional Information: Pt had appt on Monday & states that Dr. Torres was supposed to send in Mangatar, but pharmacy hasn't received it.  Please call back.  Thanks.

## 2022-06-06 ENCOUNTER — LAB VISIT (OUTPATIENT)
Dept: LAB | Facility: HOSPITAL | Age: 47
End: 2022-06-06
Attending: PODIATRIST
Payer: MEDICARE

## 2022-06-06 ENCOUNTER — OFFICE VISIT (OUTPATIENT)
Dept: PODIATRY | Facility: CLINIC | Age: 47
End: 2022-06-06
Payer: MEDICARE

## 2022-06-06 ENCOUNTER — TELEPHONE (OUTPATIENT)
Dept: PODIATRY | Facility: CLINIC | Age: 47
End: 2022-06-06
Payer: MEDICARE

## 2022-06-06 VITALS
DIASTOLIC BLOOD PRESSURE: 73 MMHG | SYSTOLIC BLOOD PRESSURE: 116 MMHG | HEART RATE: 84 BPM | HEIGHT: 72 IN | WEIGHT: 185 LBS | RESPIRATION RATE: 18 BRPM | BODY MASS INDEX: 25.06 KG/M2

## 2022-06-06 DIAGNOSIS — M79.2 NEURITIS: ICD-10-CM

## 2022-06-06 DIAGNOSIS — G35 MS (MULTIPLE SCLEROSIS): ICD-10-CM

## 2022-06-06 DIAGNOSIS — M79.671 CHRONIC PAIN IN RIGHT FOOT: Primary | ICD-10-CM

## 2022-06-06 DIAGNOSIS — G89.29 CHRONIC PAIN IN RIGHT FOOT: Primary | ICD-10-CM

## 2022-06-06 DIAGNOSIS — M19.071 PRIMARY OSTEOARTHRITIS OF RIGHT FOOT: ICD-10-CM

## 2022-06-06 LAB
AMPHET+METHAMPHET UR QL: NEGATIVE
BARBITURATES UR QL SCN>200 NG/ML: NEGATIVE
BENZODIAZ UR QL SCN>200 NG/ML: NEGATIVE
BZE UR QL SCN: NEGATIVE
CANNABINOIDS UR QL SCN: NEGATIVE
CREAT UR-MCNC: 243.7 MG/DL (ref 23–375)
METHADONE UR QL SCN>300 NG/ML: NEGATIVE
OPIATES UR QL SCN: ABNORMAL
PCP UR QL SCN>25 NG/ML: NEGATIVE
TOXICOLOGY INFORMATION: ABNORMAL

## 2022-06-06 PROCEDURE — 99213 OFFICE O/P EST LOW 20 MIN: CPT | Mod: S$PBB,,, | Performed by: PODIATRIST

## 2022-06-06 PROCEDURE — 99999 PR PBB SHADOW E&M-EST. PATIENT-LVL V: CPT | Mod: PBBFAC,,, | Performed by: PODIATRIST

## 2022-06-06 PROCEDURE — 99215 OFFICE O/P EST HI 40 MIN: CPT | Mod: PBBFAC | Performed by: PODIATRIST

## 2022-06-06 PROCEDURE — 99999 PR PBB SHADOW E&M-EST. PATIENT-LVL V: ICD-10-PCS | Mod: PBBFAC,,, | Performed by: PODIATRIST

## 2022-06-06 PROCEDURE — 99213 PR OFFICE/OUTPT VISIT, EST, LEVL III, 20-29 MIN: ICD-10-PCS | Mod: S$PBB,,, | Performed by: PODIATRIST

## 2022-06-06 PROCEDURE — 80307 DRUG TEST PRSMV CHEM ANLYZR: CPT | Performed by: PODIATRIST

## 2022-06-06 RX ORDER — OXYCODONE AND ACETAMINOPHEN 10; 325 MG/1; MG/1
1 TABLET ORAL 3 TIMES DAILY
Qty: 90 TABLET | Refills: 0 | Status: SHIPPED | OUTPATIENT
Start: 2022-06-06 | End: 2022-07-06 | Stop reason: SDUPTHER

## 2022-06-06 RX ORDER — CLOTRIMAZOLE 1 %
CREAM (GRAM) TOPICAL
COMMUNITY
Start: 2021-11-02 | End: 2022-06-06 | Stop reason: SDUPTHER

## 2022-06-06 RX ORDER — QUETIAPINE FUMARATE 25 MG/1
12.5 TABLET, FILM COATED ORAL
COMMUNITY
Start: 2022-02-01 | End: 2022-06-06 | Stop reason: SDUPTHER

## 2022-06-06 RX ORDER — BUSPIRONE HYDROCHLORIDE 15 MG/1
15 TABLET ORAL
COMMUNITY
Start: 2021-10-13 | End: 2022-06-06 | Stop reason: SDUPTHER

## 2022-06-06 RX ORDER — PREGABALIN 150 MG/1
150 CAPSULE ORAL 3 TIMES DAILY
Qty: 90 CAPSULE | Refills: 5 | Status: SHIPPED | OUTPATIENT
Start: 2022-06-06 | End: 2022-07-06

## 2022-06-06 RX ORDER — TERIFLUNOMIDE 14 MG/1
14 TABLET, FILM COATED ORAL
COMMUNITY
Start: 2022-02-21 | End: 2022-06-06 | Stop reason: ALTCHOICE

## 2022-06-06 RX ORDER — MODAFINIL 200 MG/1
TABLET ORAL
COMMUNITY
Start: 2022-02-23 | End: 2022-06-06 | Stop reason: SDUPTHER

## 2022-06-06 RX ORDER — LIDOCAINE 50 MG/G
OINTMENT TOPICAL
COMMUNITY
Start: 2021-10-06 | End: 2022-06-06 | Stop reason: SDUPTHER

## 2022-06-06 NOTE — TELEPHONE ENCOUNTER
----- Message from Jah Torres DPM sent at 6/6/2022 10:09 AM CDT -----  Please call patient advise his drug screen is fine his prescriptions have been sent.

## 2022-06-07 NOTE — PROGRESS NOTES
Subjective:       Patient ID: Sg Sesay is a 46 y.o. male.    Chief Complaint: Follow-up, Foot Pain, Foot Swelling, and Medication Refill  Patient presents today with complaint of pain on the right foot.  Patient also has a history of MS as well as chronic pain of both feet with associated neuritis.      Follow-up  Associated symptoms include joint swelling.   Foot Pain  Associated symptoms include joint swelling.   Medication Refill  Associated symptoms include joint swelling.   Toe Pain     Ankle Pain        Review of Systems   Musculoskeletal: Positive for back pain, gait problem and joint swelling.   All other systems reviewed and are negative.      Objective:      Physical Exam  Vitals and nursing note reviewed.   Constitutional:       Appearance: Normal appearance. He is well-developed.   Cardiovascular:      Rate and Rhythm: Normal rate and regular rhythm.      Pulses: Normal pulses.           Dorsalis pedis pulses are 2+ on the right side and 2+ on the left side.        Posterior tibial pulses are 2+ on the right side and 2+ on the left side.      Heart sounds: Normal heart sounds.   Pulmonary:      Effort: Pulmonary effort is normal.      Breath sounds: Normal breath sounds.   Musculoskeletal:         General: Swelling, tenderness and deformity present.      Right foot: Decreased range of motion. Deformity present.        Feet:    Feet:      Right foot:      Protective Sensation: 4 sites tested. 4 sites sensed.      Skin integrity: Erythema and warmth present.      Left foot:      Protective Sensation: 4 sites tested. 4 sites sensed.   Skin:     General: Skin is warm.      Capillary Refill: Capillary refill takes less than 2 seconds.   Neurological:      General: No focal deficit present.      Mental Status: He is alert.   Psychiatric:         Behavior: Behavior normal.         Thought Content: Thought content normal.         Judgment: Judgment normal.                                                                 Assessment:       1. Chronic pain in right foot    2. Neuritis    3. MS (multiple sclerosis)    4. Primary osteoarthritis of right foot        Plan:       Patient presents today with complaint of pain on the right foot.  Patient also has a history of MS as well as chronic pain of both feet with associated neuritis.  Patient states he is wearing his orthotics at all times and states he cannot go without them I had cut out in area to cradle the base of the 5th metatarsal cuboid on the right foot he states this has helped considerably while his feet still do ache on a regular basis.  Patient is currently under my care for pain management patient has any updated pain management contract on file and his p.m.p was evaluated a drug screen is up-to-date new prescription dispense. I  did discuss with the patient a lateral column fusion that would entail fusion at the base of the 5th metatarsal and cuboid area I advised the patient this is not typically something that is done on a regular basis however he has instability in this area the base of the 5th metatarsal has plantar flexed some degree which is become very prominent causing the patient a lot of pain and discomfort.  Patient indicated that there is no way that he can walk without his supports he states it feels like there is a torch being held to the bottom of his foot he does take his Percocet as directed as well as his Lyrica.  Patient's drug screen was evaluated and is satisfactory a new prescription was sent.  Patient states he is going to have to do something surgically to address the right foot he cannot continue like this he states it is continually painful he just got up before his office visit in came in and it is already significantly swollen.  Patient states he is thinking about pursuing surgery at the end of the summer.  Follow-up will be 1 month.  This note was created using [employer] voice  recognition software that occasionally misinterpreted phrases or words.

## 2022-06-24 ENCOUNTER — PATIENT MESSAGE (OUTPATIENT)
Dept: PSYCHIATRY | Facility: CLINIC | Age: 47
End: 2022-06-24
Payer: MEDICARE

## 2022-07-06 ENCOUNTER — OFFICE VISIT (OUTPATIENT)
Dept: PODIATRY | Facility: CLINIC | Age: 47
End: 2022-07-06
Payer: MEDICARE

## 2022-07-06 VITALS
DIASTOLIC BLOOD PRESSURE: 67 MMHG | RESPIRATION RATE: 16 BRPM | HEART RATE: 75 BPM | SYSTOLIC BLOOD PRESSURE: 113 MMHG | HEIGHT: 72 IN | WEIGHT: 185 LBS | BODY MASS INDEX: 25.06 KG/M2

## 2022-07-06 DIAGNOSIS — M19.071 PRIMARY OSTEOARTHRITIS OF RIGHT FOOT: ICD-10-CM

## 2022-07-06 DIAGNOSIS — G35 MS (MULTIPLE SCLEROSIS): ICD-10-CM

## 2022-07-06 DIAGNOSIS — M25.571 PAIN, JOINT, FOOT, RIGHT: Primary | ICD-10-CM

## 2022-07-06 DIAGNOSIS — M79.2 NEURITIS: ICD-10-CM

## 2022-07-06 PROCEDURE — 99999 PR PBB SHADOW E&M-EST. PATIENT-LVL V: CPT | Mod: PBBFAC,,, | Performed by: PODIATRIST

## 2022-07-06 PROCEDURE — 99215 OFFICE O/P EST HI 40 MIN: CPT | Mod: PBBFAC | Performed by: PODIATRIST

## 2022-07-06 PROCEDURE — 99214 OFFICE O/P EST MOD 30 MIN: CPT | Mod: S$PBB,,, | Performed by: PODIATRIST

## 2022-07-06 PROCEDURE — 99999 PR PBB SHADOW E&M-EST. PATIENT-LVL V: ICD-10-PCS | Mod: PBBFAC,,, | Performed by: PODIATRIST

## 2022-07-06 PROCEDURE — 99214 PR OFFICE/OUTPT VISIT, EST, LEVL IV, 30-39 MIN: ICD-10-PCS | Mod: S$PBB,,, | Performed by: PODIATRIST

## 2022-07-06 RX ORDER — OXYCODONE AND ACETAMINOPHEN 10; 325 MG/1; MG/1
1 TABLET ORAL 3 TIMES DAILY
Qty: 90 TABLET | Refills: 0 | Status: SHIPPED | OUTPATIENT
Start: 2022-07-06 | End: 2022-08-05

## 2022-07-06 RX ORDER — TERIFLUNOMIDE 14 MG/1
1 TABLET, FILM COATED ORAL DAILY
COMMUNITY
Start: 2022-06-23 | End: 2023-09-25 | Stop reason: SDUPTHER

## 2022-07-09 NOTE — PROGRESS NOTES
Subjective:       Patient ID: Sg Sesay is a 46 y.o. male.    Chief Complaint: Follow-up, Foot Pain, Foot Swelling, and Medication Refill  Patient presents today with complaint of pain on the right foot.  Patient also has a history of MS as well as chronic pain of both feet with associated neuritis.      Follow-up  Associated symptoms include joint swelling.   Foot Pain  Associated symptoms include joint swelling.   Medication Refill  Associated symptoms include joint swelling.   Toe Pain     Ankle Pain        Review of Systems   Musculoskeletal: Positive for back pain, gait problem and joint swelling.   All other systems reviewed and are negative.      Objective:      Physical Exam  Vitals and nursing note reviewed.   Constitutional:       Appearance: Normal appearance. He is well-developed.   Cardiovascular:      Rate and Rhythm: Normal rate and regular rhythm.      Pulses: Normal pulses.           Dorsalis pedis pulses are 2+ on the right side and 2+ on the left side.        Posterior tibial pulses are 2+ on the right side and 2+ on the left side.      Heart sounds: Normal heart sounds.   Pulmonary:      Effort: Pulmonary effort is normal.      Breath sounds: Normal breath sounds.   Musculoskeletal:         General: Swelling, tenderness and deformity present.      Right foot: Decreased range of motion. Deformity present.        Feet:    Feet:      Right foot:      Protective Sensation: 4 sites tested. 4 sites sensed.      Skin integrity: Erythema and warmth present.      Left foot:      Protective Sensation: 4 sites tested. 4 sites sensed.   Skin:     General: Skin is warm.      Capillary Refill: Capillary refill takes less than 2 seconds.   Neurological:      General: No focal deficit present.      Mental Status: He is alert.   Psychiatric:         Behavior: Behavior normal.         Thought Content: Thought content normal.         Judgment: Judgment normal.                                                                                     Assessment:       1. Pain, joint, foot, right    2. Primary osteoarthritis of right foot    3. Neuritis    4. MS (multiple sclerosis)        Plan:       Patient presents today with complaint of pain on the right foot.  Patient also has a history of MS as well as chronic pain of both feet with associated neuritis.  Patient states he is wearing his orthotics at all times and states he cannot go without them I had cut out in area to cradle the base of the 5th metatarsal cuboid on the right foot he states this has helped considerably while his feet still do ache on a regular basis.  Patient is currently under my care for pain management patient has any updated pain management contract on file and his p.m.p was evaluated a drug screen is up-to-date new prescription dispense. I  did discuss with the patient a lateral column fusion that would entail fusion at the base of the 5th metatarsal and cuboid area I advised the patient this is not typically something that is done on a regular basis however he has instability in this area the base of the 5th metatarsal has plantar flexed some degree which is become very prominent causing the patient a lot of pain and discomfort.  Patient indicated that there is no way that he can walk without his supports he states it feels like there is a torch being held to the bottom of his foot he does take his Percocet as directed as well as his Lyrica.  Patient's drug screen was evaluated and is satisfactory a new prescription was sent.  Patient states he is going to have to do something surgically to address the right foot he cannot continue like this he states it is continually painful he just got up before his office visit in came in and it is already significantly swollen.  Patient's pain management contract was updated today as it is every July.  Patient states his pain is getting worse with activity he has to  do something he does want to pursue surgery for fusion of the lateral column of the right foot we have tentatively scheduled surgery for August 12, 2022 I will see the patient for preoperative evaluation prior to that time.  This note was created using [employer] voice recognition software that occasionally misinterpreted phrases or words.

## 2022-08-08 ENCOUNTER — OFFICE VISIT (OUTPATIENT)
Dept: PODIATRY | Facility: CLINIC | Age: 47
End: 2022-08-08
Payer: MEDICARE

## 2022-08-08 ENCOUNTER — HOSPITAL ENCOUNTER (OUTPATIENT)
Dept: RADIOLOGY | Facility: HOSPITAL | Age: 47
Discharge: HOME | End: 2022-08-08
Attending: PODIATRIST
Payer: MEDICARE

## 2022-08-08 VITALS
BODY MASS INDEX: 25.06 KG/M2 | SYSTOLIC BLOOD PRESSURE: 148 MMHG | DIASTOLIC BLOOD PRESSURE: 84 MMHG | WEIGHT: 185 LBS | HEART RATE: 72 BPM | RESPIRATION RATE: 17 BRPM | HEIGHT: 72 IN | TEMPERATURE: 98 F

## 2022-08-08 DIAGNOSIS — S93.311A CUBOID SYNDROME OF RIGHT FOOT: Primary | ICD-10-CM

## 2022-08-08 DIAGNOSIS — T84.84XA PAINFUL ORTHOPAEDIC HARDWARE: ICD-10-CM

## 2022-08-08 DIAGNOSIS — M25.571 PAIN, JOINT, FOOT, RIGHT: ICD-10-CM

## 2022-08-08 DIAGNOSIS — S93.311A CUBOID SYNDROME OF RIGHT FOOT: ICD-10-CM

## 2022-08-08 DIAGNOSIS — Z01.818 PRE-OP EXAM: ICD-10-CM

## 2022-08-08 DIAGNOSIS — M35.7 HYPERMOBILE JOINT SYNDROME OF RIGHT FOOT: ICD-10-CM

## 2022-08-08 PROCEDURE — 73630 X-RAY EXAM OF FOOT: CPT | Mod: 26,RT,, | Performed by: RADIOLOGY

## 2022-08-08 PROCEDURE — 99215 PR OFFICE/OUTPT VISIT, EST, LEVL V, 40-54 MIN: ICD-10-PCS | Mod: S$PBB,,, | Performed by: PODIATRIST

## 2022-08-08 PROCEDURE — 99999 PR PBB SHADOW E&M-EST. PATIENT-LVL V: ICD-10-PCS | Mod: PBBFAC,,, | Performed by: PODIATRIST

## 2022-08-08 PROCEDURE — 99215 OFFICE O/P EST HI 40 MIN: CPT | Mod: PBBFAC | Performed by: PODIATRIST

## 2022-08-08 PROCEDURE — 73630 XR FOOT COMPLETE 3 VIEW RIGHT: ICD-10-PCS | Mod: 26,RT,, | Performed by: RADIOLOGY

## 2022-08-08 PROCEDURE — 99215 OFFICE O/P EST HI 40 MIN: CPT | Mod: S$PBB,,, | Performed by: PODIATRIST

## 2022-08-08 PROCEDURE — 99999 PR PBB SHADOW E&M-EST. PATIENT-LVL V: CPT | Mod: PBBFAC,,, | Performed by: PODIATRIST

## 2022-08-08 PROCEDURE — 73630 X-RAY EXAM OF FOOT: CPT | Mod: TC,RT

## 2022-08-08 RX ORDER — SODIUM CHLORIDE, SODIUM LACTATE, POTASSIUM CHLORIDE, CALCIUM CHLORIDE 600; 310; 30; 20 MG/100ML; MG/100ML; MG/100ML; MG/100ML
INJECTION, SOLUTION INTRAVENOUS CONTINUOUS
Status: CANCELLED | OUTPATIENT
Start: 2022-08-12

## 2022-08-08 RX ORDER — ONDANSETRON HYDROCHLORIDE 8 MG/1
8 TABLET, FILM COATED ORAL EVERY 8 HOURS PRN
Qty: 42 TABLET | Refills: 1 | Status: SHIPPED | OUTPATIENT
Start: 2022-08-08 | End: 2022-08-22

## 2022-08-08 RX ORDER — SULFAMETHOXAZOLE AND TRIMETHOPRIM 800; 160 MG/1; MG/1
1 TABLET ORAL 2 TIMES DAILY
Qty: 28 TABLET | Refills: 0 | Status: SHIPPED | OUTPATIENT
Start: 2022-08-08 | End: 2022-08-22

## 2022-08-08 NOTE — PROGRESS NOTES
Subjective:       Patient ID: Sg Sesay is a 46 y.o. male.    Chief Complaint: Pre-op Exam and Foot Pain  Patient presents today with complaint of pain on the right foot.  Patient also has a history of MS as well as chronic pain of both feet with associated neuritis.      Follow-up  Associated symptoms include joint swelling.   Foot Pain  Associated symptoms include joint swelling.   Medication Refill  Associated symptoms include joint swelling.   Toe Pain     Ankle Pain        Review of Systems   Musculoskeletal: Positive for back pain, gait problem and joint swelling.   All other systems reviewed and are negative.      Objective:      Physical Exam  Vitals and nursing note reviewed.   Constitutional:       Appearance: Normal appearance. He is well-developed.   Cardiovascular:      Rate and Rhythm: Normal rate and regular rhythm.      Pulses: Normal pulses.           Dorsalis pedis pulses are 2+ on the right side and 2+ on the left side.        Posterior tibial pulses are 2+ on the right side and 2+ on the left side.      Heart sounds: Normal heart sounds.   Pulmonary:      Effort: Pulmonary effort is normal.      Breath sounds: Normal breath sounds.   Musculoskeletal:         General: Swelling, tenderness and deformity present.      Right foot: Decreased range of motion. Deformity present.        Feet:    Feet:      Right foot:      Protective Sensation: 4 sites tested. 4 sites sensed.      Skin integrity: Erythema and warmth present.      Left foot:      Protective Sensation: 4 sites tested. 4 sites sensed.   Skin:                         General: Skin is warm.      Capillary Refill: Capillary refill takes less than 2 seconds.   Neurological:      General: No focal deficit present.      Mental Status: He is alert.   Psychiatric:         Behavior: Behavior normal.         Thought Content: Thought content normal.         Judgment: Judgment normal.          Assessment:       1. Cuboid syndrome of right foot     2. Pain, joint, foot, right    3. Painful orthopaedic hardware    4. Hypermobile joint syndrome of right foot    5. Pre-op exam        Plan:       Patient presents today with complaint of pain on the right foot.  Patient also has a history of MS as well as chronic pain of both feet with associated neuritis.  Patient states he is wearing his orthotics at all times and states he cannot go without them I had cut out in area to cradle the base of the 5th metatarsal cuboid on the right foot he states this has helped considerably while his feet still do ache on a regular basis.  Patient is currently under my care for pain management patient has any updated pain management contract on file and his p.m.p was evaluated a drug screen is up-to-date new prescription dispense. I  did discuss with the patient a lateral column fusion that would entail fusion at the base of the 5th metatarsal and cuboid area I advised the patient this is not typically something that is done on a regular basis however he has instability in this area the base of the 5th metatarsal has plantar flexed some degree which is become very prominent causing the patient a lot of pain and discomfort.  Patient indicated that there is no way that he can walk without his supports he states it feels like there is a torch being held to the bottom of his foot he does take his Percocet as directed as well as his Lyrica.  Patient states at this point he would like to proceed with surgery as discussed.  Decision for surgery was made today.Patient presents today for preoperative history and physical in discussion all aspects of surgery were discussed with the patient no guarantees were given written or implied all potential complications including but not limited to delayed healing nonhealing postoperative pain infection recurrence were discussed with the patient in detail. All aspect of the patient's recovery time involved in recovery patient responsibility is  involving recovery were also discussed in detail. Patient advised failure to comply with postoperative care will jeopardize surgical outcome.  All aspects of surgery were discussed at length and in detail I have advised the patient I will remove as much as possible of the bone staples that he has fusing the calcaneal cuboid joint to get these are the way for fusion of the lateral column.  I did discuss with the patient type of fixation necessary as well as the potential for bone grafts to be necessary he understands he is going to be nonweightbearing for a minimum of 4 weeks followed by several weeks of partial weight-bearing in a boot before gradual return to activity.  Patient already has prescription for Percocet which has been prescribed by me as he is under my care for pain management his p.m.p is up-to-date as is his pain management contract.  Patient was given additional prescriptions for Bactrim and Zofran he already has a fracture boot and a knee scooter he will start taking aspirin 24 hours after surgery patient understands the importance of ice and elevation postoperatively as well as taking over-the-counter ibuprofen or Motrin 200 mg in between doses of the pain medication.  Preoperative lab work has been ordered will be reviewed prior to surgery.  Patient will be NPO after midnight.  Patient's surgery has been scheduled for August 12, 2022 he has been advised to contact us with any problems questions or concerns prior to surgery.    This note was created using [employer] voice recognition software that occasionally misinterpreted phrases or words.

## 2022-08-08 NOTE — PATIENT INSTRUCTIONS
Nothing to eat or drink after midnight.  If you take medication for high blood pressure take this in the morning with a sip of water prior to surgery.     Arrive at out patient registration at 6:30 am

## 2022-08-08 NOTE — H&P (VIEW-ONLY)
Subjective:       Patient ID: Sg Sesay is a 46 y.o. male.    Chief Complaint: Pre-op Exam and Foot Pain  Patient presents today with complaint of pain on the right foot.  Patient also has a history of MS as well as chronic pain of both feet with associated neuritis.      Follow-up  Associated symptoms include joint swelling.   Foot Pain  Associated symptoms include joint swelling.   Medication Refill  Associated symptoms include joint swelling.   Toe Pain     Ankle Pain        Review of Systems   Musculoskeletal: Positive for back pain, gait problem and joint swelling.   All other systems reviewed and are negative.      Objective:      Physical Exam  Vitals and nursing note reviewed.   Constitutional:       Appearance: Normal appearance. He is well-developed.   Cardiovascular:      Rate and Rhythm: Normal rate and regular rhythm.      Pulses: Normal pulses.           Dorsalis pedis pulses are 2+ on the right side and 2+ on the left side.        Posterior tibial pulses are 2+ on the right side and 2+ on the left side.      Heart sounds: Normal heart sounds.   Pulmonary:      Effort: Pulmonary effort is normal.      Breath sounds: Normal breath sounds.   Musculoskeletal:         General: Swelling, tenderness and deformity present.      Right foot: Decreased range of motion. Deformity present.        Feet:    Feet:      Right foot:      Protective Sensation: 4 sites tested. 4 sites sensed.      Skin integrity: Erythema and warmth present.      Left foot:      Protective Sensation: 4 sites tested. 4 sites sensed.   Skin:                         General: Skin is warm.      Capillary Refill: Capillary refill takes less than 2 seconds.   Neurological:      General: No focal deficit present.      Mental Status: He is alert.   Psychiatric:         Behavior: Behavior normal.         Thought Content: Thought content normal.         Judgment: Judgment normal.          Assessment:       1. Cuboid syndrome of right foot     2. Pain, joint, foot, right    3. Painful orthopaedic hardware    4. Hypermobile joint syndrome of right foot    5. Pre-op exam        Plan:       Patient presents today with complaint of pain on the right foot.  Patient also has a history of MS as well as chronic pain of both feet with associated neuritis.  Patient states he is wearing his orthotics at all times and states he cannot go without them I had cut out in area to cradle the base of the 5th metatarsal cuboid on the right foot he states this has helped considerably while his feet still do ache on a regular basis.  Patient is currently under my care for pain management patient has any updated pain management contract on file and his p.m.p was evaluated a drug screen is up-to-date new prescription dispense. I  did discuss with the patient a lateral column fusion that would entail fusion at the base of the 5th metatarsal and cuboid area I advised the patient this is not typically something that is done on a regular basis however he has instability in this area the base of the 5th metatarsal has plantar flexed some degree which is become very prominent causing the patient a lot of pain and discomfort.  Patient indicated that there is no way that he can walk without his supports he states it feels like there is a torch being held to the bottom of his foot he does take his Percocet as directed as well as his Lyrica.  Patient states at this point he would like to proceed with surgery as discussed.  Decision for surgery was made today.Patient presents today for preoperative history and physical in discussion all aspects of surgery were discussed with the patient no guarantees were given written or implied all potential complications including but not limited to delayed healing nonhealing postoperative pain infection recurrence were discussed with the patient in detail. All aspect of the patient's recovery time involved in recovery patient responsibility is  involving recovery were also discussed in detail. Patient advised failure to comply with postoperative care will jeopardize surgical outcome.  All aspects of surgery were discussed at length and in detail I have advised the patient I will remove as much as possible of the bone staples that he has fusing the calcaneal cuboid joint to get these are the way for fusion of the lateral column.  I did discuss with the patient type of fixation necessary as well as the potential for bone grafts to be necessary he understands he is going to be nonweightbearing for a minimum of 4 weeks followed by several weeks of partial weight-bearing in a boot before gradual return to activity.  Patient already has prescription for Percocet which has been prescribed by me as he is under my care for pain management his p.m.p is up-to-date as is his pain management contract.  Patient was given additional prescriptions for Bactrim and Zofran he already has a fracture boot and a knee scooter he will start taking aspirin 24 hours after surgery patient understands the importance of ice and elevation postoperatively as well as taking over-the-counter ibuprofen or Motrin 200 mg in between doses of the pain medication.  Preoperative lab work has been ordered will be reviewed prior to surgery.  Patient will be NPO after midnight.  Patient's surgery has been scheduled for August 12, 2022 he has been advised to contact us with any problems questions or concerns prior to surgery.    This note was created using [employer] voice recognition software that occasionally misinterpreted phrases or words.

## 2022-08-09 ENCOUNTER — LAB VISIT (OUTPATIENT)
Dept: LAB | Facility: HOSPITAL | Age: 47
End: 2022-08-09
Attending: PODIATRIST
Payer: MEDICARE

## 2022-08-09 DIAGNOSIS — M25.571 PAIN, JOINT, FOOT, RIGHT: ICD-10-CM

## 2022-08-09 DIAGNOSIS — S93.311A CUBOID SYNDROME OF RIGHT FOOT: ICD-10-CM

## 2022-08-09 DIAGNOSIS — Z01.818 PRE-OP EXAM: ICD-10-CM

## 2022-08-09 DIAGNOSIS — M35.7 HYPERMOBILE JOINT SYNDROME OF RIGHT FOOT: ICD-10-CM

## 2022-08-09 DIAGNOSIS — T84.84XA PAINFUL ORTHOPAEDIC HARDWARE: ICD-10-CM

## 2022-08-09 LAB
ALBUMIN SERPL BCP-MCNC: 3.7 G/DL (ref 3.5–5.2)
ALP SERPL-CCNC: 109 U/L (ref 55–135)
ALT SERPL W/O P-5'-P-CCNC: 27 U/L (ref 10–44)
ANION GAP SERPL CALC-SCNC: 11 MMOL/L (ref 8–16)
AST SERPL-CCNC: 27 U/L (ref 10–40)
BASOPHILS # BLD AUTO: 0.04 K/UL (ref 0–0.2)
BASOPHILS NFR BLD: 1.2 % (ref 0–1.9)
BILIRUB SERPL-MCNC: 0.7 MG/DL (ref 0.1–1)
BUN SERPL-MCNC: 11 MG/DL (ref 6–20)
CALCIUM SERPL-MCNC: 8.8 MG/DL (ref 8.7–10.5)
CHLORIDE SERPL-SCNC: 105 MMOL/L (ref 95–110)
CO2 SERPL-SCNC: 27 MMOL/L (ref 23–29)
CREAT SERPL-MCNC: 1.2 MG/DL (ref 0.5–1.4)
DIFFERENTIAL METHOD: ABNORMAL
EOSINOPHIL # BLD AUTO: 0.1 K/UL (ref 0–0.5)
EOSINOPHIL NFR BLD: 2.6 % (ref 0–8)
ERYTHROCYTE [DISTWIDTH] IN BLOOD BY AUTOMATED COUNT: 13.4 % (ref 11.5–14.5)
EST. GFR  (NO RACE VARIABLE): >60 ML/MIN/1.73 M^2
GLUCOSE SERPL-MCNC: 93 MG/DL (ref 70–110)
HCT VFR BLD AUTO: 40.6 % (ref 40–54)
HGB BLD-MCNC: 13.9 G/DL (ref 14–18)
IMM GRANULOCYTES # BLD AUTO: 0.01 K/UL (ref 0–0.04)
IMM GRANULOCYTES NFR BLD AUTO: 0.3 % (ref 0–0.5)
LYMPHOCYTES # BLD AUTO: 1.6 K/UL (ref 1–4.8)
LYMPHOCYTES NFR BLD: 46.6 % (ref 18–48)
MCH RBC QN AUTO: 30.2 PG (ref 27–31)
MCHC RBC AUTO-ENTMCNC: 34.2 G/DL (ref 32–36)
MCV RBC AUTO: 88 FL (ref 82–98)
MONOCYTES # BLD AUTO: 0.3 K/UL (ref 0.3–1)
MONOCYTES NFR BLD: 8.8 % (ref 4–15)
NEUTROPHILS # BLD AUTO: 1.4 K/UL (ref 1.8–7.7)
NEUTROPHILS NFR BLD: 40.5 % (ref 38–73)
NRBC BLD-RTO: 0 /100 WBC
PLATELET # BLD AUTO: 182 K/UL (ref 150–450)
PMV BLD AUTO: 10.9 FL (ref 9.2–12.9)
POTASSIUM SERPL-SCNC: 3.9 MMOL/L (ref 3.5–5.1)
PROT SERPL-MCNC: 7.2 G/DL (ref 6–8.4)
RBC # BLD AUTO: 4.6 M/UL (ref 4.6–6.2)
SODIUM SERPL-SCNC: 143 MMOL/L (ref 136–145)
WBC # BLD AUTO: 3.41 K/UL (ref 3.9–12.7)

## 2022-08-09 PROCEDURE — 36415 COLL VENOUS BLD VENIPUNCTURE: CPT | Performed by: PODIATRIST

## 2022-08-09 PROCEDURE — 85025 COMPLETE CBC W/AUTO DIFF WBC: CPT | Performed by: PODIATRIST

## 2022-08-09 PROCEDURE — 80053 COMPREHEN METABOLIC PANEL: CPT | Performed by: PODIATRIST

## 2022-08-12 ENCOUNTER — ANESTHESIA EVENT (OUTPATIENT)
Dept: SURGERY | Facility: HOSPITAL | Age: 47
End: 2022-08-12
Payer: MEDICARE

## 2022-08-12 ENCOUNTER — ANESTHESIA (OUTPATIENT)
Dept: SURGERY | Facility: HOSPITAL | Age: 47
End: 2022-08-12
Payer: MEDICARE

## 2022-08-12 ENCOUNTER — HOSPITAL ENCOUNTER (OUTPATIENT)
Facility: HOSPITAL | Age: 47
Discharge: HOME | End: 2022-08-12
Attending: PODIATRIST | Admitting: PODIATRIST
Payer: MEDICARE

## 2022-08-12 VITALS
HEART RATE: 90 BPM | RESPIRATION RATE: 16 BRPM | BODY MASS INDEX: 23.7 KG/M2 | OXYGEN SATURATION: 100 % | HEIGHT: 72 IN | DIASTOLIC BLOOD PRESSURE: 97 MMHG | WEIGHT: 175 LBS | TEMPERATURE: 98 F | SYSTOLIC BLOOD PRESSURE: 150 MMHG

## 2022-08-12 DIAGNOSIS — T84.84XA PAINFUL ORTHOPAEDIC HARDWARE: ICD-10-CM

## 2022-08-12 DIAGNOSIS — M79.2 NEURITIS: ICD-10-CM

## 2022-08-12 DIAGNOSIS — M79.671 FOOT PAIN, RIGHT: ICD-10-CM

## 2022-08-12 DIAGNOSIS — M35.7 HYPERMOBILE JOINT SYNDROME OF RIGHT FOOT: ICD-10-CM

## 2022-08-12 DIAGNOSIS — S93.311A CUBOID SYNDROME OF RIGHT FOOT: ICD-10-CM

## 2022-08-12 DIAGNOSIS — M19.071 PRIMARY OSTEOARTHRITIS OF RIGHT FOOT: ICD-10-CM

## 2022-08-12 DIAGNOSIS — M25.571 PAIN, JOINT, FOOT, RIGHT: ICD-10-CM

## 2022-08-12 DIAGNOSIS — Z01.818 PRE-OP EXAM: ICD-10-CM

## 2022-08-12 PROCEDURE — 28730 PR FUSION FOOT BONES,MIDTARSAL,MULTI: ICD-10-PCS | Mod: RT,,, | Performed by: PODIATRIST

## 2022-08-12 PROCEDURE — D9220A PRA ANESTHESIA: Mod: CRNA,,, | Performed by: NURSE ANESTHETIST, CERTIFIED REGISTERED

## 2022-08-12 PROCEDURE — 25000003 PHARM REV CODE 250: Performed by: PODIATRIST

## 2022-08-12 PROCEDURE — 25000003 PHARM REV CODE 250: Performed by: ANESTHESIOLOGY

## 2022-08-12 PROCEDURE — 63600175 PHARM REV CODE 636 W HCPCS: Performed by: ANESTHESIOLOGY

## 2022-08-12 PROCEDURE — 36000708 HC OR TIME LEV III 1ST 15 MIN: Performed by: PODIATRIST

## 2022-08-12 PROCEDURE — 71000015 HC POSTOP RECOV 1ST HR: Performed by: PODIATRIST

## 2022-08-12 PROCEDURE — 20680 REMOVAL OF IMPLANT DEEP: CPT | Mod: 51,RT,, | Performed by: PODIATRIST

## 2022-08-12 PROCEDURE — 25000003 PHARM REV CODE 250

## 2022-08-12 PROCEDURE — 71000016 HC POSTOP RECOV ADDL HR: Performed by: PODIATRIST

## 2022-08-12 PROCEDURE — 63600175 PHARM REV CODE 636 W HCPCS

## 2022-08-12 PROCEDURE — 36000709 HC OR TIME LEV III EA ADD 15 MIN: Performed by: PODIATRIST

## 2022-08-12 PROCEDURE — 63600175 PHARM REV CODE 636 W HCPCS: Performed by: PODIATRIST

## 2022-08-12 PROCEDURE — 71000033 HC RECOVERY, INTIAL HOUR: Performed by: PODIATRIST

## 2022-08-12 PROCEDURE — 28730 FUSION OF FOOT BONES: CPT | Mod: RT,,, | Performed by: PODIATRIST

## 2022-08-12 PROCEDURE — 63600175 PHARM REV CODE 636 W HCPCS: Performed by: NURSE ANESTHETIST, CERTIFIED REGISTERED

## 2022-08-12 PROCEDURE — C1713 ANCHOR/SCREW BN/BN,TIS/BN: HCPCS | Performed by: PODIATRIST

## 2022-08-12 PROCEDURE — 37000009 HC ANESTHESIA EA ADD 15 MINS: Performed by: PODIATRIST

## 2022-08-12 PROCEDURE — D9220A PRA ANESTHESIA: ICD-10-PCS | Mod: ANES,,, | Performed by: ANESTHESIOLOGY

## 2022-08-12 PROCEDURE — 37000008 HC ANESTHESIA 1ST 15 MINUTES: Performed by: PODIATRIST

## 2022-08-12 PROCEDURE — 27800903 OPTIME MED/SURG SUP & DEVICES OTHER IMPLANTS: Performed by: PODIATRIST

## 2022-08-12 PROCEDURE — 27201423 OPTIME MED/SURG SUP & DEVICES STERILE SUPPLY: Performed by: PODIATRIST

## 2022-08-12 PROCEDURE — 20680 PR REMOVAL DEEP IMPLANT: ICD-10-PCS | Mod: 51,RT,, | Performed by: PODIATRIST

## 2022-08-12 PROCEDURE — D9220A PRA ANESTHESIA: ICD-10-PCS | Mod: CRNA,,, | Performed by: NURSE ANESTHETIST, CERTIFIED REGISTERED

## 2022-08-12 PROCEDURE — C1769 GUIDE WIRE: HCPCS | Performed by: PODIATRIST

## 2022-08-12 PROCEDURE — D9220A PRA ANESTHESIA: Mod: ANES,,, | Performed by: ANESTHESIOLOGY

## 2022-08-12 DEVICE — IMPLANTABLE DEVICE: Type: IMPLANTABLE DEVICE | Site: FOOT | Status: FUNCTIONAL

## 2022-08-12 DEVICE — SCREW R3CON LOK PLATE 3.5X18MM: Type: IMPLANTABLE DEVICE | Site: FOOT | Status: FUNCTIONAL

## 2022-08-12 DEVICE — SCREW R3CON LOK PLATE 3.5X16MM: Type: IMPLANTABLE DEVICE | Site: FOOT | Status: FUNCTIONAL

## 2022-08-12 RX ORDER — MORPHINE SULFATE 4 MG/ML
2 INJECTION, SOLUTION INTRAMUSCULAR; INTRAVENOUS EVERY 5 MIN PRN
Status: DISCONTINUED | OUTPATIENT
Start: 2022-08-12 | End: 2022-08-12 | Stop reason: HOSPADM

## 2022-08-12 RX ORDER — SODIUM CHLORIDE, SODIUM LACTATE, POTASSIUM CHLORIDE, CALCIUM CHLORIDE 600; 310; 30; 20 MG/100ML; MG/100ML; MG/100ML; MG/100ML
125 INJECTION, SOLUTION INTRAVENOUS CONTINUOUS
Status: DISCONTINUED | OUTPATIENT
Start: 2022-08-12 | End: 2022-08-12 | Stop reason: HOSPADM

## 2022-08-12 RX ORDER — KETOROLAC TROMETHAMINE 30 MG/ML
30 INJECTION, SOLUTION INTRAMUSCULAR; INTRAVENOUS ONCE
Status: COMPLETED | OUTPATIENT
Start: 2022-08-12 | End: 2022-08-12

## 2022-08-12 RX ORDER — LIDOCAINE HYDROCHLORIDE 10 MG/ML
INJECTION INFILTRATION; PERINEURAL
Status: DISCONTINUED | OUTPATIENT
Start: 2022-08-12 | End: 2022-08-12 | Stop reason: HOSPADM

## 2022-08-12 RX ORDER — PROPOFOL 10 MG/ML
VIAL (ML) INTRAVENOUS
Status: DISCONTINUED | OUTPATIENT
Start: 2022-08-12 | End: 2022-08-12

## 2022-08-12 RX ORDER — CEFAZOLIN SODIUM 2 G/50ML
2 SOLUTION INTRAVENOUS
Status: COMPLETED | OUTPATIENT
Start: 2022-08-12 | End: 2022-08-12

## 2022-08-12 RX ORDER — DEXAMETHASONE SODIUM PHOSPHATE 4 MG/ML
INJECTION, SOLUTION INTRA-ARTICULAR; INTRALESIONAL; INTRAMUSCULAR; INTRAVENOUS; SOFT TISSUE
Status: DISCONTINUED | OUTPATIENT
Start: 2022-08-12 | End: 2022-08-12

## 2022-08-12 RX ORDER — OXYCODONE AND ACETAMINOPHEN 10; 325 MG/1; MG/1
TABLET ORAL
Status: COMPLETED
Start: 2022-08-12 | End: 2022-08-12

## 2022-08-12 RX ORDER — SODIUM CHLORIDE, SODIUM LACTATE, POTASSIUM CHLORIDE, CALCIUM CHLORIDE 600; 310; 30; 20 MG/100ML; MG/100ML; MG/100ML; MG/100ML
INJECTION, SOLUTION INTRAVENOUS CONTINUOUS
Status: DISCONTINUED | OUTPATIENT
Start: 2022-08-12 | End: 2022-08-12 | Stop reason: HOSPADM

## 2022-08-12 RX ORDER — DIPHENHYDRAMINE HYDROCHLORIDE 50 MG/ML
12.5 INJECTION INTRAMUSCULAR; INTRAVENOUS
Status: DISCONTINUED | OUTPATIENT
Start: 2022-08-12 | End: 2022-08-12 | Stop reason: HOSPADM

## 2022-08-12 RX ORDER — LIDOCAINE HYDROCHLORIDE 10 MG/ML
1 INJECTION, SOLUTION EPIDURAL; INFILTRATION; INTRACAUDAL; PERINEURAL ONCE
Status: COMPLETED | OUTPATIENT
Start: 2022-08-12 | End: 2022-08-12

## 2022-08-12 RX ORDER — OXYCODONE AND ACETAMINOPHEN 10; 325 MG/1; MG/1
1 TABLET ORAL ONCE
Status: COMPLETED | OUTPATIENT
Start: 2022-08-12 | End: 2022-08-12

## 2022-08-12 RX ORDER — ONDANSETRON 2 MG/ML
INJECTION INTRAMUSCULAR; INTRAVENOUS
Status: DISCONTINUED | OUTPATIENT
Start: 2022-08-12 | End: 2022-08-12

## 2022-08-12 RX ORDER — FAMOTIDINE 10 MG/ML
INJECTION INTRAVENOUS
Status: DISCONTINUED
Start: 2022-08-12 | End: 2022-08-12 | Stop reason: HOSPADM

## 2022-08-12 RX ORDER — FAMOTIDINE 10 MG/ML
20 INJECTION INTRAVENOUS ONCE
Status: COMPLETED | OUTPATIENT
Start: 2022-08-12 | End: 2022-08-12

## 2022-08-12 RX ORDER — ONDANSETRON 2 MG/ML
4 INJECTION INTRAMUSCULAR; INTRAVENOUS DAILY PRN
Status: DISCONTINUED | OUTPATIENT
Start: 2022-08-12 | End: 2022-08-12 | Stop reason: HOSPADM

## 2022-08-12 RX ORDER — MIDAZOLAM HYDROCHLORIDE 1 MG/ML
INJECTION, SOLUTION INTRAMUSCULAR; INTRAVENOUS
Status: DISCONTINUED | OUTPATIENT
Start: 2022-08-12 | End: 2022-08-12

## 2022-08-12 RX ORDER — KETOROLAC TROMETHAMINE 30 MG/ML
INJECTION, SOLUTION INTRAMUSCULAR; INTRAVENOUS
Status: COMPLETED
Start: 2022-08-12 | End: 2022-08-12

## 2022-08-12 RX ORDER — MORPHINE SULFATE 4 MG/ML
INJECTION INTRAVENOUS
Status: DISCONTINUED | OUTPATIENT
Start: 2022-08-12 | End: 2022-08-12

## 2022-08-12 RX ADMIN — LIDOCAINE HYDROCHLORIDE 50 MG: 10 INJECTION, SOLUTION EPIDURAL; INFILTRATION; INTRACAUDAL; PERINEURAL at 07:08

## 2022-08-12 RX ADMIN — OXYCODONE AND ACETAMINOPHEN 1 TABLET: 10; 325 TABLET ORAL at 12:08

## 2022-08-12 RX ADMIN — KETOROLAC TROMETHAMINE 30 MG: 30 INJECTION, SOLUTION INTRAMUSCULAR; INTRAVENOUS at 12:08

## 2022-08-12 RX ADMIN — MORPHINE SULFATE 4 MG: 4 INJECTION INTRAVENOUS at 07:08

## 2022-08-12 RX ADMIN — OXYCODONE HYDROCHLORIDE AND ACETAMINOPHEN 1 TABLET: 10; 325 TABLET ORAL at 12:08

## 2022-08-12 RX ADMIN — SODIUM CHLORIDE, SODIUM LACTATE, POTASSIUM CHLORIDE, AND CALCIUM CHLORIDE: .6; .31; .03; .02 INJECTION, SOLUTION INTRAVENOUS at 07:08

## 2022-08-12 RX ADMIN — MIDAZOLAM HYDROCHLORIDE 2 MG: 1 INJECTION, SOLUTION INTRAMUSCULAR; INTRAVENOUS at 07:08

## 2022-08-12 RX ADMIN — DEXAMETHASONE SODIUM PHOSPHATE 4 MG: 4 INJECTION, SOLUTION INTRA-ARTICULAR; INTRALESIONAL; INTRAMUSCULAR; INTRAVENOUS; SOFT TISSUE at 07:08

## 2022-08-12 RX ADMIN — ONDANSETRON HYDROCHLORIDE 4 MG: 2 SOLUTION INTRAMUSCULAR; INTRAVENOUS at 12:08

## 2022-08-12 RX ADMIN — PROPOFOL 200 MG: 10 INJECTION, EMULSION INTRAVENOUS at 07:08

## 2022-08-12 RX ADMIN — ONDANSETRON 4 MG: 2 INJECTION INTRAMUSCULAR; INTRAVENOUS at 07:08

## 2022-08-12 RX ADMIN — FAMOTIDINE 20 MG: 10 INJECTION INTRAVENOUS at 07:08

## 2022-08-12 RX ADMIN — CEFAZOLIN SODIUM 2 G: 2 SOLUTION INTRAVENOUS at 07:08

## 2022-08-12 RX ADMIN — MORPHINE SULFATE 2 MG: 4 INJECTION INTRAVENOUS at 12:08

## 2022-08-12 NOTE — BRIEF OP NOTE
[facility] - Surgery  Brief Operative Note    Surgery Date: 8/12/2022     Surgeon(s) and Role:     * Jah Torres DPM - Primary    Assisting Surgeon: None    Pre-op Diagnosis:  Pain, joint, foot, right [M25.571]  Primary osteoarthritis of right foot [M19.071]  Neuritis [M79.2]    Post-op Diagnosis:  Post-Op Diagnosis Codes:     * Pain, joint, foot, right [M25.571]     * Primary osteoarthritis of right foot [M19.071]     * Neuritis [M79.2]    Procedure(s) (LRB):  FUSION, MTP JOINT paragon 28 plates and screws (Right)    Anesthesia: Choice    Operative Findings:     Estimated Blood Loss: * No values recorded between 8/12/2022  8:01 AM and 8/12/2022 11:21 AM *         Specimens:   Specimen (24h ago, onward)            None            Discharge Note    OUTCOME: Patient tolerated treatment/procedure well without complication and is now ready for discharge.    DISPOSITION: Home or Self Care    FINAL DIAGNOSIS:  Hypermobile joint syndrome of right foot    FOLLOWUP: In clinic    DISCHARGE INSTRUCTIONS:  No discharge procedures on file.     Clinical Reference Documents Added to Patient Instructions       Document    POSTOPERATIVE PAIN DISCHARGE INSTRUCTIONS (ENGLISH)

## 2022-08-12 NOTE — DISCHARGE INSTRUCTIONS

## 2022-08-12 NOTE — OP NOTE
[facility] - Surgery  Operative Note     SUMMARY     Surgery Date: 8/12/2022       Pre-op Diagnosis:  Pain, joint, foot, right [M25.571]  Primary osteoarthritis of right foot [M19.071]  Neuritis [M79.2]    Post-op Diagnosis:  Post-Op Diagnosis Codes:     * Pain, joint, foot, right [M25.571]     * Primary osteoarthritis of right foot [M19.071]     * Neuritis [M79.2]    Procedure  Multiple midfoot fusion with fixation as noted to include the base of the 5th metatarsal cuboid articular surface between the 4th and 5th metatarsal and calcaneal cuboid joint right procedure code 89601  Removal painful orthopedic hardware 2 bone staples bridging the calcaneal cuboid joint right procedure code 20680 x 2    Surgeon(s) and Role:     * Jah Torres DPM - Primary      Estimated Blood Loss:  20 cc  Hemostasis:  Ankle tourniquet placed at 250 mmHg for a period of 2 hours 38 minutes    Anesthesia:  General anesthesia in conjunction with local infiltrate equaling 20 cc of 1% lidocaine plain  Materials sterile irrigant 3.0 Vicryl 4.0 Vicryl 3.0 Monocryl paragon 28 8 mm Lapidus bone wedge paragon 28 right large lateral column plate 95 mm 5.5 screw 3.5 x 16 mm locking screw x2 3.5 x 18 mm locking screw 3.5 x 24 mm locking screw  Injectables 30 cc of 0.5% Marcaine plain  Pathology none  Condition stable  Complications none    Description of the findings of the procedure:  Pain, joint, foot, right [M25.571]  Primary osteoarthritis of right foot [M19.071]  Neuritis [M79.2]         Specimens (From admission, onward)    None           Procedure:  Patient was taken the operating room placed in supine position on the OR table attention was then directed towards the patient's right ankle where Webril cast padding was placed on the patient's right ankle as was an ankle tourniquet.  Patient was then locally anesthetized in a regional block of the lateral portion of the patient's right foot utilizing a total of 20 cc of 1% lidocaine plain.   Following this the patient's foot was prepped and draped in the usual sterile manner patient's foot was then exsanguinated utilizing Esmarch bandage the ankle tourniquet was raised to 250 mmHg.  Intraoperative fluoroscopy was used to plan an incision overlying the lateral aspect of the calcaneal cuboid joint patient had 2 previously placed orthopedic staples in this area 1 of the stables was fractured however both of these bone staples needed to be excised for refixation of the calcaneocuboid joint.  A longitudinal linear incision was created as guided under intraoperative fluoroscopy this was carried down to the level of the bone staples carefully protecting the peroneal tendons and neurovascular structures upon and counter ring both of the staples they were able to be carefully removed the superior staple had the distal leg of the staple that had previously fractured this remained buried in the bone and was not able to be removed however the remaining portion of the staple was removed there was extensive debridement required removing scar tissue overlying the staple and extensive instrumentation utilized to remove the 1st initial staple the 2nd staple also required extensive debridement and dissection to allow for removal of the staple additionally overlying the calcaneal cuboid joint.  The remaining leg of the staple that had fractured we did not affect the surgical outcome or today's surgical repair.  Evaluation of the joint did display fibrosis within the joint and partial fusion however it did not appear completely fused on evaluation.  A sagittal saw was utilized to freshen up the calcaneal cuboid joint a the area was then curetted and a paragon 28 bone graft was trimmed to appropriate size and thickness and placed within this joint for re-fusion.  The area was then thoroughly flushed irrigated with copious amounts of sterile saline deep closure was achieved with 3.0 Vicryl in a continuous running fashion  intermediate closure was achieved with 4.0 Vicryl in a simple interrupted fashion and skin closure was achieved with 3.0 Monocryl in a simple interrupted fashion.  Attention was then redirected overlying the dorsal aspect of the patient's right foot overlying the base of the 4th and 5th metatarsals right.  Upon making the incision careful dissection was utilized protecting neurovascular structures and muscle belly in the area of the incision.  Upon dissection down to the level of the base of the 4th and 5th metatarsal there was noted to be fibrosis between the base of the metatarsals and the 4th metatarsal was causing plantar flexion of the 5th metatarsal base this fibrosis was released and a sagittal saw was used to resect resect the articular surface between the base of the 4th and 5th metatarsal additionally the articular cartilage at the base of the 4th and 5th metatarsal was resected utilizing a sagittal saw as was the articular cartilage on the cuboid resected utilizing a sagittal saw this was removed from the surgical field this allowed the 5th metatarsal to be shifted dorsally where it had been previously plantar flexed and very prominent on the plantar aspect of the patient's right foot there was also extensive hypermobility noted within this region.  Hypermobility was noted encompassing the entire lateral column of the patient's right foot.  Once the articular cartilage had been resected the 5th metatarsal was dorsiflexed at its base thus removing the prominence on the plantar lateral surface of the patient's right foot 2 K-wires were utilized to maintain proper alignment and new positioning at the base of the 5th metatarsal.  A paragon 28 bone graft was prepared according to manufacture guidelines trimmed to appropriate size and with and placed at the base of both the 4th and 5th metatarsal and the articular surface where it had been previously resected of the cuboid to allow for acute fusion of these  joints as well as fusion between the base of the 4th and 5th metatarsal.  Once proper alignment again was confirmed as utilized under intraoperative fluoroscopy a paragon 28 lateral column fusion plate was utilized to fuse all of these joints in a properly aligned position this plate was placed per manufacture guidelines including a 95 mm 5.5 compression screw that not only  fused these sites but also to compress these joints but also fuse the calcaneal cuboid joint this was properly placed as confirmed under intraoperative fluoroscopy multiple locking screws were used within the lateral column fusion plate patient was now noted to have a stable lateral column previously noted hypermobility had been addressed as has the plantar flexed base of the 5th metatarsal which was now dorsiflexed in a properly aligned position thus removing the prominent area on the plantar lateral aspect of the patient's right foot.  The base of the 5th metatarsal was a dorsiflexed approximately 1 cm. The area was thoroughly flushed irrigated with copious amounts of sterile saline 3.0 Vicryl was used in a continuous running fashion to close the deep layers of the surgical site 4.0 Vicryl was used in a simple interrupted fashion to close intermediate layers of the surgical site and 3.0 Monocryl was used in a simple interrupted fashion to close the skin layers of the surgical site intraoperative fluoroscopy was used throughout the entire procedure to ensure proper alignment reduction of deformities and placement of hardware patient was then injected with additional 30 cc of 0.5% Marcaine plain to create a long-term postoperative anesthetic.  Ankle tourniquet had previously been lowered after 2 hours and 38 minutes during the procedure patient's bleeding was well controlled no significant bleeding noted upon primary closure of the incision sites.  Adaptic nonadhesive dressing sterile 4x4s multiple ABDs multiple rolls of Kerlix were used to  dress the area patient was then placed in a lower leg fiberglass cast we will maintain complete nonweightbearing status for the next 4 weeks.  Patient left the operating room vital signs stable and vascular status having return to the patient's preoperative levels.This note was created using [employer] voice recognition software that occasionally misinterpreted phrases or words.

## 2022-08-12 NOTE — TRANSFER OF CARE
Anesthesia Transfer of Care Note    Patient: Sg Sesay    Procedure(s) Performed: Procedure(s) (LRB):  FUSION, MTP JOINT paragon 28 plates and screws (Right)    Patient location: PACU    Anesthesia Type: general    Transport from OR: Transported from OR on room air with adequate spontaneous ventilation    Post pain: adequate analgesia    Post assessment: no apparent anesthetic complications and tolerated procedure well    Post vital signs: stable    Level of consciousness: awake, alert and oriented    Nausea/Vomiting: no nausea/vomiting    Complications: none    Transfer of care protocol was followed      Last vitals:   Visit Vitals  /88 (BP Location: Right arm, Patient Position: Lying)   Pulse 86   Temp 36.7 °C (98.1 °F) (Oral)   Resp 18   Ht 6' (1.829 m)   Wt 79.4 kg (175 lb)   SpO2 97%   BMI 23.73 kg/m²

## 2022-08-12 NOTE — ANESTHESIA PREPROCEDURE EVALUATION
08/12/2022  Sg Sesay is a 46 y.o., male.      Pre-op Assessment    I have reviewed the Patient Summary Reports.     I have reviewed the Nursing Notes. I have reviewed the NPO Status.   I have reviewed the Medications.     Review of Systems  Social:  Non-Smoker    Hematology/Oncology:  Hematology Normal   Oncology Normal     EENT/Dental:EENT/Dental Normal   Cardiovascular:  Cardiovascular Normal     Pulmonary:  Pulmonary Normal    Renal/:  Renal/ Normal     Hepatic/GI:  Hepatic/GI Normal    Musculoskeletal:   Arthritis   Muscle Disorders: Chronic pain/tendonitis/plantar fascitis Spine Disorders: lumbar Chronic Pain    Neurological:   Neuromuscular Disease, (MS)   Chronic Pain Syndrome   Dermatological:  Skin Normal    Psych:  Psychiatric Normal           Physical Exam    Airway:  Mallampati: II   Mouth Opening: Normal  TM Distance: Normal  Tongue: Normal  Neck ROM: Normal ROM    Chest/Lungs:  Clear to auscultation    Heart:  Rate: Normal        Anesthesia Plan  Type of Anesthesia, risks & benefits discussed:    Anesthesia Type: Gen Supraglottic Airway  Intra-op Monitoring Plan: Standard ASA Monitors  Post Op Pain Control Plan: multimodal analgesia  Induction:  IV  Informed Consent: Informed consent signed with the Patient and all parties understand the risks and agree with anesthesia plan.  All questions answered.   ASA Score: 3  Day of Surgery Review of History & Physical: H&P Update referred to the surgeon/provider.    Ready For Surgery From Anesthesia Perspective.     .

## 2022-08-12 NOTE — ANESTHESIA PROCEDURE NOTES
Intubation    Date/Time: 8/12/2022 7:36 AM  Performed by: Dale Magaña CRNA  Authorized by: Kyle Lozano MD     Intubation:     Induction:  Intravenous    Intubated:  Postinduction    Mask Ventilation:  Easy mask    Attempts:  1    Attempted By:  CRNA    Difficult Airway Encountered?: No      Complications:  None    Airway Device:  Supraglottic airway/LMA    Airway Device Size:  3.5    Secured at:  The lips    Placement Verified By:  Capnometry    Complicating Factors:  None    Findings Post-Intubation:  BS equal bilateral

## 2022-08-12 NOTE — PLAN OF CARE
Has met unit/department guidelines for discharge from each phase of the post procedure continuum. Leaving floor per w/c with RN. SAMIRA x3. Resp even and unlabored room air. No distress noted. Tolerating Po fluids without c/o nausea/vomiting. Reports voiding without difficulty. Ice packs x2 refilled and sent home with pt. All personal belongings returned to pt.

## 2022-08-12 NOTE — ANESTHESIA POSTPROCEDURE EVALUATION
Anesthesia Post Evaluation    Patient: Sg Sesay    Procedure(s) Performed: Procedure(s) (LRB):  FUSION, MTP JOINT paragon 28 plates and screws (Right)    Final Anesthesia Type: general      Patient location during evaluation: PACU  Patient participation: Yes- Able to Participate  Level of consciousness: awake and alert  Post-procedure vital signs: reviewed and stable  Pain management: adequate  Airway patency: patent    PONV status at discharge: No PONV  Anesthetic complications: no      Cardiovascular status: blood pressure returned to baseline  Respiratory status: unassisted  Hydration status: euvolemic  Follow-up not needed.          Vitals Value Taken Time   /97 08/12/22 1302   Temp 36.5 °C (97.7 °F) 08/12/22 1123   Pulse 80 08/12/22 1330   Resp 10 08/12/22 1330   SpO2 96 % 08/12/22 1329   Vitals shown include unvalidated device data.      Event Time   Out of Recovery 11:54:00         Pain/Mariano Score: Pain Rating Prior to Med Admin: 6 (8/12/2022 12:55 PM)  Pain Rating Post Med Admin: 2 (8/12/2022  1:25 PM)  Mariano Score: 10 (8/12/2022 11:53 AM)  Modified Mariano Score: 19 (8/12/2022  1:43 PM)

## 2022-08-15 ENCOUNTER — OFFICE VISIT (OUTPATIENT)
Dept: PODIATRY | Facility: CLINIC | Age: 47
End: 2022-08-15
Payer: MEDICARE

## 2022-08-15 VITALS
DIASTOLIC BLOOD PRESSURE: 81 MMHG | WEIGHT: 175 LBS | SYSTOLIC BLOOD PRESSURE: 136 MMHG | HEART RATE: 81 BPM | BODY MASS INDEX: 23.7 KG/M2 | HEIGHT: 72 IN | TEMPERATURE: 98 F

## 2022-08-15 DIAGNOSIS — M35.7 HYPERMOBILE JOINT SYNDROME OF RIGHT FOOT: ICD-10-CM

## 2022-08-15 DIAGNOSIS — T84.84XA PAINFUL ORTHOPAEDIC HARDWARE: ICD-10-CM

## 2022-08-15 DIAGNOSIS — S93.311A CUBOID SYNDROME OF RIGHT FOOT: Primary | ICD-10-CM

## 2022-08-15 PROCEDURE — 99024 POSTOP FOLLOW-UP VISIT: CPT | Mod: POP,,, | Performed by: PODIATRIST

## 2022-08-15 PROCEDURE — 99999 PR PBB SHADOW E&M-EST. PATIENT-LVL IV: ICD-10-PCS | Mod: PBBFAC,,, | Performed by: PODIATRIST

## 2022-08-15 PROCEDURE — 99024 PR POST-OP FOLLOW-UP VISIT: ICD-10-PCS | Mod: POP,,, | Performed by: PODIATRIST

## 2022-08-15 PROCEDURE — 99999 PR PBB SHADOW E&M-EST. PATIENT-LVL IV: CPT | Mod: PBBFAC,,, | Performed by: PODIATRIST

## 2022-08-15 PROCEDURE — 99214 OFFICE O/P EST MOD 30 MIN: CPT | Mod: PBBFAC | Performed by: PODIATRIST

## 2022-08-16 NOTE — PROGRESS NOTES
Sg Sesay is a 46 y.o. male patient.   1. Cuboid syndrome of right foot    2. Painful orthopaedic hardware    3. Hypermobile joint syndrome of right foot      Past Medical History:   Diagnosis Date    Back pain     MS (multiple sclerosis)      No past surgical history pertinent negatives on file.  Scheduled Meds:  Continuous Infusions:  PRN Meds:    Review of patient's allergies indicates:   Allergen Reactions    Iodine and iodide containing products Anaphylaxis    Iodine     Sildenafil     Shellfish containing products Nausea And Vomiting     Other reaction(s): NAUSEA,VOMITING     There are no hospital problems to display for this patient.    Blood pressure 136/81, pulse 81, temperature 98.1 °F (36.7 °C), height 6' (1.829 m), weight 79.4 kg (175 lb).            Subjective  Objective   Assessment & Plan     Patient presents status post excision orthopedic hardware and stabilization with fusion lateral column right.  Patient states he is doing well he was having arm pain following surgery he states this has gotten significantly better he is tolerating the Bactrim well patient is currently afebrile and in no acute distress.  Patient is doing very well his cast is fitting well both distally and proximally he is having some degree of throbbing but states it is manageable.  Plan follow-up will be 2 weeks for cast change in x-rays patient has been advised to contact us with any problems questions or concerns prior to that time.This note was created using [employer] voice recognition software that occasionally misinterpreted phrases or words.  Jah Torres DPM  8/16/2022

## 2022-08-19 ENCOUNTER — TELEPHONE (OUTPATIENT)
Dept: PODIATRY | Facility: CLINIC | Age: 47
End: 2022-08-19
Payer: MEDICARE

## 2022-08-19 RX ORDER — OXYCODONE AND ACETAMINOPHEN 10; 325 MG/1; MG/1
1 TABLET ORAL 3 TIMES DAILY
Qty: 90 TABLET | Refills: 0 | Status: SHIPPED | OUTPATIENT
Start: 2022-08-19 | End: 2022-09-18

## 2022-08-19 NOTE — TELEPHONE ENCOUNTER
----- Message from Alvaro De Oliveira sent at 8/19/2022 11:20 AM CDT -----  Who Called:        Refill or New Rx: refill         RX Name and Strength:   Oxycodone          Is this a 30 day or 90 day RX        Preferred Pharmacy with phone number:  [id number], [place] [address] [address]        Local or Mail Order: local           Would the patient rather a call back or a response via [facility]? Call back         Best Call Back Number:  [phone]        Additional Information:

## 2022-08-29 ENCOUNTER — HOSPITAL ENCOUNTER (OUTPATIENT)
Dept: RADIOLOGY | Facility: HOSPITAL | Age: 47
Discharge: HOME | End: 2022-08-29
Attending: PODIATRIST
Payer: MEDICARE

## 2022-08-29 ENCOUNTER — OFFICE VISIT (OUTPATIENT)
Dept: PODIATRY | Facility: CLINIC | Age: 47
End: 2022-08-29
Payer: MEDICARE

## 2022-08-29 VITALS
HEIGHT: 72 IN | SYSTOLIC BLOOD PRESSURE: 133 MMHG | HEART RATE: 88 BPM | TEMPERATURE: 98 F | DIASTOLIC BLOOD PRESSURE: 78 MMHG | BODY MASS INDEX: 23.7 KG/M2 | WEIGHT: 175 LBS

## 2022-08-29 DIAGNOSIS — S93.311A CUBOID SYNDROME OF RIGHT FOOT: ICD-10-CM

## 2022-08-29 DIAGNOSIS — S93.311A CUBOID SYNDROME OF RIGHT FOOT: Primary | ICD-10-CM

## 2022-08-29 PROCEDURE — 73630 X-RAY EXAM OF FOOT: CPT | Mod: TC,RT

## 2022-08-29 PROCEDURE — 99999 PR PBB SHADOW E&M-EST. PATIENT-LVL IV: CPT | Mod: PBBFAC,,, | Performed by: PODIATRIST

## 2022-08-29 PROCEDURE — 29405 APPL SHORT LEG CAST: CPT | Mod: S$PBB,58,RT, | Performed by: PODIATRIST

## 2022-08-29 PROCEDURE — 29405 PR APPLY SHORT LEG CAST: ICD-10-PCS | Mod: S$PBB,58,RT, | Performed by: PODIATRIST

## 2022-08-29 PROCEDURE — 29405 APPL SHORT LEG CAST: CPT | Mod: PBBFAC,RT | Performed by: PODIATRIST

## 2022-08-29 PROCEDURE — 99024 POSTOP FOLLOW-UP VISIT: CPT | Mod: POP,,, | Performed by: PODIATRIST

## 2022-08-29 PROCEDURE — 99999 PR PBB SHADOW E&M-EST. PATIENT-LVL IV: ICD-10-PCS | Mod: PBBFAC,,, | Performed by: PODIATRIST

## 2022-08-29 PROCEDURE — 73630 XR FOOT COMPLETE 3 VIEW RIGHT: ICD-10-PCS | Mod: 26,RT,, | Performed by: RADIOLOGY

## 2022-08-29 PROCEDURE — 99214 OFFICE O/P EST MOD 30 MIN: CPT | Mod: PBBFAC,25 | Performed by: PODIATRIST

## 2022-08-29 PROCEDURE — 73630 X-RAY EXAM OF FOOT: CPT | Mod: 26,RT,, | Performed by: RADIOLOGY

## 2022-08-29 PROCEDURE — 99024 PR POST-OP FOLLOW-UP VISIT: ICD-10-PCS | Mod: POP,,, | Performed by: PODIATRIST

## 2022-08-31 ENCOUNTER — CLINICAL SUPPORT (OUTPATIENT)
Dept: PODIATRY | Facility: CLINIC | Age: 47
End: 2022-08-31
Payer: MEDICARE

## 2022-08-31 ENCOUNTER — TELEPHONE (OUTPATIENT)
Dept: PODIATRY | Facility: CLINIC | Age: 47
End: 2022-08-31
Payer: MEDICARE

## 2022-08-31 DIAGNOSIS — S93.311A CUBOID SYNDROME OF RIGHT FOOT: Primary | ICD-10-CM

## 2022-08-31 PROCEDURE — 99999 PR PBB SHADOW E&M-EST. PATIENT-LVL III: CPT | Mod: PBBFAC,,, | Performed by: PODIATRIST

## 2022-08-31 PROCEDURE — 29405 PR APPLY SHORT LEG CAST: ICD-10-PCS | Mod: S$PBB,58,RT, | Performed by: PODIATRIST

## 2022-08-31 PROCEDURE — 29405 APPL SHORT LEG CAST: CPT | Mod: S$PBB,58,RT, | Performed by: PODIATRIST

## 2022-08-31 PROCEDURE — 29405 APPL SHORT LEG CAST: CPT | Mod: PBBFAC,RT | Performed by: PODIATRIST

## 2022-08-31 PROCEDURE — 99213 OFFICE O/P EST LOW 20 MIN: CPT | Mod: PBBFAC | Performed by: PODIATRIST

## 2022-08-31 PROCEDURE — 99024 POSTOP FOLLOW-UP VISIT: CPT | Mod: POP,,, | Performed by: PODIATRIST

## 2022-08-31 PROCEDURE — 99024 PR POST-OP FOLLOW-UP VISIT: ICD-10-PCS | Mod: POP,,, | Performed by: PODIATRIST

## 2022-08-31 PROCEDURE — 99999 PR PBB SHADOW E&M-EST. PATIENT-LVL III: ICD-10-PCS | Mod: PBBFAC,,, | Performed by: PODIATRIST

## 2022-08-31 RX ORDER — TRAZODONE HYDROCHLORIDE 100 MG/1
200 TABLET ORAL NIGHTLY PRN
COMMUNITY
Start: 2022-08-30 | End: 2024-02-13

## 2022-08-31 NOTE — PROGRESS NOTES
Patient was seen under my direct supervision for changing of the cast on the right lower extremity due to the patient's cast loosening and him having too much mobility inside the cast a new lower leg fiberglass cast was applied.

## 2022-08-31 NOTE — PROGRESS NOTES
Sg Sesay is a 46 y.o. male patient.   1. Cuboid syndrome of right foot      Past Medical History:   Diagnosis Date    Back pain     MS (multiple sclerosis)      No past surgical history pertinent negatives on file.  Scheduled Meds:  Continuous Infusions:  PRN Meds:    Review of patient's allergies indicates:   Allergen Reactions    Iodine and iodide containing products Anaphylaxis    Iodine     Sildenafil     Shellfish containing products Nausea And Vomiting     Other reaction(s): NAUSEA,VOMITING     There are no hospital problems to display for this patient.    Blood pressure 133/78, pulse 88, temperature 97.9 °F (36.6 °C), height 6' (1.829 m), weight 79.4 kg (175 lb).                              Subjective  Objective:  Vital signs (most recent): Blood pressure 133/78, pulse 88, temperature 97.9 °F (36.6 °C), height 6' (1.829 m), weight 79.4 kg (175 lb).   Assessment & Plan     Patient presents status post excision orthopedic hardware and stabilization with fusion lateral column right.  Patient states he is doing very well he is not having pain or discomfort and states he is having a lot less discomfort that he had prior to surgery even though the patient is currently nonweightbearing.  On evaluation the patient's incisions look very good he does have inflammation consistent with his current postoperative status.  Patient states he is very happy with his surgical outcome and the fact that he is having a lot less discomfort than it previously had.  New not lower leg fiberglass cast was applied to the right lower extremity patient will be seen for follow-up in 2 weeks at which time I plan to transition the patient to a fracture boot he should be able to start getting the area wet at that time.  X-rays reviewed everything appears to be healing well at this time patient has good anatomic alignment and surgical correction right foot.  Patient advised to contact us with any problems questions or concerns prior  to scheduled follow-up.  This note was created using [employer] voice recognition software that occasionally misinterpreted phrases or words.  Jah Torres DPM  8/31/2022

## 2022-09-09 ENCOUNTER — TELEPHONE (OUTPATIENT)
Dept: PODIATRY | Facility: CLINIC | Age: 47
End: 2022-09-09
Payer: MEDICARE

## 2022-09-12 ENCOUNTER — OFFICE VISIT (OUTPATIENT)
Dept: PODIATRY | Facility: CLINIC | Age: 47
End: 2022-09-12
Payer: MEDICARE

## 2022-09-12 ENCOUNTER — HOSPITAL ENCOUNTER (OUTPATIENT)
Dept: RADIOLOGY | Facility: HOSPITAL | Age: 47
Discharge: HOME | End: 2022-09-12
Attending: PODIATRIST
Payer: MEDICARE

## 2022-09-12 VITALS
WEIGHT: 175 LBS | BODY MASS INDEX: 23.7 KG/M2 | HEART RATE: 70 BPM | SYSTOLIC BLOOD PRESSURE: 135 MMHG | DIASTOLIC BLOOD PRESSURE: 83 MMHG | RESPIRATION RATE: 16 BRPM | HEIGHT: 72 IN

## 2022-09-12 DIAGNOSIS — S93.311A CUBOID SYNDROME OF RIGHT FOOT: ICD-10-CM

## 2022-09-12 DIAGNOSIS — S93.311A CUBOID SYNDROME OF RIGHT FOOT: Primary | ICD-10-CM

## 2022-09-12 PROCEDURE — 99999 PR PBB SHADOW E&M-EST. PATIENT-LVL V: CPT | Mod: PBBFAC,,, | Performed by: PODIATRIST

## 2022-09-12 PROCEDURE — 99215 OFFICE O/P EST HI 40 MIN: CPT | Mod: PBBFAC | Performed by: PODIATRIST

## 2022-09-12 PROCEDURE — 73630 X-RAY EXAM OF FOOT: CPT | Mod: 26,RT,, | Performed by: RADIOLOGY

## 2022-09-12 PROCEDURE — 99024 POSTOP FOLLOW-UP VISIT: CPT | Mod: POP,,, | Performed by: PODIATRIST

## 2022-09-12 PROCEDURE — 99999 PR PBB SHADOW E&M-EST. PATIENT-LVL V: ICD-10-PCS | Mod: PBBFAC,,, | Performed by: PODIATRIST

## 2022-09-12 PROCEDURE — 73630 XR FOOT COMPLETE 3 VIEW RIGHT: ICD-10-PCS | Mod: 26,RT,, | Performed by: RADIOLOGY

## 2022-09-12 PROCEDURE — 99024 PR POST-OP FOLLOW-UP VISIT: ICD-10-PCS | Mod: POP,,, | Performed by: PODIATRIST

## 2022-09-12 PROCEDURE — 73630 X-RAY EXAM OF FOOT: CPT | Mod: TC,RT

## 2022-09-12 RX ORDER — TERIFLUNOMIDE 14 MG/1
14 TABLET, FILM COATED ORAL
COMMUNITY
Start: 2022-08-30 | End: 2022-09-13 | Stop reason: SDUPTHER

## 2022-09-12 RX ORDER — PREGABALIN 200 MG/1
200 CAPSULE ORAL 3 TIMES DAILY
COMMUNITY
Start: 2022-08-30 | End: 2022-09-27 | Stop reason: SDUPTHER

## 2022-09-12 RX ORDER — PREGABALIN 200 MG/1
200 CAPSULE ORAL
COMMUNITY
Start: 2022-08-30 | End: 2022-09-13 | Stop reason: SDUPTHER

## 2022-09-13 NOTE — PROGRESS NOTES
Sg Sesay is a 46 y.o. male patient.   1. Cuboid syndrome of right foot      Past Medical History:   Diagnosis Date    Back pain     MS (multiple sclerosis)      No past surgical history pertinent negatives on file.  Scheduled Meds:  Continuous Infusions:  PRN Meds:    Review of patient's allergies indicates:   Allergen Reactions    Iodine and iodide containing products Anaphylaxis    Iodine     Sildenafil     Shellfish containing products Nausea And Vomiting     Other reaction(s): NAUSEA,VOMITING     There are no hospital problems to display for this patient.    Blood pressure 135/83, pulse 70, resp. rate 16, height 6' (1.829 m), weight 79.4 kg (175 lb).                                                        Subjective  Objective:  Vital signs (most recent): Blood pressure 135/83, pulse 70, resp. rate 16, height 6' (1.829 m), weight 79.4 kg (175 lb).   Assessment & Plan     Patient presents status post excision orthopedic hardware and stabilization with fusion lateral column right.  Patient states he is doing well he is not having significant pain or discomfort he can start getting the areas wet but must gently clean the surgical sites with Dakin solution paint them with Betadine and apply a light dressing to them afterwards I will allow the patient to start to bear weight with the fracture boot as tolerated he is going to put a very mild arch support in the fracture boot and he is only to bear weight as tolerated he understands he is going to have swelling he is going to have discomfort that is part of the recovery process he will not be applying any cream lotion or ointment to the incisions.  X-rays reviewed sites of fusion are healing well with notable fixation present in proper alignment.  Plan follow-up 2 weeks patient advised to contact us if he has any problems questions or concerns prior to the scheduled follow-up.  This note was created using [employer] voice recognition software that occasionally  misinterpreted phrases or words.  Jah Torres DPM  9/13/2022

## 2022-09-26 ENCOUNTER — HOSPITAL ENCOUNTER (OUTPATIENT)
Dept: RADIOLOGY | Facility: HOSPITAL | Age: 47
Discharge: HOME | End: 2022-09-26
Attending: PODIATRIST
Payer: MEDICARE

## 2022-09-26 ENCOUNTER — OFFICE VISIT (OUTPATIENT)
Dept: PODIATRY | Facility: CLINIC | Age: 47
End: 2022-09-26
Payer: MEDICARE

## 2022-09-26 VITALS
TEMPERATURE: 98 F | HEIGHT: 72 IN | WEIGHT: 175 LBS | SYSTOLIC BLOOD PRESSURE: 126 MMHG | BODY MASS INDEX: 23.7 KG/M2 | HEART RATE: 89 BPM | DIASTOLIC BLOOD PRESSURE: 77 MMHG

## 2022-09-26 DIAGNOSIS — S93.311A CUBOID SYNDROME OF RIGHT FOOT: ICD-10-CM

## 2022-09-26 DIAGNOSIS — S93.311A CUBOID SYNDROME OF RIGHT FOOT: Primary | ICD-10-CM

## 2022-09-26 PROCEDURE — 73630 X-RAY EXAM OF FOOT: CPT | Mod: TC,RT

## 2022-09-26 PROCEDURE — 99999 PR PBB SHADOW E&M-EST. PATIENT-LVL IV: ICD-10-PCS | Mod: PBBFAC,,, | Performed by: PODIATRIST

## 2022-09-26 PROCEDURE — 73630 X-RAY EXAM OF FOOT: CPT | Mod: 26,RT,, | Performed by: RADIOLOGY

## 2022-09-26 PROCEDURE — 73630 XR FOOT COMPLETE 3 VIEW RIGHT: ICD-10-PCS | Mod: 26,RT,, | Performed by: RADIOLOGY

## 2022-09-26 PROCEDURE — 99999 PR PBB SHADOW E&M-EST. PATIENT-LVL IV: CPT | Mod: PBBFAC,,, | Performed by: PODIATRIST

## 2022-09-26 PROCEDURE — 99024 PR POST-OP FOLLOW-UP VISIT: ICD-10-PCS | Mod: POP,,, | Performed by: PODIATRIST

## 2022-09-26 PROCEDURE — 99214 OFFICE O/P EST MOD 30 MIN: CPT | Mod: PBBFAC | Performed by: PODIATRIST

## 2022-09-26 PROCEDURE — 99024 POSTOP FOLLOW-UP VISIT: CPT | Mod: POP,,, | Performed by: PODIATRIST

## 2022-09-26 RX ORDER — BUSPIRONE HYDROCHLORIDE 15 MG/1
15 TABLET ORAL
COMMUNITY
Start: 2021-10-13 | End: 2023-06-28

## 2022-09-26 RX ORDER — SOLRIAMFETOL 75 MG/1
1 TABLET, FILM COATED ORAL EVERY MORNING
COMMUNITY
Start: 2022-09-26 | End: 2023-06-28

## 2022-09-27 ENCOUNTER — TELEPHONE (OUTPATIENT)
Dept: PODIATRY | Facility: CLINIC | Age: 47
End: 2022-09-27
Payer: MEDICARE

## 2022-09-27 RX ORDER — PREGABALIN 200 MG/1
200 CAPSULE ORAL 3 TIMES DAILY
Qty: 90 CAPSULE | Refills: 5 | Status: SHIPPED | OUTPATIENT
Start: 2022-09-27 | End: 2023-03-16 | Stop reason: SDUPTHER

## 2022-09-27 RX ORDER — OXYCODONE AND ACETAMINOPHEN 10; 325 MG/1; MG/1
1 TABLET ORAL 3 TIMES DAILY
Qty: 90 TABLET | Refills: 0 | Status: SHIPPED | OUTPATIENT
Start: 2022-09-27 | End: 2022-10-26 | Stop reason: SDUPTHER

## 2022-10-01 NOTE — PROGRESS NOTES
Sg Sesay is a 46 y.o. male patient.   1. Cuboid syndrome of right foot      Past Medical History:   Diagnosis Date    Back pain     MS (multiple sclerosis)      No past surgical history pertinent negatives on file.  Scheduled Meds:  Continuous Infusions:  PRN Meds:    Review of patient's allergies indicates:   Allergen Reactions    Iodine and iodide containing products Anaphylaxis    Iodine     Sildenafil     Shellfish containing products Nausea And Vomiting     Other reaction(s): NAUSEA,VOMITING     There are no hospital problems to display for this patient.    Blood pressure 126/77, pulse 89, temperature 98 °F (36.7 °C), height 6' (1.829 m), weight 79.4 kg (175 lb).                                                                  Subjective  Objective:  Vital signs (most recent): Blood pressure 126/77, pulse 89, temperature 98 °F (36.7 °C), height 6' (1.829 m), weight 79.4 kg (175 lb).   Assessment & Plan     Patient presents status post excision orthopedic hardware and stabilization with fusion lateral column right.  Patient states he is doing well he is having quite a bit of pain he still using crutches with the fracture boot I advised him I expect him to have pain and a lot of swelling with the type of procedure that he had performed he is going to have swelling and discomfort for extended period of time but this will improved considerably.  Patient is going to remain in the fracture boot at this time the incisions look much better I am still going to have him clean the incisions with Dakin solution solution applying Betadine and just a light bandage he has been getting the area wet which is fine there is no signs of infection no drainage noted patient is not ready for physical therapy yet I will re-evaluate him in 3 weeks at that time I will likely allow him to transition out of the boot into a comfortable fitting shoe.  This note was created using [employer] voice recognition software that occasionally  misinterpreted phrases or words.  Jah Torres DPM  10/1/2022

## 2022-10-19 ENCOUNTER — OFFICE VISIT (OUTPATIENT)
Dept: PODIATRY | Facility: CLINIC | Age: 47
End: 2022-10-19
Payer: MEDICARE

## 2022-10-19 ENCOUNTER — HOSPITAL ENCOUNTER (OUTPATIENT)
Dept: RADIOLOGY | Facility: HOSPITAL | Age: 47
Discharge: HOME | End: 2022-10-19
Attending: PODIATRIST
Payer: MEDICARE

## 2022-10-19 VITALS
HEART RATE: 91 BPM | HEIGHT: 72 IN | DIASTOLIC BLOOD PRESSURE: 96 MMHG | TEMPERATURE: 98 F | BODY MASS INDEX: 23.7 KG/M2 | SYSTOLIC BLOOD PRESSURE: 151 MMHG | WEIGHT: 175 LBS

## 2022-10-19 DIAGNOSIS — S93.311A CUBOID SYNDROME OF RIGHT FOOT: ICD-10-CM

## 2022-10-19 DIAGNOSIS — S93.311A CUBOID SYNDROME OF RIGHT FOOT: Primary | ICD-10-CM

## 2022-10-19 PROCEDURE — 99024 PR POST-OP FOLLOW-UP VISIT: ICD-10-PCS | Mod: POP,,, | Performed by: PODIATRIST

## 2022-10-19 PROCEDURE — 73630 X-RAY EXAM OF FOOT: CPT | Mod: 26,RT,, | Performed by: RADIOLOGY

## 2022-10-19 PROCEDURE — 99999 PR PBB SHADOW E&M-EST. PATIENT-LVL IV: CPT | Mod: PBBFAC,,, | Performed by: PODIATRIST

## 2022-10-19 PROCEDURE — 73630 X-RAY EXAM OF FOOT: CPT | Mod: TC,RT

## 2022-10-19 PROCEDURE — 99024 POSTOP FOLLOW-UP VISIT: CPT | Mod: POP,,, | Performed by: PODIATRIST

## 2022-10-19 PROCEDURE — 73630 XR FOOT COMPLETE 3 VIEW RIGHT: ICD-10-PCS | Mod: 26,RT,, | Performed by: RADIOLOGY

## 2022-10-19 PROCEDURE — 99214 OFFICE O/P EST MOD 30 MIN: CPT | Mod: PBBFAC | Performed by: PODIATRIST

## 2022-10-19 PROCEDURE — 99999 PR PBB SHADOW E&M-EST. PATIENT-LVL IV: ICD-10-PCS | Mod: PBBFAC,,, | Performed by: PODIATRIST

## 2022-10-19 RX ORDER — DICLOFENAC SODIUM 75 MG/1
75 TABLET, DELAYED RELEASE ORAL 2 TIMES DAILY
Qty: 60 TABLET | Refills: 2 | Status: SHIPPED | OUTPATIENT
Start: 2022-10-19 | End: 2022-12-27

## 2022-10-23 NOTE — PROGRESS NOTES
Sg Sesay is a 46 y.o. male patient.   1. Cuboid syndrome of right foot      Past Medical History:   Diagnosis Date    Back pain     MS (multiple sclerosis)      No past surgical history pertinent negatives on file.  Scheduled Meds:  Continuous Infusions:  PRN Meds:    Review of patient's allergies indicates:   Allergen Reactions    Iodine and iodide containing products Anaphylaxis    Iodine     Sildenafil     Shellfish containing products Nausea And Vomiting     Other reaction(s): NAUSEA,VOMITING     There are no hospital problems to display for this patient.    Blood pressure (!) 151/96, pulse 91, temperature 98 °F (36.7 °C), height 6' (1.829 m), weight 79.4 kg (175 lb).                                                                            Subjective  Objective:  Vital signs (most recent): Blood pressure (!) 151/96, pulse 91, temperature 98 °F (36.7 °C), height 6' (1.829 m), weight 79.4 kg (175 lb).   Assessment & Plan     Patient presents status post excision orthopedic hardware and stabilization with fusion lateral column right.  Patient states he is doing great he states overall he feels a lot better he really feels as if he is turned the corner and having a lot less discomfort he states this is the best his foot has felt in quite a few years I am going to allow the patient to transition from a fracture boot to a shoe as tolerated knows he is going to have inflammation discomfort he may have to ice and elevate the patient had previously run out of diclofenac I have refilled his diclofenac which is going to be very important that he takes as directed.  All of the previously noted incision sites are completely healed x-rays reviewed today with the patient the large stabilizing screw down the lateral column has fractured however the patient is doing great he is not having any pain or discomfort and the fusion sites all appear well healed I advised the patient the fractured screw does not appear to be  an issue and is certainly not anything that needs to be addressed.  Patient is very happy with his progress.  Patient needs to make sure he does not overdo it or do too much anything that could cause him any setbacks.  I did advised the patient the fractured screw does raise concern for a week point in the screw as the screw fractured where the threads meet the smooth portion of the screw and this is indeed a cannulated screw.  I plan to follow up with the patient in 3 weeks states he does plan to go on a trip to [place] next month I will see the patient prior to his trip.  This note was created using [employer] voice recognition software that occasionally misinterpreted phrases or words.  Jah Torres DPM  10/22/2022

## 2022-10-26 ENCOUNTER — TELEPHONE (OUTPATIENT)
Dept: PODIATRY | Facility: CLINIC | Age: 47
End: 2022-10-26
Payer: MEDICARE

## 2022-10-26 RX ORDER — OXYCODONE AND ACETAMINOPHEN 10; 325 MG/1; MG/1
1 TABLET ORAL 3 TIMES DAILY
Qty: 90 TABLET | Refills: 0 | Status: SHIPPED | OUTPATIENT
Start: 2022-10-26 | End: 2022-11-21 | Stop reason: SDUPTHER

## 2022-11-09 ENCOUNTER — HOSPITAL ENCOUNTER (OUTPATIENT)
Dept: RADIOLOGY | Facility: HOSPITAL | Age: 47
Discharge: HOME | End: 2022-11-09
Attending: PODIATRIST
Payer: MEDICARE

## 2022-11-09 ENCOUNTER — OFFICE VISIT (OUTPATIENT)
Dept: PODIATRY | Facility: CLINIC | Age: 47
End: 2022-11-09
Payer: MEDICARE

## 2022-11-09 VITALS
BODY MASS INDEX: 23.7 KG/M2 | TEMPERATURE: 98 F | HEART RATE: 78 BPM | HEIGHT: 72 IN | DIASTOLIC BLOOD PRESSURE: 86 MMHG | WEIGHT: 175 LBS | SYSTOLIC BLOOD PRESSURE: 131 MMHG

## 2022-11-09 DIAGNOSIS — S93.311A CUBOID SYNDROME OF RIGHT FOOT: ICD-10-CM

## 2022-11-09 DIAGNOSIS — S93.311A CUBOID SYNDROME OF RIGHT FOOT: Primary | ICD-10-CM

## 2022-11-09 DIAGNOSIS — M79.671 FOOT PAIN, RIGHT: ICD-10-CM

## 2022-11-09 PROCEDURE — 99024 POSTOP FOLLOW-UP VISIT: CPT | Mod: POP,,, | Performed by: PODIATRIST

## 2022-11-09 PROCEDURE — 99999 PR PBB SHADOW E&M-EST. PATIENT-LVL IV: ICD-10-PCS | Mod: PBBFAC,,, | Performed by: PODIATRIST

## 2022-11-09 PROCEDURE — 73630 X-RAY EXAM OF FOOT: CPT | Mod: TC,RT

## 2022-11-09 PROCEDURE — 99214 OFFICE O/P EST MOD 30 MIN: CPT | Mod: PBBFAC | Performed by: PODIATRIST

## 2022-11-09 PROCEDURE — 99999 PR PBB SHADOW E&M-EST. PATIENT-LVL IV: CPT | Mod: PBBFAC,,, | Performed by: PODIATRIST

## 2022-11-09 PROCEDURE — 73630 X-RAY EXAM OF FOOT: CPT | Mod: 26,RT,, | Performed by: RADIOLOGY

## 2022-11-09 PROCEDURE — 73630 XR FOOT COMPLETE 3 VIEW RIGHT: ICD-10-PCS | Mod: 26,RT,, | Performed by: RADIOLOGY

## 2022-11-09 PROCEDURE — 99024 PR POST-OP FOLLOW-UP VISIT: ICD-10-PCS | Mod: POP,,, | Performed by: PODIATRIST

## 2022-11-09 RX ORDER — METHYLPREDNISOLONE 4 MG/1
TABLET ORAL
Qty: 21 EACH | Refills: 0 | Status: SHIPPED | OUTPATIENT
Start: 2022-11-09 | End: 2023-01-31

## 2022-11-12 NOTE — PROGRESS NOTES
Sg Sesay is a 46 y.o. male patient.   1. Cuboid syndrome of right foot    2. Foot pain, right      Past Medical History:   Diagnosis Date    Back pain     MS (multiple sclerosis)      No past surgical history pertinent negatives on file.  Scheduled Meds:  Continuous Infusions:  PRN Meds:    Review of patient's allergies indicates:   Allergen Reactions    Iodine and iodide containing products Anaphylaxis    Iodine     Sildenafil     Shellfish containing products Nausea And Vomiting     Other reaction(s): NAUSEA,VOMITING     There are no hospital problems to display for this patient.    Blood pressure 131/86, pulse 78, temperature 97.7 °F (36.5 °C), height 6' (1.829 m), weight 79.4 kg (175 lb).                                                                                  Subjective  Objective:  Vital signs (most recent): Blood pressure 131/86, pulse 78, temperature 97.7 °F (36.5 °C), height 6' (1.829 m), weight 79.4 kg (175 lb).   Assessment & Plan     Patient presents status post excision orthopedic hardware and stabilization with fusion lateral column right.  Patient states he is doing okay he is having a little bit more swelling than previously it is likely because he is doing more and increasing activity.  Overall the patient continues to progress very well he does have pain when he has swelling.  Patient was dispensed a new comp Pro compression sleeve to help with the swelling and give him additional support I have started the patient on a Medrol Dosepak he is going to continue to take his diclofenac as directed.  I do plan to follow up with the patient in 3 weeks he is scheduled to go out of the country on a trip and plans on proceeding with this scheduled trip.  Patient advised all the surgical sites are well healed x-rays look good I had previously noted 1 of the screws had fractured however this is not affecting the patient's stabilization of the fusion site.  Follow-up 3 weeks unless the patient  has any problems questions or concerns sooner.  This note was created using "[employer] voice recognition software that occasionally misinterpreted phrases or words.  Jah Torres DPM  11/12/2022

## 2022-11-21 ENCOUNTER — TELEPHONE (OUTPATIENT)
Dept: PODIATRY | Facility: CLINIC | Age: 47
End: 2022-11-21
Payer: MEDICARE

## 2022-11-21 RX ORDER — OXYCODONE AND ACETAMINOPHEN 10; 325 MG/1; MG/1
1 TABLET ORAL 3 TIMES DAILY
Qty: 90 TABLET | Refills: 0 | Status: SHIPPED | OUTPATIENT
Start: 2022-11-21 | End: 2022-12-12 | Stop reason: SDUPTHER

## 2022-12-01 ENCOUNTER — PATIENT MESSAGE (OUTPATIENT)
Dept: PSYCHIATRY | Facility: CLINIC | Age: 47
End: 2022-12-01
Payer: MEDICARE

## 2022-12-12 ENCOUNTER — HOSPITAL ENCOUNTER (OUTPATIENT)
Dept: RADIOLOGY | Facility: HOSPITAL | Age: 47
Discharge: HOME | End: 2022-12-12
Attending: PODIATRIST
Payer: MEDICARE

## 2022-12-12 ENCOUNTER — TELEPHONE (OUTPATIENT)
Dept: PODIATRY | Facility: CLINIC | Age: 47
End: 2022-12-12
Payer: MEDICARE

## 2022-12-12 ENCOUNTER — OFFICE VISIT (OUTPATIENT)
Dept: PODIATRY | Facility: CLINIC | Age: 47
End: 2022-12-12
Payer: MEDICARE

## 2022-12-12 VITALS
SYSTOLIC BLOOD PRESSURE: 137 MMHG | TEMPERATURE: 98 F | HEIGHT: 72 IN | BODY MASS INDEX: 23.7 KG/M2 | DIASTOLIC BLOOD PRESSURE: 85 MMHG | WEIGHT: 175 LBS | HEART RATE: 76 BPM

## 2022-12-12 DIAGNOSIS — G35 MS (MULTIPLE SCLEROSIS): ICD-10-CM

## 2022-12-12 DIAGNOSIS — M35.7 HYPERMOBILE JOINT SYNDROME OF RIGHT FOOT: ICD-10-CM

## 2022-12-12 DIAGNOSIS — M79.2 NEURITIS: ICD-10-CM

## 2022-12-12 DIAGNOSIS — M35.7 HYPERMOBILE JOINT SYNDROME OF RIGHT FOOT: Primary | ICD-10-CM

## 2022-12-12 PROCEDURE — 99213 OFFICE O/P EST LOW 20 MIN: CPT | Mod: S$PBB,,, | Performed by: PODIATRIST

## 2022-12-12 PROCEDURE — 99213 PR OFFICE/OUTPT VISIT, EST, LEVL III, 20-29 MIN: ICD-10-PCS | Mod: S$PBB,,, | Performed by: PODIATRIST

## 2022-12-12 PROCEDURE — 99999 PR PBB SHADOW E&M-EST. PATIENT-LVL V: CPT | Mod: PBBFAC,,, | Performed by: PODIATRIST

## 2022-12-12 PROCEDURE — 73630 X-RAY EXAM OF FOOT: CPT | Mod: 26,RT,, | Performed by: RADIOLOGY

## 2022-12-12 PROCEDURE — 99215 OFFICE O/P EST HI 40 MIN: CPT | Mod: PBBFAC | Performed by: PODIATRIST

## 2022-12-12 PROCEDURE — 73630 X-RAY EXAM OF FOOT: CPT | Mod: TC,RT

## 2022-12-12 PROCEDURE — 99999 PR PBB SHADOW E&M-EST. PATIENT-LVL V: ICD-10-PCS | Mod: PBBFAC,,, | Performed by: PODIATRIST

## 2022-12-12 PROCEDURE — 73630 XR FOOT COMPLETE 3 VIEW RIGHT: ICD-10-PCS | Mod: 26,RT,, | Performed by: RADIOLOGY

## 2022-12-12 RX ORDER — OXYCODONE AND ACETAMINOPHEN 10; 325 MG/1; MG/1
1 TABLET ORAL 3 TIMES DAILY
Qty: 90 TABLET | Refills: 0 | Status: SHIPPED | OUTPATIENT
Start: 2022-12-12 | End: 2023-01-17 | Stop reason: SDUPTHER

## 2022-12-12 NOTE — TELEPHONE ENCOUNTER
Advised patient that urine screen was ok and that his prescription was sent in. Patient verbalized understanding.    ----- Message from Jah Torres DPM sent at 12/12/2022 10:47 AM CST -----  Call and advise drug screen fine rx sent.  ----- Message -----  From: [facility]  Sent: 12/12/2022  10:19 AM CST  To: Jah Torres DPM

## 2022-12-13 NOTE — PROGRESS NOTES
Subjective:       Patient ID: Sg Sesay is a 46 y.o. male.    Chief Complaint: Follow-up and Post-op Evaluation  Patient presents today with complaint of pain on the right foot.  Patient also has a history of MS as well as chronic pain of both feet with associated neuritis.      Foot Pain  Associated symptoms include joint swelling.   Follow-up  Associated symptoms include joint swelling.   Medication Refill  Associated symptoms include joint swelling.   Toe Pain     Ankle Pain      Review of Systems   Musculoskeletal:  Positive for back pain and joint swelling.   All other systems reviewed and are negative.    Objective:      Physical Exam  Vitals and nursing note reviewed.   Constitutional:       Appearance: Normal appearance. He is well-developed.   Cardiovascular:      Rate and Rhythm: Normal rate and regular rhythm.      Pulses: Normal pulses.           Dorsalis pedis pulses are 2+ on the right side and 2+ on the left side.        Posterior tibial pulses are 2+ on the right side and 2+ on the left side.      Heart sounds: Normal heart sounds.   Pulmonary:      Effort: Pulmonary effort is normal.      Breath sounds: Normal breath sounds.   Musculoskeletal:         General: Swelling, tenderness and deformity present.      Right foot: Decreased range of motion. Deformity present.        Feet:    Feet:      Right foot:      Protective Sensation: 4 sites tested.  4 sites sensed.      Left foot:      Protective Sensation: 4 sites tested.  4 sites sensed.   Skin:     General: Skin is warm.      Capillary Refill: Capillary refill takes less than 2 seconds.   Neurological:      General: No focal deficit present.      Mental Status: He is alert.   Psychiatric:         Behavior: Behavior normal.         Thought Content: Thought content normal.         Judgment: Judgment normal.                          Assessment:       1. Hypermobile joint syndrome of right foot    2. Neuritis    3. MS (multiple sclerosis)         Plan:       Patient presents today with complaint of pain on the right foot.  Patient also has a history of MS as well as chronic pain of both feet with associated neuritis.  Patient previously had surgery to stabilize the lateral column of the right foot he did indicate the Medrol Dosepak that I had given him on his last visit did significantly reduce the inflammation the pain he states he thought he was doing really good until the area flared up again.  Patient was supposed to go on a trip to [facility] he has since put this off until April I did advised the patient the fact that the Medrol Dosepak helped his a positive because clearly this is all inflammatory that he is having the discomfort he is having.  I have referred the patient to physical therapy to help break down the scar tissue help with the inflammation involving the plantar lateral aspect of his right foot I do provide pain management services for the patient due to chronic neuritis of the right foot.  Patient's drug screen was ordered today it was updated and satisfactory he has a current pain management contract on file and was prescribed a prescription for pain medication today.  Patient is using his arch support at all times of weight-bearing and states he can not go without this I am hopeful that physical therapy will help to reduce the inflammation and discomfort he is having.  Plan follow-up will be 1 month patient advised to contact us if he has any problems questions or concerns prior to that time.    This note was created using [employer] voice recognition software that occasionally misinterpreted phrases or words.

## 2023-01-09 ENCOUNTER — TELEPHONE (OUTPATIENT)
Dept: PODIATRY | Facility: CLINIC | Age: 48
End: 2023-01-09
Payer: MEDICARE

## 2023-01-17 ENCOUNTER — OFFICE VISIT (OUTPATIENT)
Dept: PODIATRY | Facility: CLINIC | Age: 48
End: 2023-01-17
Payer: MEDICARE

## 2023-01-17 VITALS
HEART RATE: 74 BPM | SYSTOLIC BLOOD PRESSURE: 142 MMHG | BODY MASS INDEX: 24.84 KG/M2 | DIASTOLIC BLOOD PRESSURE: 92 MMHG | WEIGHT: 183.38 LBS | HEIGHT: 72 IN

## 2023-01-17 DIAGNOSIS — M79.2 NEURITIS: Primary | ICD-10-CM

## 2023-01-17 DIAGNOSIS — S93.311A CUBOID SYNDROME OF RIGHT FOOT: ICD-10-CM

## 2023-01-17 PROCEDURE — 99999 PR PBB SHADOW E&M-EST. PATIENT-LVL IV: ICD-10-PCS | Mod: PBBFAC,,, | Performed by: PODIATRIST

## 2023-01-17 PROCEDURE — 99999 PR PBB SHADOW E&M-EST. PATIENT-LVL IV: CPT | Mod: PBBFAC,,, | Performed by: PODIATRIST

## 2023-01-17 PROCEDURE — 99213 OFFICE O/P EST LOW 20 MIN: CPT | Mod: S$PBB,,, | Performed by: PODIATRIST

## 2023-01-17 PROCEDURE — 99214 OFFICE O/P EST MOD 30 MIN: CPT | Mod: PBBFAC,PN | Performed by: PODIATRIST

## 2023-01-17 PROCEDURE — 99213 PR OFFICE/OUTPT VISIT, EST, LEVL III, 20-29 MIN: ICD-10-PCS | Mod: S$PBB,,, | Performed by: PODIATRIST

## 2023-01-17 RX ORDER — OXYCODONE AND ACETAMINOPHEN 10; 325 MG/1; MG/1
1 TABLET ORAL 3 TIMES DAILY
Qty: 90 TABLET | Refills: 0 | Status: SHIPPED | OUTPATIENT
Start: 2023-01-17 | End: 2023-02-16

## 2023-01-18 NOTE — PROGRESS NOTES
Subjective:       Patient ID: Sg Sesay is a 47 y.o. male.    Chief Complaint: No chief complaint on file.  Patient presents today with complaint of pain on the right foot.  Patient also has a history of MS as well as chronic pain of both feet with associated neuritis.      Foot Pain  Associated symptoms include joint swelling.   Follow-up  Associated symptoms include joint swelling.   Medication Refill  Associated symptoms include joint swelling.   Toe Pain     Ankle Pain      Review of Systems   Musculoskeletal:  Positive for back pain and joint swelling.   All other systems reviewed and are negative.    Objective:      Physical Exam  Vitals and nursing note reviewed.   Constitutional:       Appearance: Normal appearance. He is well-developed.   Cardiovascular:      Rate and Rhythm: Normal rate and regular rhythm.      Pulses: Normal pulses.           Dorsalis pedis pulses are 2+ on the right side and 2+ on the left side.        Posterior tibial pulses are 2+ on the right side and 2+ on the left side.      Heart sounds: Normal heart sounds.   Pulmonary:      Effort: Pulmonary effort is normal.      Breath sounds: Normal breath sounds.   Musculoskeletal:         General: Swelling, tenderness and deformity present.      Right foot: Decreased range of motion. Deformity present.        Feet:    Feet:      Right foot:      Protective Sensation: 4 sites tested.  4 sites sensed.      Left foot:      Protective Sensation: 4 sites tested.  4 sites sensed.   Skin:     General: Skin is warm.      Capillary Refill: Capillary refill takes less than 2 seconds.   Neurological:      General: No focal deficit present.      Mental Status: He is alert.   Psychiatric:         Behavior: Behavior normal.         Thought Content: Thought content normal.         Judgment: Judgment normal.                                        Assessment:       1. Neuritis    2. Cuboid syndrome of right foot        Plan:       Patient presents  today with complaint of pain on the right foot.  Patient also has a history of MS as well as chronic pain of both feet with associated neuritis.  Patient previously had surgery to stabilize the lateral column of the right foot.  Patient was referred to physical therapy states he thinks he is only had 2 or 3 visits he states they really have not done much yet regarding therapy he relates they have not done any cold laser or ultrasound on the area of scar tissue plantar lateral right foot.  Patient states he is scheduled for increasing visits this week and next.  Patient does have some tenderness and some numbness sensation on the outside of his right foot he does have an area of scar tissue underlying the lateral aspect of the right foot that is causing him discomfort.  I want to re-evaluate the patient in a month after he is had additional physical therapy and see how he is doing patient is currently under my care for pain management he has a pain management contract on file and an updated drug screen on file follow-up 1 month unless the patient has any problems questions or concerns sooner.  Plan follow-up will be 1 month patient advised to contact us if he has any problems questions or concerns prior to that time.   Patient presented to my other office today where x-rays were not able to be taken because he had to cancel his previous appointment.  This note was created using [employer] voice recognition software that occasionally misinterpreted phrases or words.

## 2023-01-20 ENCOUNTER — TELEPHONE (OUTPATIENT)
Dept: PODIATRY | Facility: CLINIC | Age: 48
End: 2023-01-20
Payer: MEDICARE

## 2023-01-20 NOTE — TELEPHONE ENCOUNTER
Patient states that physical therapy told him that there's nothing else that they can do for him and that he needs a referral to an orthopedic specialist. Patient states that he wants to know what this provider wants to do. Advised patient that I would pass along the message and contact him with a response. Patient verbalized understanding.    ----- Message from Matilde Smith sent at 1/20/2023  8:25 AM CST -----  Contact: pt  Type:  Needs Medical Advice    Who Called: PT   Symptoms (please be specific): PHYSICAL THERAPY  CONCERSN  How long has patient had these symptoms:    Pharmacy name and phone #:    Would the patient rather a call back or a response via [facility]? CALL   Best Call Back Number: [phone] (home)     Additional Information: THANK YOU

## 2023-01-20 NOTE — TELEPHONE ENCOUNTER
Advised patient of the following regarding his therapy:    Please advised the patient that is very disappointing that physical therapy would give up on him after only a few visits and without any significant ultrasound or cold laser therapy.  Give the patient the option of re-evaluating his situation at his follow-up a month from now or I can see him sooner at which time we can take x-rays and discuss further options.  If the patient wants a sooner appointment it would have to be 1st available.  He will need an x-ray in the [place] office at follow-up.      Patient requested a sooner appointment. Scheduled for patient.

## 2023-01-30 ENCOUNTER — OFFICE VISIT (OUTPATIENT)
Dept: PODIATRY | Facility: CLINIC | Age: 48
End: 2023-01-30
Payer: MEDICARE

## 2023-01-30 ENCOUNTER — TELEPHONE (OUTPATIENT)
Dept: PODIATRY | Facility: CLINIC | Age: 48
End: 2023-01-30
Payer: MEDICARE

## 2023-01-30 VITALS
HEART RATE: 69 BPM | WEIGHT: 183.44 LBS | HEIGHT: 72 IN | DIASTOLIC BLOOD PRESSURE: 87 MMHG | SYSTOLIC BLOOD PRESSURE: 133 MMHG | BODY MASS INDEX: 24.85 KG/M2

## 2023-01-30 DIAGNOSIS — S93.311A CUBOID SYNDROME OF RIGHT FOOT: Primary | ICD-10-CM

## 2023-01-30 DIAGNOSIS — G89.29 CHRONIC PAIN IN RIGHT FOOT: ICD-10-CM

## 2023-01-30 DIAGNOSIS — M79.671 CHRONIC PAIN IN RIGHT FOOT: ICD-10-CM

## 2023-01-30 PROCEDURE — 99214 OFFICE O/P EST MOD 30 MIN: CPT | Mod: S$PBB,,, | Performed by: PODIATRIST

## 2023-01-30 PROCEDURE — 99999 PR PBB SHADOW E&M-EST. PATIENT-LVL IV: CPT | Mod: PBBFAC,,, | Performed by: PODIATRIST

## 2023-01-30 PROCEDURE — 99214 OFFICE O/P EST MOD 30 MIN: CPT | Mod: PBBFAC | Performed by: PODIATRIST

## 2023-01-30 PROCEDURE — 99999 PR PBB SHADOW E&M-EST. PATIENT-LVL IV: ICD-10-PCS | Mod: PBBFAC,,, | Performed by: PODIATRIST

## 2023-01-30 PROCEDURE — 99214 PR OFFICE/OUTPT VISIT, EST, LEVL IV, 30-39 MIN: ICD-10-PCS | Mod: S$PBB,,, | Performed by: PODIATRIST

## 2023-01-30 RX ORDER — TERIFLUNOMIDE 14 MG/1
14 TABLET, FILM COATED ORAL
COMMUNITY
Start: 2023-01-13

## 2023-01-30 NOTE — TELEPHONE ENCOUNTER
Spoke with patient and verbalized he has 15 minute window from appointment time or he will have to reschedule. Patient verbalized understanding and thinks he will make the appt in time.

## 2023-01-31 NOTE — PROGRESS NOTES
Subjective:       Patient ID: Sg Sesay is a 47 y.o. male.    Chief Complaint: Follow-up  Patient presents today with complaint of pain on the right foot.  Patient also has a history of MS as well as chronic pain of both feet with associated neuritis.      Foot Pain  Associated symptoms include joint swelling.   Follow-up  Associated symptoms include joint swelling.   Medication Refill  Associated symptoms include joint swelling.   Toe Pain     Ankle Pain      Review of Systems   Musculoskeletal:  Positive for back pain and joint swelling.   All other systems reviewed and are negative.    Objective:      Physical Exam  Vitals and nursing note reviewed.   Constitutional:       Appearance: Normal appearance. He is well-developed.   Cardiovascular:      Rate and Rhythm: Normal rate and regular rhythm.      Pulses: Normal pulses.           Dorsalis pedis pulses are 2+ on the right side and 2+ on the left side.        Posterior tibial pulses are 2+ on the right side and 2+ on the left side.      Heart sounds: Normal heart sounds.   Pulmonary:      Effort: Pulmonary effort is normal.      Breath sounds: Normal breath sounds.   Musculoskeletal:         General: Swelling, tenderness and deformity present.      Right foot: Decreased range of motion. Deformity present.        Feet:    Feet:      Right foot:      Protective Sensation: 4 sites tested.  4 sites sensed.      Left foot:      Protective Sensation: 4 sites tested.  4 sites sensed.   Skin:     General: Skin is warm.      Capillary Refill: Capillary refill takes less than 2 seconds.   Neurological:      General: No focal deficit present.      Mental Status: He is alert.   Psychiatric:         Behavior: Behavior normal.         Thought Content: Thought content normal.         Judgment: Judgment normal.                                                          Assessment:       1. Cuboid syndrome of right foot    2. Chronic pain in right foot        Plan:        Patient presents today with complaint of pain on the right foot.  Patient also has a history of MS as well as chronic pain of both feet with associated neuritis.  Patient previously had surgery to stabilize the lateral column of the right foot.  Patient was referred to physical therapy to help breakdown scar tissue decrease the patient's discomfort on the plantar lateral aspect of the right foot patient only had a few visits with physical therapy before they told him they did not think they could help the patient to reduce the inflammation and pain that he is having patient states he has subsequently discontinued physical therapy.  Patient states his primary area of pain is directly underlying the fusion site at the base of the 5th metatarsal and cuboid while the area is stabilize the patient primarily is complaining that he feels like he is walking on a lump on the bottom of his foot I had previously elevated both the cuboid and the 5th metatarsal however the patient still having a lot of discomfort in this area at this point I advised the patient I feel the only option would be to surgically remove all the hardware in the area resect a portion of the cuboid and base of the 5th metatarsal that is causing the pressure and the discomfort the patient is having I advised the patient I would need to do a tendon transfer of the peroneus brevis onto the remaining portion of the cuboid bone while preserving the peroneus longus in its normal anatomic alignment I did make the patient aware this is no guarantee that this is going to eliminate any of the nerve pain numbness and tingling that he is having however the patient states he can deal with the numbness and tingling as long as it relieves the aching and discomfort he is having from walking on those bones.  Patient advised this is definitely something to think about carefully but at this point I think it is going to be the only option since conservative treatment and  previous surgical treatment including a lateral column fusion has not eliminated the patient's area of discomfort on the plantar lateral aspect of the right foot.  Patient states it feels like he is walking on a big egg in this area.  Patient wears his arch supports at all times and states he can absolutely not walk without the arch supports.  Patient is going to consider surgery as discussed with him today and contact us with any problems questions or concerns.  This note was created using [employer] voice recognition software that occasionally misinterpreted phrases or words.

## 2023-02-20 ENCOUNTER — PATIENT MESSAGE (OUTPATIENT)
Dept: PSYCHIATRY | Facility: CLINIC | Age: 48
End: 2023-02-20
Payer: MEDICARE

## 2023-02-23 ENCOUNTER — TELEPHONE (OUTPATIENT)
Dept: PODIATRY | Facility: CLINIC | Age: 48
End: 2023-02-23
Payer: MEDICARE

## 2023-02-23 RX ORDER — OXYCODONE AND ACETAMINOPHEN 10; 325 MG/1; MG/1
1 TABLET ORAL 3 TIMES DAILY
Qty: 90 TABLET | Refills: 0 | Status: SHIPPED | OUTPATIENT
Start: 2023-02-23 | End: 2023-03-25

## 2023-02-23 NOTE — TELEPHONE ENCOUNTER
Refill request:  Percocet 10/325  One by mouth three times a day as needed.  #90 no refill    Start date was: 1/17/23  End date: 2/16/23    Pharmacy:  [facility]

## 2023-02-23 NOTE — TELEPHONE ENCOUNTER
----- Message from Mary Meraz sent at 2/23/2023 12:50 PM CST -----  Contact: self  Type:  RX Refill Request    Who Called: self  Refill or New Rx:refill  RX Name and Strength:Oxycodone  mg  How is the patient currently taking it? (ex. 1XDay):as directed  Is this a 30 day or 90 day RX:30  Preferred Pharmacy with phone number:  [id number], [place] - [address] [place] [address] AT [facility] [address]  [address] [place] [address]  Phone: [phone] Fax: [phone]      Local or Mail Order:local  Ordering Provider:   Would the patient rather a call back or a response via [facility]? call  Best Call Back Number:[phone] (home)     Additional Information: pt needs refill on rx. Didn't see rx in his file. Please advise and thank you.

## 2023-02-27 ENCOUNTER — OFFICE VISIT (OUTPATIENT)
Dept: PODIATRY | Facility: CLINIC | Age: 48
End: 2023-02-27
Payer: MEDICARE

## 2023-02-27 ENCOUNTER — TELEPHONE (OUTPATIENT)
Dept: PODIATRY | Facility: CLINIC | Age: 48
End: 2023-02-27
Payer: MEDICARE

## 2023-02-27 ENCOUNTER — HOSPITAL ENCOUNTER (EMERGENCY)
Facility: HOSPITAL | Age: 48
Discharge: HOME | End: 2023-02-27
Attending: EMERGENCY MEDICINE
Payer: MEDICARE

## 2023-02-27 ENCOUNTER — HOSPITAL ENCOUNTER (OUTPATIENT)
Dept: RADIOLOGY | Facility: HOSPITAL | Age: 48
Discharge: HOME | End: 2023-02-27
Attending: PODIATRIST
Payer: MEDICARE

## 2023-02-27 VITALS
BODY MASS INDEX: 23.7 KG/M2 | OXYGEN SATURATION: 95 % | TEMPERATURE: 98 F | HEART RATE: 86 BPM | DIASTOLIC BLOOD PRESSURE: 95 MMHG | WEIGHT: 175 LBS | HEIGHT: 72 IN | RESPIRATION RATE: 20 BRPM | SYSTOLIC BLOOD PRESSURE: 151 MMHG

## 2023-02-27 VITALS
BODY MASS INDEX: 24.79 KG/M2 | SYSTOLIC BLOOD PRESSURE: 132 MMHG | WEIGHT: 183 LBS | HEART RATE: 80 BPM | DIASTOLIC BLOOD PRESSURE: 83 MMHG | HEIGHT: 72 IN

## 2023-02-27 DIAGNOSIS — M54.32 SCIATICA OF LEFT SIDE: Primary | ICD-10-CM

## 2023-02-27 DIAGNOSIS — M79.2 NEURITIS: ICD-10-CM

## 2023-02-27 DIAGNOSIS — S93.311A CUBOID SYNDROME OF RIGHT FOOT: ICD-10-CM

## 2023-02-27 DIAGNOSIS — S93.311A CUBOID SYNDROME OF RIGHT FOOT: Primary | ICD-10-CM

## 2023-02-27 PROCEDURE — 99999 PR PBB SHADOW E&M-EST. PATIENT-LVL III: ICD-10-PCS | Mod: PBBFAC,,, | Performed by: PODIATRIST

## 2023-02-27 PROCEDURE — 73630 X-RAY EXAM OF FOOT: CPT | Mod: TC,RT

## 2023-02-27 PROCEDURE — 99284 EMERGENCY DEPT VISIT MOD MDM: CPT | Mod: 27

## 2023-02-27 PROCEDURE — 63600175 PHARM REV CODE 636 W HCPCS: Performed by: NURSE PRACTITIONER

## 2023-02-27 PROCEDURE — 99213 OFFICE O/P EST LOW 20 MIN: CPT | Mod: PBBFAC | Performed by: PODIATRIST

## 2023-02-27 PROCEDURE — 73630 XR FOOT COMPLETE 3 VIEW RIGHT: ICD-10-PCS | Mod: 26,RT,, | Performed by: RADIOLOGY

## 2023-02-27 PROCEDURE — 99213 OFFICE O/P EST LOW 20 MIN: CPT | Mod: S$PBB,,, | Performed by: PODIATRIST

## 2023-02-27 PROCEDURE — 96372 THER/PROPH/DIAG INJ SC/IM: CPT | Performed by: NURSE PRACTITIONER

## 2023-02-27 PROCEDURE — 99999 PR PBB SHADOW E&M-EST. PATIENT-LVL III: CPT | Mod: PBBFAC,,, | Performed by: PODIATRIST

## 2023-02-27 PROCEDURE — 99213 PR OFFICE/OUTPT VISIT, EST, LEVL III, 20-29 MIN: ICD-10-PCS | Mod: S$PBB,,, | Performed by: PODIATRIST

## 2023-02-27 PROCEDURE — 73630 X-RAY EXAM OF FOOT: CPT | Mod: 26,RT,, | Performed by: RADIOLOGY

## 2023-02-27 RX ORDER — CYCLOBENZAPRINE HCL 10 MG
10 TABLET ORAL 3 TIMES DAILY PRN
Qty: 30 TABLET | Refills: 0 | Status: SHIPPED | OUTPATIENT
Start: 2023-02-27 | End: 2023-03-09

## 2023-02-27 RX ORDER — METHYLPREDNISOLONE SOD SUCC 125 MG
125 VIAL (EA) INJECTION
Status: COMPLETED | OUTPATIENT
Start: 2023-02-27 | End: 2023-02-27

## 2023-02-27 RX ORDER — ONDANSETRON HYDROCHLORIDE 8 MG/1
8 TABLET, FILM COATED ORAL 3 TIMES DAILY
COMMUNITY
Start: 2023-02-03 | End: 2023-06-28

## 2023-02-27 RX ORDER — DICLOFENAC SODIUM 75 MG/1
75 TABLET, DELAYED RELEASE ORAL 2 TIMES DAILY
COMMUNITY
Start: 2023-02-07 | End: 2023-05-31 | Stop reason: SDUPTHER

## 2023-02-27 RX ORDER — KETOROLAC TROMETHAMINE 30 MG/ML
60 INJECTION, SOLUTION INTRAMUSCULAR; INTRAVENOUS
Status: COMPLETED | OUTPATIENT
Start: 2023-02-27 | End: 2023-02-27

## 2023-02-27 RX ADMIN — KETOROLAC TROMETHAMINE 60 MG: 30 INJECTION, SOLUTION INTRAMUSCULAR; INTRAVENOUS at 10:02

## 2023-02-27 RX ADMIN — METHYLPREDNISOLONE SODIUM SUCCINATE 125 MG: 125 INJECTION, POWDER, FOR SOLUTION INTRAMUSCULAR; INTRAVENOUS at 10:02

## 2023-02-27 NOTE — PROGRESS NOTES
Subjective:       Patient ID: Sg Sesay is a 47 y.o. male.    Chief Complaint: Foot Pain and Follow-up  Patient presents today with complaint of pain on the right foot.  Patient also has a history of MS as well as chronic pain of both feet with associated neuritis.      Foot Pain  Associated symptoms include joint swelling.   Follow-up  Associated symptoms include joint swelling.   Medication Refill  Associated symptoms include joint swelling.   Toe Pain     Ankle Pain      Review of Systems   Musculoskeletal:  Positive for back pain and joint swelling.   All other systems reviewed and are negative.    Objective:      Physical Exam  Vitals and nursing note reviewed.   Constitutional:       Appearance: Normal appearance. He is well-developed.   Cardiovascular:      Rate and Rhythm: Normal rate and regular rhythm.      Pulses: Normal pulses.           Dorsalis pedis pulses are 2+ on the right side and 2+ on the left side.        Posterior tibial pulses are 2+ on the right side and 2+ on the left side.      Heart sounds: Normal heart sounds.   Pulmonary:      Effort: Pulmonary effort is normal.      Breath sounds: Normal breath sounds.   Musculoskeletal:         General: Swelling, tenderness and deformity present.      Right foot: Decreased range of motion. Deformity present.        Feet:    Feet:      Right foot:      Protective Sensation: 4 sites tested.  4 sites sensed.      Left foot:      Protective Sensation: 4 sites tested.  4 sites sensed.   Skin:     General: Skin is warm.      Capillary Refill: Capillary refill takes less than 2 seconds.   Neurological:      General: No focal deficit present.      Mental Status: He is alert.   Psychiatric:         Behavior: Behavior normal.         Thought Content: Thought content normal.         Judgment: Judgment normal.                                                          Assessment:       1. Cuboid syndrome of right foot    2. Neuritis        Plan:        Patient presents today with complaint of pain on the right foot.  Patient also has a history of MS as well as chronic pain of both feet with associated neuritis.  Patient states there is really been no change in his right foot he is considering surgery as I have previously discussed to remove the hardware and resect the base of the 5th metatarsal and a portion of the cuboid bone that has become very prominent causing discomfort on the plantar surface of the patient's right foot.  Patient today is complaining of severe back pain that is radiating down the left side of his body all the way down the back of his leg he [facility] he states he is not had any problem like this previously and thinks he may have a pinched nerve he is very uncomfortable finding it difficult to stand or walk.  Patient does see me for pain management for chronic neuritis of the right foot I did order a drug screen today he currently has an updated pain management contract on file.  Plan follow-up will be 1 month.  Patient will consider surgery as previously discussed.  Patient wears his arch supports at all times and states he can absolutely not walk without the arch supports.  Patient is going to consider surgery as discussed with him today and contact us with any problems questions or concerns.  This note was created using [employer] voice recognition software that occasionally misinterpreted phrases or words.

## 2023-02-27 NOTE — DISCHARGE INSTRUCTIONS
Rest, increase fluids, lots of water and liquids.  Call office for recheck.  Return as needed for worsening symptoms.  Stretching exercises as we discussed

## 2023-02-27 NOTE — ED PROVIDER NOTES
Encounter Date: 2/27/2023       History     Chief Complaint   Patient presents with    Back Pain     Low back pain radiating down L leg x 5 days. Denies injury.     POV to the ED alone.  Patient complains of left lower back pain that radiates into the left buttocks area for 5 days.  He denies fall or injury.  Denies associated symptoms, [facility] no other complaints    The history is provided by the patient.   Review of patient's allergies indicates:   Allergen Reactions    Iodine and iodide containing products Anaphylaxis    Iodine     Shellfish containing products Nausea And Vomiting     Other reaction(s): NAUSEA,VOMITING     Past Medical History:   Diagnosis Date    Back pain     MS (multiple sclerosis)      Past Surgical History:   Procedure Laterality Date    FOOT ARTHRODESIS  2013    FUSION OF METATARSOPHALANGEAL JOINT Right 8/12/2022    Procedure: FUSION, MTP JOINT paragon 28 plates and screws;  Surgeon: Jah Torres DPM;  Location: [facility] OR;  Service: Podiatry;  Laterality: Right;    INJECTION OF JOINT Right 5/25/2021    Procedure: Injection, Joint Multiple injections;  Surgeon: Jah Torres DPM;  Location: [facility] OR;  Service: Podiatry;  Laterality: Right;    INJECTION OF JOINT Right 9/28/2021    Procedure: Injection, Joint;  Surgeon: Jah Torres DPM;  Location: [facility] OR;  Service: Podiatry;  Laterality: Right;    INJECTION, TENDON SHEATH OR LIGAMENT, 1 TENDON SHEATH OR LIGAMENT Right 5/25/2021    Procedure: INJECTION,TENDON SHEATH OR LIGAMENT,1 TENDON SHEATH OR LIGAMENT;  Surgeon: Jah Torres DPM;  Location: [facility] OR;  Service: Podiatry;  Laterality: Right;    INJECTION, TENDON SHEATH OR LIGAMENT, 1 TENDON SHEATH OR LIGAMENT Right 9/28/2021    Procedure: INJECTION,TENDON SHEATH OR LIGAMENT,1 TENDON SHEATH OR LIGAMENT;  Surgeon: Jah Torres DPM;  Location: [facility] OR;  Service: Podiatry;  Laterality: Right;    MIDFOOT ARTHRODESIS Right 2/2/2021    Procedure: FUSION, JOINT,  MIDFOOT Mallory bone wedges and fixation screws & plates;  Surgeon: Jah Torres DPM;  Location: [facility] OR;  Service: Podiatry;  Laterality: Right;    PLANTAR FASCIA SURGERY  2012, 2013    RADIOFREQUENCY ABLATION      tarsal tunnel  2012, 2013    TARSAL TUNNEL RELEASE Right 2/2/2021    Procedure: RELEASE, TARSAL TUNNEL Nerve Ablation right;  Surgeon: Jah Torres DPM;  Location: [facility] OR;  Service: Podiatry;  Laterality: Right;     Family History   Problem Relation Age of Onset    Diabetes Neg Hx      Social History     Tobacco Use    Smoking status: Never     Passive exposure: Never    Smokeless tobacco: Never   Substance Use Topics    Alcohol use: No    Drug use: No     Review of Systems   Constitutional:  Negative for fever.   HENT:  Negative for ear pain and sore throat.    Respiratory:  Negative for cough and shortness of breath.    Cardiovascular:  Negative for chest pain.   Gastrointestinal:  Negative for abdominal pain, diarrhea, nausea and vomiting.   Genitourinary:  Negative for decreased urine volume and dysuria.        Denies incontinence   Musculoskeletal:  Positive for back pain.   All other systems reviewed and are negative.    Physical Exam     Initial Vitals [02/27/23 1003]   BP Pulse Resp Temp SpO2   (!) 151/95 86 20 97.7 °F (36.5 °C) 95 %      MAP       --         Physical Exam    Nursing note and vitals reviewed.  Constitutional: He appears well-developed and well-nourished. No distress.   HENT:   Head: Normocephalic and atraumatic.   Mouth/Throat: Oropharynx is clear and moist.   Eyes: Pupils are equal, round, and reactive to light.   Neck:   Normal range of motion.  Cardiovascular:  Normal rate and regular rhythm.           Pulmonary/Chest: Breath sounds normal.   Abdominal: Abdomen is soft. Bowel sounds are normal. There is no abdominal tenderness.   Musculoskeletal:         General: Normal range of motion.      Cervical back: Normal range of motion.      Comments: Point tenderness  noted to the left SI joint, no rash or swelling, no redness or warmth     Neurological: He is alert and oriented to person, place, and time. He has normal strength. GCS score is 15. GCS eye subscore is 4. GCS verbal subscore is 5. GCS motor subscore is 6.   Skin: Skin is warm and dry.   Psychiatric: He has a normal mood and affect. His behavior is normal. Judgment and thought content normal.       ED Course   Procedures  Labs Reviewed - No data to display       Imaging Results    None          Medications   ketorolac injection 60 mg (has no administration in time range)   methylPREDNISolone sodium succinate injection 125 mg (has no administration in time range)     Medical Decision Making:   Initial Assessment:   Presents for evaluation of left lower back pain, atraumatic.  Disposition pending  Differential Diagnosis:   Chronic pain, sciatica, brain, sprain  ED Management:  Patient is medicated with Toradol 60 IM, Solu-Medrol 125 IM.  Prescriptions for Flexeril and diclofenac.  [facility] with [facility] of care                        Clinical Impression:   Final diagnoses:  [M54.32] Sciatica of left side (Primary)        ED Disposition Condition    Discharge Stable          ED Prescriptions       Medication Sig Dispense Start Date End Date Auth. Provider    cyclobenzaprine (FLEXERIL) 10 MG tablet Take 1 tablet (10 mg total) by mouth 3 (three) times daily as needed for Muscle spasms. 30 tablet 2/27/2023 3/9/2023 Alida Esparza NP          Follow-up Information       Follow up With Specialties Details Why Contact Info    [facility]  In 3 days OFFICE RECHECK AND BLOOD PRESSURE RECHECK              Alida Esparza NP  02/27/23 1037

## 2023-03-15 RX ORDER — PREGABALIN 150 MG/1
CAPSULE ORAL
Qty: 90 CAPSULE | OUTPATIENT
Start: 2023-03-15

## 2023-03-16 RX ORDER — PREGABALIN 200 MG/1
200 CAPSULE ORAL 3 TIMES DAILY
Qty: 90 CAPSULE | Refills: 5 | Status: SHIPPED | OUTPATIENT
Start: 2023-03-16 | End: 2023-05-31 | Stop reason: SDUPTHER

## 2023-03-16 NOTE — TELEPHONE ENCOUNTER
"Patient stated that he was taking the 200 mg Lyrica, not the 150 mg Lyrica. ----- Message from Jah Torres DPM sent at 3/16/2023  4:36 PM CDT -----  Regarding: RE: rx refill  I do not know what this message means is the patient taking the 200 mg of Lyrica as I had previously prescribed or is he taking the 150 mg"?  ----- Message -----  From: Alisa Matute LPN  Sent: 3/16/2023   2:37 PM CDT  To: Jah Torres DPM  Subject: FW: rx refill                                    Thank you.  ----- Message -----  From: Radha Tan  Sent: 3/16/2023   2:29 PM CDT  To: [facility] Staff  Subject: rx refill                                        Who Called:FABIANA PALACIOS [[id number]     Refill or New Rx.:Rx refill    pregabalin (LYRICA) 150 MG capsule      Preferred Pharmacy with phone number:   [id number] - [place], [place] - [address] [place] [address] AT [facility] [address]  [address] [address]  Phone: [phone] Fax: [phone]         Best Call Back Number:[phone]       Additional Information:            "

## 2023-04-05 ENCOUNTER — OFFICE VISIT (OUTPATIENT)
Dept: PODIATRY | Facility: CLINIC | Age: 48
End: 2023-04-05
Payer: MEDICARE

## 2023-04-05 VITALS
SYSTOLIC BLOOD PRESSURE: 122 MMHG | HEIGHT: 72 IN | DIASTOLIC BLOOD PRESSURE: 73 MMHG | WEIGHT: 175 LBS | HEART RATE: 83 BPM | BODY MASS INDEX: 23.7 KG/M2

## 2023-04-05 DIAGNOSIS — M79.2 NEURITIS: Primary | ICD-10-CM

## 2023-04-05 DIAGNOSIS — G35 MS (MULTIPLE SCLEROSIS): ICD-10-CM

## 2023-04-05 DIAGNOSIS — M19.071 PRIMARY OSTEOARTHRITIS OF RIGHT FOOT: ICD-10-CM

## 2023-04-05 DIAGNOSIS — M35.7 HYPERMOBILE JOINT SYNDROME OF RIGHT FOOT: ICD-10-CM

## 2023-04-05 DIAGNOSIS — G89.29 CHRONIC PAIN IN RIGHT FOOT: ICD-10-CM

## 2023-04-05 DIAGNOSIS — M79.671 CHRONIC PAIN IN RIGHT FOOT: ICD-10-CM

## 2023-04-05 DIAGNOSIS — T84.84XA PAINFUL ORTHOPAEDIC HARDWARE: ICD-10-CM

## 2023-04-05 PROCEDURE — 99999 PR PBB SHADOW E&M-EST. PATIENT-LVL III: ICD-10-PCS | Mod: PBBFAC,,, | Performed by: PODIATRIST

## 2023-04-05 PROCEDURE — 99213 OFFICE O/P EST LOW 20 MIN: CPT | Mod: S$PBB,,, | Performed by: PODIATRIST

## 2023-04-05 PROCEDURE — 99213 PR OFFICE/OUTPT VISIT, EST, LEVL III, 20-29 MIN: ICD-10-PCS | Mod: S$PBB,,, | Performed by: PODIATRIST

## 2023-04-05 PROCEDURE — 99213 OFFICE O/P EST LOW 20 MIN: CPT | Mod: PBBFAC | Performed by: PODIATRIST

## 2023-04-05 PROCEDURE — 99999 PR PBB SHADOW E&M-EST. PATIENT-LVL III: CPT | Mod: PBBFAC,,, | Performed by: PODIATRIST

## 2023-04-05 RX ORDER — OXYCODONE AND ACETAMINOPHEN 10; 325 MG/1; MG/1
1 TABLET ORAL 3 TIMES DAILY
Qty: 90 TABLET | Refills: 0 | Status: SHIPPED | OUTPATIENT
Start: 2023-04-05 | End: 2023-05-03 | Stop reason: SDUPTHER

## 2023-04-09 NOTE — PROGRESS NOTES
Subjective:       Patient ID: Sg Sesay is a 47 y.o. male.    Chief Complaint: Follow-up and Foot Pain  Patient presents today with complaint of pain on the right foot.  Patient also has a history of MS as well as chronic pain of both feet with associated neuritis.      Foot Pain  Associated symptoms include joint swelling.   Follow-up  Associated symptoms include joint swelling.   Medication Refill  Associated symptoms include joint swelling.   Toe Pain     Ankle Pain      Review of Systems   Musculoskeletal:  Positive for back pain and joint swelling.   All other systems reviewed and are negative.    Objective:      Physical Exam  Vitals and nursing note reviewed.   Constitutional:       Appearance: Normal appearance. He is well-developed.   Cardiovascular:      Rate and Rhythm: Normal rate and regular rhythm.      Pulses: Normal pulses.           Dorsalis pedis pulses are 2+ on the right side and 2+ on the left side.        Posterior tibial pulses are 2+ on the right side and 2+ on the left side.      Heart sounds: Normal heart sounds.   Pulmonary:      Effort: Pulmonary effort is normal.      Breath sounds: Normal breath sounds.   Musculoskeletal:         General: Swelling, tenderness and deformity present.      Right foot: Decreased range of motion. Deformity present.        Feet:    Feet:      Right foot:      Protective Sensation: 4 sites tested.  4 sites sensed.      Left foot:      Protective Sensation: 4 sites tested.  4 sites sensed.   Skin:     General: Skin is warm.      Capillary Refill: Capillary refill takes less than 2 seconds.   Neurological:      General: No focal deficit present.      Mental Status: He is alert.   Psychiatric:         Behavior: Behavior normal.         Thought Content: Thought content normal.         Judgment: Judgment normal.                                                          Assessment:       1. Neuritis    2. Chronic pain in right foot    3. Primary  No retinal holes or tears seen on exam. Recommended OBSERVATION. We reviewed the signs and symptoms of retinal tear/retinal detachment and the importance of prompt evaluation should there be increasing floaters, new flashing lights, or decreasing peripheral vision in either eye at any time. Patient understands condition, prognosis and need for follow up care. osteoarthritis of right foot    4. Painful orthopaedic hardware    5. MS (multiple sclerosis)    6. Hypermobile joint syndrome of right foot        Plan:       Patient presents today with complaint of pain on the right foot.  Patient also has a history of MS as well as chronic pain of both feet with associated neuritis.  Patient states there is really been no change in his right foot he is considering surgery as I have previously discussed to remove the hardware and resect the base of the 5th metatarsal and a portion of the cuboid bone that has become very prominent causing discomfort on the plantar surface of the patient's right foot.  Patient today is complaining of severe back pain that is radiating down the left side of his body all the way down the back of his leg he states [facility] for [facility] he states he is not had any problem like this previously and thinks he may have a pinched nerve he is very uncomfortable finding it difficult to stand or walk.  Patient does see me for pain management for chronic neuritis of the right foot.  Plan follow-up will be 1 month.  Patient will consider surgery as previously discussed.  Patient wears his arch supports at all times and states he can absolutely not walk without the arch supports.  Patient is going to consider surgery as discussed with him today and contact us with any problems questions or concerns.  Patient indicated today he is likely going to have to pursue the surgery that I have discussed to resect a portion of the base of the 5th metatarsal cuboid bone area removal of hardware that he has a lot of things going on right now with kids graduating from school certainly this is nothing that he has to pursue immediately but it is definitely something to consider and he will contact us when he is ready to pursue surgery as discussed follow-up 1 month.  This note was created using [employer] voice  recognition software that occasionally misinterpreted phrases or words.

## 2023-04-19 ENCOUNTER — PATIENT MESSAGE (OUTPATIENT)
Dept: RESEARCH | Facility: HOSPITAL | Age: 48
End: 2023-04-19
Payer: MEDICARE

## 2023-05-03 ENCOUNTER — OFFICE VISIT (OUTPATIENT)
Dept: PODIATRY | Facility: CLINIC | Age: 48
End: 2023-05-03
Payer: MEDICARE

## 2023-05-03 ENCOUNTER — HOSPITAL ENCOUNTER (OUTPATIENT)
Dept: RADIOLOGY | Facility: HOSPITAL | Age: 48
Discharge: HOME | End: 2023-05-03
Attending: PODIATRIST
Payer: MEDICARE

## 2023-05-03 VITALS
BODY MASS INDEX: 23.7 KG/M2 | HEIGHT: 72 IN | WEIGHT: 175 LBS | DIASTOLIC BLOOD PRESSURE: 87 MMHG | HEART RATE: 76 BPM | SYSTOLIC BLOOD PRESSURE: 154 MMHG

## 2023-05-03 DIAGNOSIS — M79.2 NEURITIS: ICD-10-CM

## 2023-05-03 DIAGNOSIS — S93.311A CUBOID SYNDROME OF RIGHT FOOT: ICD-10-CM

## 2023-05-03 DIAGNOSIS — G35 MS (MULTIPLE SCLEROSIS): ICD-10-CM

## 2023-05-03 DIAGNOSIS — T84.84XA PAINFUL ORTHOPAEDIC HARDWARE: ICD-10-CM

## 2023-05-03 DIAGNOSIS — T84.84XA PAINFUL ORTHOPAEDIC HARDWARE: Primary | ICD-10-CM

## 2023-05-03 PROCEDURE — 99214 OFFICE O/P EST MOD 30 MIN: CPT | Mod: PBBFAC | Performed by: PODIATRIST

## 2023-05-03 PROCEDURE — 99999 PR PBB SHADOW E&M-EST. PATIENT-LVL IV: ICD-10-PCS | Mod: PBBFAC,,, | Performed by: PODIATRIST

## 2023-05-03 PROCEDURE — 99213 OFFICE O/P EST LOW 20 MIN: CPT | Mod: S$PBB,,, | Performed by: PODIATRIST

## 2023-05-03 PROCEDURE — 73630 XR FOOT COMPLETE 3 VIEW RIGHT: ICD-10-PCS | Mod: 26,RT,, | Performed by: RADIOLOGY

## 2023-05-03 PROCEDURE — 99999 PR PBB SHADOW E&M-EST. PATIENT-LVL IV: CPT | Mod: PBBFAC,,, | Performed by: PODIATRIST

## 2023-05-03 PROCEDURE — 73630 X-RAY EXAM OF FOOT: CPT | Mod: TC,RT

## 2023-05-03 PROCEDURE — 99213 PR OFFICE/OUTPT VISIT, EST, LEVL III, 20-29 MIN: ICD-10-PCS | Mod: S$PBB,,, | Performed by: PODIATRIST

## 2023-05-03 PROCEDURE — 73630 X-RAY EXAM OF FOOT: CPT | Mod: 26,RT,, | Performed by: RADIOLOGY

## 2023-05-03 RX ORDER — OXYCODONE AND ACETAMINOPHEN 10; 325 MG/1; MG/1
1 TABLET ORAL 3 TIMES DAILY
Qty: 90 TABLET | Refills: 0 | Status: SHIPPED | OUTPATIENT
Start: 2023-05-03 | End: 2023-05-31 | Stop reason: SDUPTHER

## 2023-05-03 NOTE — PROGRESS NOTES
Subjective:       Patient ID: Sg Sesay is a 47 y.o. male.    Chief Complaint: Foot Problem and Follow-up  Patient presents today with complaint of pain on the right foot.  Patient also has a history of MS as well as chronic pain of both feet with associated neuritis.      Foot Pain  Associated symptoms include joint swelling.   Follow-up  Associated symptoms include joint swelling.   Medication Refill  Associated symptoms include joint swelling.   Toe Pain     Ankle Pain      Review of Systems   Musculoskeletal:  Positive for back pain and joint swelling.   All other systems reviewed and are negative.    Objective:      Physical Exam  Vitals and nursing note reviewed.   Constitutional:       Appearance: Normal appearance. He is well-developed.   Cardiovascular:      Rate and Rhythm: Normal rate and regular rhythm.      Pulses: Normal pulses.           Dorsalis pedis pulses are 2+ on the right side and 2+ on the left side.        Posterior tibial pulses are 2+ on the right side and 2+ on the left side.      Heart sounds: Normal heart sounds.   Pulmonary:      Effort: Pulmonary effort is normal.      Breath sounds: Normal breath sounds.   Musculoskeletal:         General: Swelling, tenderness and deformity present.      Right foot: Decreased range of motion. Deformity present.        Feet:    Feet:      Right foot:      Protective Sensation: 4 sites tested.  4 sites sensed.      Left foot:      Protective Sensation: 4 sites tested.  4 sites sensed.   Skin:     General: Skin is warm.      Capillary Refill: Capillary refill takes less than 2 seconds.   Neurological:      General: No focal deficit present.      Mental Status: He is alert.   Psychiatric:         Behavior: Behavior normal.         Thought Content: Thought content normal.         Judgment: Judgment normal.                                                                Assessment:       1. Painful orthopaedic hardware    2. Cuboid syndrome of  right foot    3. MS (multiple sclerosis)    4. Neuritis        Plan:       Patient presents today with complaint of pain on the right foot.  Patient also has a history of MS as well as chronic pain of both feet with associated neuritis.  Patient states there is really been no change in his right foot he is considering surgery as I have previously discussed to remove the hardware and resect the base of the 5th metatarsal and a portion of the cuboid bone that has become very prominent causing discomfort on the plantar surface of the patient's right foot.  Patient does see me for pain management for chronic neuritis of the right foot.  Plan follow-up will be 1 month.  Patient will consider surgery as previously discussed.  Patient wears his arch supports at all times and states he can absolutely not walk without the arch supports.  Patient is going to consider surgery as discussed with him today and contact us with any problems questions or concerns.  Patient indicated today he is likely going to have to pursue the surgery that I have discussed to resect a portion of the base of the 5th metatarsal cuboid bone area removal of hardware that he has a lot of things going on right now with kids graduating from school certainly this is nothing that he has to pursue immediately but it is definitely something to consider and he will contact us when he is ready to pursue surgery as discussed follow-up 1 month.  Patient will need a drug screen prior to next month visit.  X-rays reviewed today no significant change noted.  This note was created using [email or web address] voice recognition software that occasionally misinterpreted phrases or words.

## 2023-05-09 ENCOUNTER — TELEPHONE (OUTPATIENT)
Dept: PODIATRY | Facility: CLINIC | Age: 48
End: 2023-05-09
Payer: MEDICARE

## 2023-05-09 ENCOUNTER — LAB VISIT (OUTPATIENT)
Dept: LAB | Facility: HOSPITAL | Age: 48
End: 2023-05-09
Attending: PODIATRIST
Payer: MEDICARE

## 2023-05-09 DIAGNOSIS — M79.2 NEURITIS: ICD-10-CM

## 2023-05-09 LAB
AMPHET+METHAMPHET UR QL: NEGATIVE
BARBITURATES UR QL SCN>200 NG/ML: NEGATIVE
BENZODIAZ UR QL SCN>200 NG/ML: NEGATIVE
BZE UR QL SCN: NEGATIVE
CANNABINOIDS UR QL SCN: NEGATIVE
CREAT UR-MCNC: 325.5 MG/DL (ref 23–375)
METHADONE UR QL SCN>300 NG/ML: NEGATIVE
OPIATES UR QL SCN: ABNORMAL
PCP UR QL SCN>25 NG/ML: NEGATIVE
TOXICOLOGY INFORMATION: ABNORMAL

## 2023-05-09 PROCEDURE — 80307 DRUG TEST PRSMV CHEM ANLYZR: CPT | Performed by: PODIATRIST

## 2023-05-31 ENCOUNTER — OFFICE VISIT (OUTPATIENT)
Dept: PODIATRY | Facility: CLINIC | Age: 48
End: 2023-05-31
Payer: MEDICARE

## 2023-05-31 VITALS
BODY MASS INDEX: 23.71 KG/M2 | HEART RATE: 74 BPM | DIASTOLIC BLOOD PRESSURE: 81 MMHG | HEIGHT: 72 IN | WEIGHT: 175.06 LBS | SYSTOLIC BLOOD PRESSURE: 144 MMHG

## 2023-05-31 DIAGNOSIS — T84.84XA PAINFUL ORTHOPAEDIC HARDWARE: ICD-10-CM

## 2023-05-31 DIAGNOSIS — M35.7 HYPERMOBILE JOINT SYNDROME OF RIGHT FOOT: ICD-10-CM

## 2023-05-31 DIAGNOSIS — S93.311A CUBOID SYNDROME OF RIGHT FOOT: ICD-10-CM

## 2023-05-31 DIAGNOSIS — M79.671 FOOT PAIN, RIGHT: ICD-10-CM

## 2023-05-31 DIAGNOSIS — M79.2 NEURITIS: Primary | ICD-10-CM

## 2023-05-31 PROCEDURE — 99999 PR PBB SHADOW E&M-EST. PATIENT-LVL III: ICD-10-PCS | Mod: PBBFAC,,, | Performed by: PODIATRIST

## 2023-05-31 PROCEDURE — 99213 PR OFFICE/OUTPT VISIT, EST, LEVL III, 20-29 MIN: ICD-10-PCS | Mod: S$PBB,,, | Performed by: PODIATRIST

## 2023-05-31 PROCEDURE — 99213 OFFICE O/P EST LOW 20 MIN: CPT | Mod: PBBFAC | Performed by: PODIATRIST

## 2023-05-31 PROCEDURE — 99213 OFFICE O/P EST LOW 20 MIN: CPT | Mod: S$PBB,,, | Performed by: PODIATRIST

## 2023-05-31 PROCEDURE — 99999 PR PBB SHADOW E&M-EST. PATIENT-LVL III: CPT | Mod: PBBFAC,,, | Performed by: PODIATRIST

## 2023-05-31 RX ORDER — PREGABALIN 200 MG/1
200 CAPSULE ORAL 3 TIMES DAILY
Qty: 90 CAPSULE | Refills: 5 | Status: SHIPPED | OUTPATIENT
Start: 2023-05-31 | End: 2024-03-26 | Stop reason: SDUPTHER

## 2023-05-31 RX ORDER — DICLOFENAC SODIUM 75 MG/1
75 TABLET, DELAYED RELEASE ORAL 2 TIMES DAILY
Qty: 60 TABLET | Refills: 6 | Status: SHIPPED | OUTPATIENT
Start: 2023-05-31 | End: 2023-06-30

## 2023-05-31 RX ORDER — OXYCODONE AND ACETAMINOPHEN 10; 325 MG/1; MG/1
1 TABLET ORAL 3 TIMES DAILY
Qty: 90 TABLET | Refills: 0 | Status: SHIPPED | OUTPATIENT
Start: 2023-05-31 | End: 2023-06-28 | Stop reason: SDUPTHER

## 2023-06-02 NOTE — PROGRESS NOTES
Subjective:       Patient ID: Sg Sesay is a 47 y.o. male.    Chief Complaint: No chief complaint on file.  Patient presents today with complaint of pain on the right foot.  Patient also has a history of MS as well as chronic pain of both feet with associated neuritis.      Foot Pain  Associated symptoms include joint swelling.   Follow-up  Associated symptoms include joint swelling.   Medication Refill  Associated symptoms include joint swelling.   Toe Pain     Ankle Pain      Review of Systems   Musculoskeletal:  Positive for back pain and joint swelling.   All other systems reviewed and are negative.    Objective:      Physical Exam  Vitals and nursing note reviewed.   Constitutional:       Appearance: Normal appearance. He is well-developed.   Cardiovascular:      Rate and Rhythm: Normal rate and regular rhythm.      Pulses: Normal pulses.           Dorsalis pedis pulses are 2+ on the right side and 2+ on the left side.        Posterior tibial pulses are 2+ on the right side and 2+ on the left side.      Heart sounds: Normal heart sounds.   Pulmonary:      Effort: Pulmonary effort is normal.      Breath sounds: Normal breath sounds.   Musculoskeletal:         General: Swelling, tenderness and deformity present.      Right foot: Decreased range of motion. Deformity present.        Feet:    Feet:      Right foot:      Protective Sensation: 4 sites tested.  4 sites sensed.      Left foot:      Protective Sensation: 4 sites tested.  4 sites sensed.   Skin:     General: Skin is warm.      Capillary Refill: Capillary refill takes less than 2 seconds.   Neurological:      General: No focal deficit present.      Mental Status: He is alert.   Psychiatric:         Behavior: Behavior normal.         Thought Content: Thought content normal.         Judgment: Judgment normal.                                                                          Assessment:       1. Neuritis    2. Hypermobile joint syndrome of  right foot    3. Cuboid syndrome of right foot    4. Painful orthopaedic hardware    5. Foot pain, right          Plan:       Patient presents today with complaint of pain on the right foot.  Patient also has a history of MS as well as chronic pain of both feet with associated neuritis.  Patient states there is really been no change in his right foot he is considering surgery as I have previously discussed to remove the hardware and resect the base of the 5th metatarsal and a portion of the cuboid bone that has become very prominent causing discomfort on the plantar surface of the patient's right foot.  Patient does see me for pain management for chronic neuritis of the right foot.  Plan follow-up will be 1 month.  Patient will consider surgery as previously discussed.  Patient wears his arch supports at all times and states he can absolutely not walk without the arch supports.  Patient is going to consider surgery as discussed with him today and contact us with any problems questions or concerns.  Patient indicated today he is likely going to have to pursue the surgery that I have discussed to resect a portion of the base of the 5th metatarsal cuboid bone area removal of hardware that he has a lot of things going on right now with kids graduating from school certainly this is nothing that he has to pursue immediately but it is definitely something to consider and he will contact us when he is ready to pursue surgery as discussed follow-up 1 month.  Patient's drug screen is currently up-to-date.  This note was created using [employer] voice recognition software that occasionally misinterpreted phrases or words.

## 2023-06-28 ENCOUNTER — OFFICE VISIT (OUTPATIENT)
Dept: PODIATRY | Facility: CLINIC | Age: 48
End: 2023-06-28
Payer: MEDICARE

## 2023-06-28 VITALS
BODY MASS INDEX: 23.7 KG/M2 | WEIGHT: 175 LBS | HEIGHT: 72 IN | SYSTOLIC BLOOD PRESSURE: 135 MMHG | HEART RATE: 72 BPM | DIASTOLIC BLOOD PRESSURE: 92 MMHG

## 2023-06-28 DIAGNOSIS — T84.84XA PAINFUL ORTHOPAEDIC HARDWARE: Primary | ICD-10-CM

## 2023-06-28 DIAGNOSIS — S93.311A CUBOID SYNDROME OF RIGHT FOOT: ICD-10-CM

## 2023-06-28 DIAGNOSIS — M79.671 FOOT PAIN, RIGHT: ICD-10-CM

## 2023-06-28 DIAGNOSIS — M79.2 NEURITIS: ICD-10-CM

## 2023-06-28 PROCEDURE — 99213 OFFICE O/P EST LOW 20 MIN: CPT | Mod: S$PBB,,, | Performed by: PODIATRIST

## 2023-06-28 PROCEDURE — 99999 PR PBB SHADOW E&M-EST. PATIENT-LVL IV: CPT | Mod: PBBFAC,,, | Performed by: PODIATRIST

## 2023-06-28 PROCEDURE — 99999 PR PBB SHADOW E&M-EST. PATIENT-LVL IV: ICD-10-PCS | Mod: PBBFAC,,, | Performed by: PODIATRIST

## 2023-06-28 PROCEDURE — 99214 OFFICE O/P EST MOD 30 MIN: CPT | Mod: PBBFAC | Performed by: PODIATRIST

## 2023-06-28 PROCEDURE — 99213 PR OFFICE/OUTPT VISIT, EST, LEVL III, 20-29 MIN: ICD-10-PCS | Mod: S$PBB,,, | Performed by: PODIATRIST

## 2023-06-28 RX ORDER — OXYCODONE AND ACETAMINOPHEN 10; 325 MG/1; MG/1
1 TABLET ORAL 3 TIMES DAILY
Qty: 90 TABLET | Refills: 0 | Status: SHIPPED | OUTPATIENT
Start: 2023-06-28 | End: 2023-06-30 | Stop reason: RX

## 2023-06-30 ENCOUNTER — PATIENT MESSAGE (OUTPATIENT)
Dept: PODIATRY | Facility: CLINIC | Age: 48
End: 2023-06-30
Payer: MEDICARE

## 2023-06-30 ENCOUNTER — TELEPHONE (OUTPATIENT)
Dept: PODIATRY | Facility: CLINIC | Age: 48
End: 2023-06-30
Payer: MEDICARE

## 2023-06-30 RX ORDER — HYDROCODONE BITARTRATE AND ACETAMINOPHEN 10; 325 MG/1; MG/1
1 TABLET ORAL 3 TIMES DAILY
Qty: 90 TABLET | Refills: 0 | Status: SHIPPED | OUTPATIENT
Start: 2023-06-30 | End: 2023-07-26 | Stop reason: SDUPTHER

## 2023-06-30 NOTE — TELEPHONE ENCOUNTER
Patient will try to find medication at a different pharmacy or figure out an alternative and notify us once he has decided how to proceed.

## 2023-07-02 NOTE — PROGRESS NOTES
Subjective:       Patient ID: Sg Sesay is a 47 y.o. male.    Chief Complaint: Foot Pain and Follow-up  Patient presents today with complaint of pain on the right foot.  Patient also has a history of MS as well as chronic pain of both feet with associated neuritis.      Foot Pain  Associated symptoms include joint swelling.   Follow-up  Associated symptoms include joint swelling.   Medication Refill  Associated symptoms include joint swelling.   Toe Pain     Ankle Pain      Review of Systems   Musculoskeletal:  Positive for back pain and joint swelling.   All other systems reviewed and are negative.    Objective:      Physical Exam  Vitals and nursing note reviewed.   Constitutional:       Appearance: Normal appearance. He is well-developed.   Cardiovascular:      Rate and Rhythm: Normal rate and regular rhythm.      Pulses: Normal pulses.           Dorsalis pedis pulses are 2+ on the right side and 2+ on the left side.        Posterior tibial pulses are 2+ on the right side and 2+ on the left side.      Heart sounds: Normal heart sounds.   Pulmonary:      Effort: Pulmonary effort is normal.      Breath sounds: Normal breath sounds.   Musculoskeletal:         General: Swelling, tenderness and deformity present.      Right foot: Decreased range of motion. Deformity present.        Feet:    Feet:      Right foot:      Protective Sensation: 4 sites tested.  4 sites sensed.      Left foot:      Protective Sensation: 4 sites tested.  4 sites sensed.   Skin:     General: Skin is warm.      Capillary Refill: Capillary refill takes less than 2 seconds.   Neurological:      General: No focal deficit present.      Mental Status: He is alert.   Psychiatric:         Behavior: Behavior normal.         Thought Content: Thought content normal.         Judgment: Judgment normal.                        Assessment:       1. Painful orthopaedic hardware    2. Cuboid syndrome of right foot    3. Foot pain, right    4.  Neuritis          Plan:       Patient presents today with complaint of pain on the right foot.  Patient also has a history of MS as well as chronic pain of both feet with associated neuritis.  Patient states there is really been no change in his right foot he is considering surgery as I have previously discussed to remove the hardware and resect the base of the 5th metatarsal and a portion of the cuboid bone that has become very prominent causing discomfort on the plantar surface of the patient's right foot.  Patient does see me for pain management for chronic neuritis of the right foot.  Plan follow-up will be 1 month.  Patient will consider surgery as previously discussed.  Patient wears his arch supports at all times and states he can absolutely not walk without the arch supports.  Patient is going to consider surgery as discussed with him today and contact us with any problems questions or concerns.  Patient indicated today he is likely going to have to pursue the surgery that I have discussed to resect a portion of the base of the 5th metatarsal cuboid bone area removal of hardware.  Patient is currently under my care for pain management related to bilateral foot pain right greater than left that is being managed by pain medication and Lyrica.  Patient has a current pain management contract and up-to-date a drug screen on file.  Patient did state today with the extensive heat that we have had definitely makes him more fatigued with his MS.  Patient is not currently having any pain related to his plantar fasciitis bilateral.  Patient's drug screen is currently up-to-date.  This note was created using [employer] voice recognition software that occasionally misinterpreted phrases or words.

## 2023-07-03 ENCOUNTER — TELEPHONE (OUTPATIENT)
Dept: PODIATRY | Facility: CLINIC | Age: 48
End: 2023-07-03
Payer: MEDICARE

## 2023-07-03 NOTE — TELEPHONE ENCOUNTER
----- Message from Jah Torres DPM sent at 7/1/2023  7:06 PM CDT -----  Please call the patient and advise the new research that I had seen an article on regarding MS was a new genetic marker that has been identified which can help predict how fast MS progresses which does affect how they treat it.

## 2023-07-21 ENCOUNTER — PATIENT MESSAGE (OUTPATIENT)
Dept: PSYCHIATRY | Facility: CLINIC | Age: 48
End: 2023-07-21
Payer: MEDICARE

## 2023-07-26 ENCOUNTER — OFFICE VISIT (OUTPATIENT)
Dept: PODIATRY | Facility: CLINIC | Age: 48
End: 2023-07-26
Payer: MEDICARE

## 2023-07-26 VITALS
BODY MASS INDEX: 23.7 KG/M2 | HEIGHT: 72 IN | DIASTOLIC BLOOD PRESSURE: 96 MMHG | WEIGHT: 175 LBS | SYSTOLIC BLOOD PRESSURE: 136 MMHG | HEART RATE: 77 BPM

## 2023-07-26 DIAGNOSIS — M35.7 HYPERMOBILE JOINT SYNDROME OF RIGHT FOOT: ICD-10-CM

## 2023-07-26 DIAGNOSIS — M79.2 NEURITIS: Primary | ICD-10-CM

## 2023-07-26 PROCEDURE — 99999 PR PBB SHADOW E&M-EST. PATIENT-LVL IV: ICD-10-PCS | Mod: PBBFAC,,, | Performed by: PODIATRIST

## 2023-07-26 PROCEDURE — 99213 PR OFFICE/OUTPT VISIT, EST, LEVL III, 20-29 MIN: ICD-10-PCS | Mod: S$PBB,,, | Performed by: PODIATRIST

## 2023-07-26 PROCEDURE — 99999 PR PBB SHADOW E&M-EST. PATIENT-LVL IV: CPT | Mod: PBBFAC,,, | Performed by: PODIATRIST

## 2023-07-26 PROCEDURE — 99214 OFFICE O/P EST MOD 30 MIN: CPT | Mod: PBBFAC | Performed by: PODIATRIST

## 2023-07-26 PROCEDURE — 99213 OFFICE O/P EST LOW 20 MIN: CPT | Mod: S$PBB,,, | Performed by: PODIATRIST

## 2023-07-26 RX ORDER — HYDROCODONE BITARTRATE AND ACETAMINOPHEN 10; 325 MG/1; MG/1
1 TABLET ORAL 3 TIMES DAILY
Qty: 90 TABLET | Refills: 0 | Status: SHIPPED | OUTPATIENT
Start: 2023-07-26 | End: 2023-08-28 | Stop reason: SDUPTHER

## 2023-07-28 ENCOUNTER — LAB VISIT (OUTPATIENT)
Dept: LAB | Facility: HOSPITAL | Age: 48
End: 2023-07-28
Attending: PODIATRIST
Payer: MEDICARE

## 2023-07-28 ENCOUNTER — TELEPHONE (OUTPATIENT)
Dept: PODIATRY | Facility: CLINIC | Age: 48
End: 2023-07-28
Payer: MEDICARE

## 2023-07-28 DIAGNOSIS — M79.2 NEURITIS: ICD-10-CM

## 2023-07-28 LAB
AMPHET+METHAMPHET UR QL: NEGATIVE
BARBITURATES UR QL SCN>200 NG/ML: NEGATIVE
BENZODIAZ UR QL SCN>200 NG/ML: NEGATIVE
BZE UR QL SCN: NEGATIVE
CANNABINOIDS UR QL SCN: NEGATIVE
CREAT UR-MCNC: 316.2 MG/DL (ref 23–375)
METHADONE UR QL SCN>300 NG/ML: NEGATIVE
OPIATES UR QL SCN: ABNORMAL
PCP UR QL SCN>25 NG/ML: NEGATIVE
TOXICOLOGY INFORMATION: ABNORMAL

## 2023-07-28 PROCEDURE — 80307 DRUG TEST PRSMV CHEM ANLYZR: CPT | Performed by: PODIATRIST

## 2023-07-28 NOTE — TELEPHONE ENCOUNTER
----- Message from Jah Torres DPM sent at 7/28/2023 12:31 PM CDT -----  Please advised the patient his drug screen was fine.  ----- Message -----  From: [employer] [facility]  Sent: 7/28/2023  10:59 AM CDT  To: Jah Torres DPM

## 2023-07-29 NOTE — PROGRESS NOTES
Subjective:       Patient ID: Sg Sesay is a 47 y.o. male.    Chief Complaint: Follow-up and Foot Pain  Patient presents today with complaint of pain on the right foot.  Patient also has a history of MS as well as chronic pain of both feet with associated neuritis.      Foot Pain  Associated symptoms include joint swelling.   Follow-up  Associated symptoms include joint swelling.   Medication Refill  Associated symptoms include joint swelling.   Toe Pain     Ankle Pain      Review of Systems   Musculoskeletal:  Positive for back pain and joint swelling.   All other systems reviewed and are negative.    Objective:      Physical Exam  Vitals and nursing note reviewed.   Constitutional:       Appearance: Normal appearance. He is well-developed.   Cardiovascular:      Rate and Rhythm: Normal rate and regular rhythm.      Pulses: Normal pulses.           Dorsalis pedis pulses are 2+ on the right side and 2+ on the left side.        Posterior tibial pulses are 2+ on the right side and 2+ on the left side.      Heart sounds: Normal heart sounds.   Pulmonary:      Effort: Pulmonary effort is normal.      Breath sounds: Normal breath sounds.   Musculoskeletal:         General: Swelling, tenderness and deformity present.      Right foot: Decreased range of motion. Deformity present.        Feet:    Feet:      Right foot:      Protective Sensation: 4 sites tested.  4 sites sensed.      Left foot:      Protective Sensation: 4 sites tested.  4 sites sensed.   Skin:     General: Skin is warm.      Capillary Refill: Capillary refill takes less than 2 seconds.   Neurological:      General: No focal deficit present.      Mental Status: He is alert.   Psychiatric:         Behavior: Behavior normal.         Thought Content: Thought content normal.         Judgment: Judgment normal.                                  Assessment:       1. Neuritis    2. Hypermobile joint syndrome of right foot          Plan:       Patient  presents today with complaint of pain on the right foot.  Patient also has a history of MS as well as chronic pain of both feet with associated neuritis.  Patient states there is really been no change in his right foot he is considering surgery as I have previously discussed to remove the hardware and resect the base of the 5th metatarsal and a portion of the cuboid bone that has become very prominent causing discomfort on the plantar surface of the patient's right foot.  Patient does see me for pain management for chronic neuritis of the right foot.  Plan follow-up will be 1 month.  Patient will consider surgery as previously discussed.  Patient wears his arch supports at all times and states he can absolutely not walk without the arch supports.  Patient is going to consider surgery as discussed with him today and contact us with any problems questions or concerns.  Patient indicated today he is likely going to have to pursue the surgery that I have discussed to resect a portion of the base of the 5th metatarsal cuboid bone area removal of hardware.  Patient is currently under my care for pain management related to bilateral foot pain right greater than left that is being managed by pain medication and Lyrica.  Patient has a current pain management contract and up-to-date a drug screen on file.  Patient did state today with the extensive heat that we have had definitely makes him more fatigued with his MS.  Patient is not currently having any pain related to his plantar fasciitis bilateral.  A new pain management contract was executed today this is done on an annual basis patient will need a drug screen prior to his next evaluation.  This note was created using [employer] voice recognition software that occasionally misinterpreted phrases or words.

## 2023-08-28 ENCOUNTER — TELEPHONE (OUTPATIENT)
Dept: PODIATRY | Facility: CLINIC | Age: 48
End: 2023-08-28
Payer: MEDICARE

## 2023-08-28 ENCOUNTER — OFFICE VISIT (OUTPATIENT)
Dept: PODIATRY | Facility: CLINIC | Age: 48
End: 2023-08-28
Payer: MEDICARE

## 2023-08-28 ENCOUNTER — LAB VISIT (OUTPATIENT)
Dept: LAB | Facility: HOSPITAL | Age: 48
End: 2023-08-28
Attending: PODIATRIST
Payer: MEDICARE

## 2023-08-28 VITALS
WEIGHT: 175 LBS | SYSTOLIC BLOOD PRESSURE: 120 MMHG | HEIGHT: 72 IN | BODY MASS INDEX: 23.7 KG/M2 | HEART RATE: 66 BPM | DIASTOLIC BLOOD PRESSURE: 78 MMHG

## 2023-08-28 DIAGNOSIS — T84.84XA PAINFUL ORTHOPAEDIC HARDWARE: ICD-10-CM

## 2023-08-28 DIAGNOSIS — G35 MS (MULTIPLE SCLEROSIS): ICD-10-CM

## 2023-08-28 DIAGNOSIS — S93.311A CUBOID SYNDROME OF RIGHT FOOT: ICD-10-CM

## 2023-08-28 DIAGNOSIS — M76.70 PERONEAL TENDONITIS, UNSPECIFIED LATERALITY: ICD-10-CM

## 2023-08-28 DIAGNOSIS — M79.2 NEURITIS: Primary | ICD-10-CM

## 2023-08-28 DIAGNOSIS — M79.2 NEURITIS: ICD-10-CM

## 2023-08-28 DIAGNOSIS — M25.571 PAIN, JOINT, FOOT, RIGHT: ICD-10-CM

## 2023-08-28 LAB
AMPHET+METHAMPHET UR QL: NEGATIVE
BARBITURATES UR QL SCN>200 NG/ML: NEGATIVE
BENZODIAZ UR QL SCN>200 NG/ML: NEGATIVE
BZE UR QL SCN: NEGATIVE
CANNABINOIDS UR QL SCN: NEGATIVE
CREAT UR-MCNC: 393.5 MG/DL (ref 23–375)
METHADONE UR QL SCN>300 NG/ML: NEGATIVE
OPIATES UR QL SCN: ABNORMAL
PCP UR QL SCN>25 NG/ML: NEGATIVE
TOXICOLOGY INFORMATION: ABNORMAL

## 2023-08-28 PROCEDURE — 99213 PR OFFICE/OUTPT VISIT, EST, LEVL III, 20-29 MIN: ICD-10-PCS | Mod: S$PBB,,, | Performed by: PODIATRIST

## 2023-08-28 PROCEDURE — 99999 PR PBB SHADOW E&M-EST. PATIENT-LVL III: CPT | Mod: PBBFAC,,, | Performed by: PODIATRIST

## 2023-08-28 PROCEDURE — 99213 OFFICE O/P EST LOW 20 MIN: CPT | Mod: PBBFAC | Performed by: PODIATRIST

## 2023-08-28 PROCEDURE — 99999 PR PBB SHADOW E&M-EST. PATIENT-LVL III: ICD-10-PCS | Mod: PBBFAC,,, | Performed by: PODIATRIST

## 2023-08-28 PROCEDURE — 99213 OFFICE O/P EST LOW 20 MIN: CPT | Mod: S$PBB,,, | Performed by: PODIATRIST

## 2023-08-28 PROCEDURE — 80307 DRUG TEST PRSMV CHEM ANLYZR: CPT | Performed by: PODIATRIST

## 2023-08-28 RX ORDER — HYDROCODONE BITARTRATE AND ACETAMINOPHEN 10; 325 MG/1; MG/1
1 TABLET ORAL 3 TIMES DAILY
Qty: 90 TABLET | Refills: 0 | Status: SHIPPED | OUTPATIENT
Start: 2023-08-28 | End: 2023-09-25

## 2023-08-28 NOTE — TELEPHONE ENCOUNTER
----- Message from Jah Torres DPM sent at 8/28/2023 11:51 AM CDT -----  Please call the patient and advise his drug screen is fine.  ----- Message -----  From: [facility] [employer] [facility]  Sent: 8/28/2023  10:43 AM CDT  To: Jah Torres DPM

## 2023-08-29 NOTE — PROGRESS NOTES
Subjective:       Patient ID: Sg Sesay is a 47 y.o. male.    Chief Complaint: Follow-up, Foot Pain, and Foot Problem  Patient presents today with complaint of pain on the right foot.  Patient also has a history of MS as well as chronic pain of both feet with associated neuritis.      Foot Pain  Associated symptoms include joint swelling.   Follow-up  Associated symptoms include joint swelling.   Medication Refill  Associated symptoms include joint swelling.   Toe Pain     Ankle Pain        Review of Systems   Musculoskeletal:  Positive for back pain and joint swelling.   All other systems reviewed and are negative.      Objective:      Physical Exam  Vitals and nursing note reviewed.   Constitutional:       Appearance: Normal appearance. He is well-developed.   Cardiovascular:      Rate and Rhythm: Normal rate and regular rhythm.      Pulses: Normal pulses.           Dorsalis pedis pulses are 2+ on the right side and 2+ on the left side.        Posterior tibial pulses are 2+ on the right side and 2+ on the left side.      Heart sounds: Normal heart sounds.   Pulmonary:      Effort: Pulmonary effort is normal.      Breath sounds: Normal breath sounds.   Musculoskeletal:         General: Swelling, tenderness and deformity present.      Right foot: Decreased range of motion. Deformity present.        Feet:    Feet:      Right foot:      Protective Sensation: 4 sites tested.  4 sites sensed.      Left foot:      Protective Sensation: 4 sites tested.  4 sites sensed.   Skin:     General: Skin is warm.      Capillary Refill: Capillary refill takes less than 2 seconds.   Neurological:      General: No focal deficit present.      Mental Status: He is alert.   Psychiatric:         Behavior: Behavior normal.         Thought Content: Thought content normal.         Judgment: Judgment normal.              Assessment:       1. Neuritis    2. MS (multiple sclerosis)    3. Painful orthopaedic hardware    4. Cuboid  syndrome of right foot    5. Pain, joint, foot, right    6. Peroneal tendonitis, unspecified laterality          Plan:       Patient presents today with complaint of pain on the right foot.  Patient also has a history of MS as well as chronic pain of both feet with associated neuritis.  Patient states there is really been no change in his right foot he is considering surgery as I have previously discussed to remove the hardware and resect the base of the 5th metatarsal and a portion of the cuboid bone that has become very prominent causing discomfort on the plantar surface of the patient's right foot.  Patient does see me for pain management for chronic neuritis of the right foot.  Plan follow-up will be 1 month.  Patient will consider surgery as previously discussed.  Patient wears his arch supports at all times and states he can absolutely not walk without the arch supports.  Patient is going to consider surgery as discussed with him today and contact us with any problems questions or concerns.  Patient indicated today he is likely going to have to pursue the surgery that I have discussed to resect a portion of the base of the 5th metatarsal cuboid bone area removal of hardware.  Patient is currently under my care for pain management related to bilateral foot pain right greater than left that is being managed by pain medication and Lyrica.  Patient has a current pain management contract and up-to-date a drug screen on file.  Patient will need a new drug screen prior to next month visit.  Patient did state today with the extensive heat that we have had definitely makes him more fatigued with his MS.  Patient is not currently having any pain related to his plantar fasciitis bilateral.  A new pain management contract was executed today this is done on an annual basis patient will need a drug screen prior to his next evaluation.  Patient did relate today that it both of his feet feel as if he is walking on pillows I  have advised the patient this may be related to his MS.  Patient in did indicate he had a previous brain scan which did not show any advancement of his MS.  This note was created using [employer] voice recognition software that occasionally misinterpreted phrases or words.

## 2023-09-25 ENCOUNTER — HOSPITAL ENCOUNTER (OUTPATIENT)
Dept: RADIOLOGY | Facility: HOSPITAL | Age: 48
Discharge: HOME | End: 2023-09-25
Attending: PODIATRIST
Payer: MEDICARE

## 2023-09-25 ENCOUNTER — OFFICE VISIT (OUTPATIENT)
Dept: PODIATRY | Facility: CLINIC | Age: 48
End: 2023-09-25
Payer: MEDICARE

## 2023-09-25 VITALS
BODY MASS INDEX: 25.47 KG/M2 | HEIGHT: 72 IN | HEART RATE: 68 BPM | WEIGHT: 188 LBS | SYSTOLIC BLOOD PRESSURE: 149 MMHG | DIASTOLIC BLOOD PRESSURE: 75 MMHG

## 2023-09-25 DIAGNOSIS — M79.2 NEURITIS: ICD-10-CM

## 2023-09-25 DIAGNOSIS — S93.311A CUBOID SYNDROME OF RIGHT FOOT: ICD-10-CM

## 2023-09-25 DIAGNOSIS — T84.84XA PAINFUL ORTHOPAEDIC HARDWARE: ICD-10-CM

## 2023-09-25 DIAGNOSIS — M35.7 HYPERMOBILE JOINT SYNDROME OF RIGHT FOOT: Primary | ICD-10-CM

## 2023-09-25 DIAGNOSIS — G35 MS (MULTIPLE SCLEROSIS): ICD-10-CM

## 2023-09-25 PROCEDURE — 99999 PR PBB SHADOW E&M-EST. PATIENT-LVL IV: CPT | Mod: PBBFAC,,, | Performed by: PODIATRIST

## 2023-09-25 PROCEDURE — 99213 PR OFFICE/OUTPT VISIT, EST, LEVL III, 20-29 MIN: ICD-10-PCS | Mod: S$PBB,,, | Performed by: PODIATRIST

## 2023-09-25 PROCEDURE — 99999 PR PBB SHADOW E&M-EST. PATIENT-LVL IV: ICD-10-PCS | Mod: PBBFAC,,, | Performed by: PODIATRIST

## 2023-09-25 PROCEDURE — 73630 X-RAY EXAM OF FOOT: CPT | Mod: 26,RT,, | Performed by: RADIOLOGY

## 2023-09-25 PROCEDURE — 73630 X-RAY EXAM OF FOOT: CPT | Mod: TC,RT

## 2023-09-25 PROCEDURE — 73630 XR FOOT COMPLETE 3 VIEW RIGHT: ICD-10-PCS | Mod: 26,RT,, | Performed by: RADIOLOGY

## 2023-09-25 PROCEDURE — 99214 OFFICE O/P EST MOD 30 MIN: CPT | Mod: PBBFAC | Performed by: PODIATRIST

## 2023-09-25 PROCEDURE — 99213 OFFICE O/P EST LOW 20 MIN: CPT | Mod: S$PBB,,, | Performed by: PODIATRIST

## 2023-09-25 RX ORDER — OXYCODONE AND ACETAMINOPHEN 10; 325 MG/1; MG/1
1 TABLET ORAL 3 TIMES DAILY
COMMUNITY
Start: 2023-09-02 | End: 2023-09-25 | Stop reason: SDUPTHER

## 2023-09-25 RX ORDER — DICLOFENAC SODIUM 75 MG/1
75 TABLET, DELAYED RELEASE ORAL 2 TIMES DAILY
Status: ON HOLD | COMMUNITY
Start: 2023-08-31 | End: 2024-02-02 | Stop reason: CLARIF

## 2023-09-25 RX ORDER — OXYCODONE AND ACETAMINOPHEN 10; 325 MG/1; MG/1
1 TABLET ORAL 3 TIMES DAILY
Qty: 90 TABLET | Refills: 0 | Status: SHIPPED | OUTPATIENT
Start: 2023-09-25 | End: 2023-10-25 | Stop reason: SDUPTHER

## 2023-09-25 RX ORDER — BACLOFEN 20 MG/1
10 TABLET ORAL
COMMUNITY
Start: 2023-08-09

## 2023-09-25 RX ORDER — BUSPIRONE HYDROCHLORIDE 10 MG/1
20 TABLET ORAL
COMMUNITY
Start: 2023-09-15

## 2023-09-25 RX ORDER — DIMETHYL FUMARATE 120 MG/1
CAPSULE ORAL
COMMUNITY
End: 2023-09-25

## 2023-09-26 NOTE — PROGRESS NOTES
Subjective:       Patient ID: Sg Sesay is a 47 y.o. male.    Chief Complaint: Follow-up and Foot Pain  Patient presents today with complaint of pain on the right foot.  Patient also has a history of MS as well as chronic pain of both feet with associated neuritis.      Foot Pain  Associated symptoms include joint swelling.   Follow-up  Associated symptoms include joint swelling.   Medication Refill  Associated symptoms include joint swelling.   Toe Pain     Ankle Pain        Review of Systems   Musculoskeletal:  Positive for back pain and joint swelling.   All other systems reviewed and are negative.      Objective:      Physical Exam  Vitals and nursing note reviewed.   Constitutional:       Appearance: Normal appearance. He is well-developed.   Cardiovascular:      Rate and Rhythm: Normal rate and regular rhythm.      Pulses: Normal pulses.           Dorsalis pedis pulses are 2+ on the right side and 2+ on the left side.        Posterior tibial pulses are 2+ on the right side and 2+ on the left side.      Heart sounds: Normal heart sounds.   Pulmonary:      Effort: Pulmonary effort is normal.      Breath sounds: Normal breath sounds.   Musculoskeletal:         General: Swelling, tenderness and deformity present.      Right foot: Decreased range of motion. Deformity present.        Feet:    Feet:      Right foot:      Protective Sensation: 4 sites tested.  4 sites sensed.      Left foot:      Protective Sensation: 4 sites tested.  4 sites sensed.   Skin:     General: Skin is warm.      Capillary Refill: Capillary refill takes less than 2 seconds.   Neurological:      General: No focal deficit present.      Mental Status: He is alert.   Psychiatric:         Behavior: Behavior normal.         Thought Content: Thought content normal.         Judgment: Judgment normal.              Assessment:       1. Hypermobile joint syndrome of right foot    2. Painful orthopaedic hardware    3. Cuboid syndrome of right  foot    4. Neuritis    5. MS (multiple sclerosis)          Plan:       Patient presents today with complaint of pain on the right foot.  Patient also has a history of MS as well as chronic pain of both feet with associated neuritis.  Patient states there is really been no change in his right foot he is considering surgery as I have previously discussed to remove the hardware and resect the base of the 5th metatarsal and a portion of the cuboid bone that has become very prominent causing discomfort on the plantar surface of the patient's right foot.  Patient does see me for pain management for chronic neuritis of the right foot.  Plan follow-up will be 1 month.  Patient will consider surgery as previously discussed.  Patient wears his arch supports at all times and states he can absolutely not walk without the arch supports.  Patient is going to consider surgery as discussed with him today and contact us with any problems questions or concerns.  Patient indicated today he is likely going to have to pursue the surgery that I have discussed to resect a portion of the base of the 5th metatarsal cuboid bone area removal of hardware.  Patient is currently under my care for pain management related to bilateral foot pain right greater than left that is being managed by pain medication and Lyrica.  Patient has a current pain management contract and up-to-date a drug screen on file.    Patient did state today with the extensive heat that we have had definitely makes him more fatigued with his MS.  Patient is not currently having any pain related to his plantar fasciitis bilateral.  A new pain management contract was executed today this is done on an annual basis patient will need a drug screen prior to his next evaluation.  Patient did relate today that it both of his feet feel as if he is walking on pillows I have advised the patient this may be related to his MS.  X-rays reviewed today no significant changes noted.  This  note was created using [employer] voice recognition software that occasionally misinterpreted phrases or words.

## 2023-10-25 ENCOUNTER — OFFICE VISIT (OUTPATIENT)
Dept: PODIATRY | Facility: CLINIC | Age: 48
End: 2023-10-25
Payer: MEDICARE

## 2023-10-25 VITALS
BODY MASS INDEX: 26.41 KG/M2 | WEIGHT: 195 LBS | SYSTOLIC BLOOD PRESSURE: 126 MMHG | DIASTOLIC BLOOD PRESSURE: 83 MMHG | HEART RATE: 86 BPM | HEIGHT: 72 IN

## 2023-10-25 DIAGNOSIS — S93.311A CUBOID SYNDROME OF RIGHT FOOT: ICD-10-CM

## 2023-10-25 DIAGNOSIS — G35 MS (MULTIPLE SCLEROSIS): ICD-10-CM

## 2023-10-25 DIAGNOSIS — M25.571 PAIN, JOINT, FOOT, RIGHT: ICD-10-CM

## 2023-10-25 DIAGNOSIS — T84.84XA PAINFUL ORTHOPAEDIC HARDWARE: ICD-10-CM

## 2023-10-25 DIAGNOSIS — M79.2 NEURITIS: Primary | ICD-10-CM

## 2023-10-25 PROCEDURE — 99999 PR PBB SHADOW E&M-EST. PATIENT-LVL IV: CPT | Mod: PBBFAC,,, | Performed by: PODIATRIST

## 2023-10-25 PROCEDURE — 99214 OFFICE O/P EST MOD 30 MIN: CPT | Mod: PBBFAC | Performed by: PODIATRIST

## 2023-10-25 PROCEDURE — 99213 OFFICE O/P EST LOW 20 MIN: CPT | Mod: S$PBB,,, | Performed by: PODIATRIST

## 2023-10-25 PROCEDURE — 99999 PR PBB SHADOW E&M-EST. PATIENT-LVL IV: ICD-10-PCS | Mod: PBBFAC,,, | Performed by: PODIATRIST

## 2023-10-25 PROCEDURE — 99213 PR OFFICE/OUTPT VISIT, EST, LEVL III, 20-29 MIN: ICD-10-PCS | Mod: S$PBB,,, | Performed by: PODIATRIST

## 2023-10-25 RX ORDER — OXYCODONE AND ACETAMINOPHEN 10; 325 MG/1; MG/1
1 TABLET ORAL 3 TIMES DAILY
Qty: 90 TABLET | Refills: 0 | Status: SHIPPED | OUTPATIENT
Start: 2023-10-25 | End: 2023-11-22 | Stop reason: SDUPTHER

## 2023-10-27 NOTE — PROGRESS NOTES
Subjective:       Patient ID: Sg Sesay is a 47 y.o. male.    Chief Complaint: Follow-up and Foot Pain  Patient presents today with complaint of pain on the right foot.  Patient also has a history of MS as well as chronic pain of both feet with associated neuritis.      Foot Pain  Associated symptoms include joint swelling.   Follow-up  Associated symptoms include joint swelling.   Medication Refill  Associated symptoms include joint swelling.   Toe Pain     Ankle Pain        Review of Systems   Musculoskeletal:  Positive for back pain and joint swelling.   All other systems reviewed and are negative.      Objective:      Physical Exam  Vitals and nursing note reviewed.   Constitutional:       Appearance: Normal appearance. He is well-developed.   Cardiovascular:      Rate and Rhythm: Normal rate and regular rhythm.      Pulses: Normal pulses.           Dorsalis pedis pulses are 2+ on the right side and 2+ on the left side.        Posterior tibial pulses are 2+ on the right side and 2+ on the left side.      Heart sounds: Normal heart sounds.   Pulmonary:      Effort: Pulmonary effort is normal.      Breath sounds: Normal breath sounds.   Musculoskeletal:         General: Swelling, tenderness and deformity present.      Right foot: Decreased range of motion. Deformity present.        Feet:    Feet:      Right foot:      Protective Sensation: 4 sites tested.  4 sites sensed.      Left foot:      Protective Sensation: 4 sites tested.  4 sites sensed.   Skin:     General: Skin is warm.      Capillary Refill: Capillary refill takes less than 2 seconds.   Neurological:      General: No focal deficit present.      Mental Status: He is alert.   Psychiatric:         Behavior: Behavior normal.         Thought Content: Thought content normal.         Judgment: Judgment normal.                            Assessment:       1. Neuritis    2. Cuboid syndrome of right foot    3. Pain, joint, foot, right    4. MS  (multiple sclerosis)    5. Painful orthopaedic hardware          Plan:       Patient presents today with complaint of pain on the right foot.  Patient also has a history of MS as well as chronic pain of both feet with associated neuritis.  Patient states there is really been no change in his right foot he is considering surgery as I have previously discussed to remove the hardware and resect the base of the 5th metatarsal and a portion of the cuboid bone that has become very prominent causing discomfort on the plantar surface of the patient's right foot.  Patient does see me for pain management for chronic neuritis of the right foot.  Plan follow-up will be 1 month.  Patient will consider surgery as previously discussed.  Patient wears his arch supports at all times and states he can absolutely not walk without the arch supports.  Patient is going to consider surgery as discussed with him today and contact us with any problems questions or concerns.  Patient indicated today he is likely going to have to pursue the surgery that I have discussed to resect a portion of the base of the 5th metatarsal cuboid bone area removal of hardware.  Patient is currently under my care for pain management related to bilateral foot pain right greater than left that is being managed by pain medication and Lyrica.  Patient has a current pain management contract and up-to-date a drug screen on file.    Patient will need a drug screen prior to next month visit.  Surgery again discussed with the patient he states he knows he is going to need to have something done and is going to have to make a decision soon because this is not getting any better and can only improve with additional surgical intervention the patient is aware and understands this.  This note was created using [employer] voice recognition software that occasionally misinterpreted phrases or words.

## 2023-11-01 ENCOUNTER — TELEPHONE (OUTPATIENT)
Dept: PODIATRY | Facility: CLINIC | Age: 48
End: 2023-11-01

## 2023-11-01 ENCOUNTER — LAB VISIT (OUTPATIENT)
Dept: LAB | Facility: HOSPITAL | Age: 48
End: 2023-11-01
Attending: PODIATRIST
Payer: MEDICARE

## 2023-11-01 DIAGNOSIS — M79.2 NEURITIS: ICD-10-CM

## 2023-11-01 PROCEDURE — 80307 DRUG TEST PRSMV CHEM ANLYZR: CPT | Performed by: PODIATRIST

## 2023-11-22 ENCOUNTER — HOSPITAL ENCOUNTER (OUTPATIENT)
Dept: RADIOLOGY | Facility: HOSPITAL | Age: 48
Discharge: HOME | End: 2023-11-22
Attending: PODIATRIST
Payer: MEDICARE

## 2023-11-22 ENCOUNTER — OFFICE VISIT (OUTPATIENT)
Dept: PODIATRY | Facility: CLINIC | Age: 48
End: 2023-11-22
Payer: MEDICARE

## 2023-11-22 VITALS — HEIGHT: 72 IN | WEIGHT: 195 LBS | BODY MASS INDEX: 26.41 KG/M2

## 2023-11-22 DIAGNOSIS — G35 MS (MULTIPLE SCLEROSIS): ICD-10-CM

## 2023-11-22 DIAGNOSIS — S99.922A INJURY OF LEFT FOOT, INITIAL ENCOUNTER: ICD-10-CM

## 2023-11-22 DIAGNOSIS — M79.2 NEURITIS: ICD-10-CM

## 2023-11-22 DIAGNOSIS — S93.311A CUBOID SYNDROME OF RIGHT FOOT: ICD-10-CM

## 2023-11-22 DIAGNOSIS — S93.311A CUBOID SYNDROME OF RIGHT FOOT: Primary | ICD-10-CM

## 2023-11-22 DIAGNOSIS — T84.84XA PAINFUL ORTHOPAEDIC HARDWARE: ICD-10-CM

## 2023-11-22 DIAGNOSIS — M25.571 PAIN, JOINT, FOOT, RIGHT: ICD-10-CM

## 2023-11-22 PROBLEM — S92.514A CLOSED NONDISPLACED FRACTURE OF PROXIMAL PHALANX OF LESSER TOE OF RIGHT FOOT: Status: RESOLVED | Noted: 2021-10-22 | Resolved: 2023-11-22

## 2023-11-22 PROCEDURE — 73630 X-RAY EXAM OF FOOT: CPT | Mod: 26,RT,, | Performed by: RADIOLOGY

## 2023-11-22 PROCEDURE — 99999 PR PBB SHADOW E&M-EST. PATIENT-LVL IV: CPT | Mod: PBBFAC,,, | Performed by: PODIATRIST

## 2023-11-22 PROCEDURE — 73630 XR FOOT COMPLETE 3 VIEW RIGHT: ICD-10-PCS | Mod: 26,RT,, | Performed by: RADIOLOGY

## 2023-11-22 PROCEDURE — 73630 X-RAY EXAM OF FOOT: CPT | Mod: 26,LT,, | Performed by: RADIOLOGY

## 2023-11-22 PROCEDURE — 99214 OFFICE O/P EST MOD 30 MIN: CPT | Mod: S$PBB,,, | Performed by: PODIATRIST

## 2023-11-22 PROCEDURE — 73630 X-RAY EXAM OF FOOT: CPT | Mod: TC,LT

## 2023-11-22 PROCEDURE — 99214 OFFICE O/P EST MOD 30 MIN: CPT | Mod: PBBFAC | Performed by: PODIATRIST

## 2023-11-22 PROCEDURE — 99214 PR OFFICE/OUTPT VISIT, EST, LEVL IV, 30-39 MIN: ICD-10-PCS | Mod: S$PBB,,, | Performed by: PODIATRIST

## 2023-11-22 PROCEDURE — 99999 PR PBB SHADOW E&M-EST. PATIENT-LVL IV: ICD-10-PCS | Mod: PBBFAC,,, | Performed by: PODIATRIST

## 2023-11-22 PROCEDURE — 73630 X-RAY EXAM OF FOOT: CPT | Mod: TC,RT

## 2023-11-22 RX ORDER — OXYCODONE AND ACETAMINOPHEN 10; 325 MG/1; MG/1
1 TABLET ORAL 3 TIMES DAILY
Qty: 90 TABLET | Refills: 0 | Status: SHIPPED | OUTPATIENT
Start: 2023-11-22 | End: 2023-11-29 | Stop reason: RX

## 2023-11-24 NOTE — PROGRESS NOTES
Subjective:       Patient ID: Sg Sesay is a 47 y.o. male.    Chief Complaint: Foot Injury and Follow-up  Patient presents today with complaint of pain on the right foot.  Patient also has a history of MS as well as chronic pain of both feet with associated neuritis.      Foot Pain  Associated symptoms include joint swelling.   Follow-up  Associated symptoms include joint swelling.   Medication Refill  Associated symptoms include joint swelling.   Toe Pain     Ankle Pain     Foot Injury        Review of Systems   Musculoskeletal:  Positive for back pain and joint swelling.   All other systems reviewed and are negative.      Objective:      Physical Exam  Vitals and nursing note reviewed.   Constitutional:       Appearance: Normal appearance. He is well-developed.   Cardiovascular:      Rate and Rhythm: Normal rate and regular rhythm.      Pulses: Normal pulses.           Dorsalis pedis pulses are 2+ on the right side and 2+ on the left side.        Posterior tibial pulses are 2+ on the right side and 2+ on the left side.      Heart sounds: Normal heart sounds.   Pulmonary:      Effort: Pulmonary effort is normal.      Breath sounds: Normal breath sounds.   Musculoskeletal:         General: Swelling, tenderness and deformity present.      Right foot: Decreased range of motion. Deformity present.        Feet:    Feet:      Right foot:      Protective Sensation: 4 sites tested.  4 sites sensed.      Left foot:      Protective Sensation: 4 sites tested.  4 sites sensed.   Skin:     General: Skin is warm.      Capillary Refill: Capillary refill takes less than 2 seconds.   Neurological:      General: No focal deficit present.      Mental Status: He is alert.   Psychiatric:         Behavior: Behavior normal.         Thought Content: Thought content normal.         Judgment: Judgment normal.                                          Assessment:       1. Cuboid syndrome of right foot    2. Painful orthopaedic  hardware    3. Pain, joint, foot, right    4. Injury of left foot, initial encounter    5. Neuritis    6. MS (multiple sclerosis)          Plan:       Patient presents today with complaint of pain on the right foot.  Patient also has a history of MS as well as chronic pain of both feet with associated neuritis.  Patient states there is really been no change in his right foot he is considering surgery as I have previously discussed to remove the hardware and resect the base of the 5th metatarsal and a portion of the cuboid bone that has become very prominent causing discomfort on the plantar surface of the patient's right foot.  Patient does see me for pain management for chronic neuritis of the right foot.  Plan follow-up will be 1 month.  Patient will consider surgery as previously discussed.  Patient wears his arch supports at all times and states he can absolutely not walk without the arch supports.  Patient is going to consider surgery as discussed with him today and contact us with any problems questions or concerns.  Patient indicated today he is likely going to have to pursue the surgery that I have discussed to resect a portion of the base of the 5th metatarsal cuboid bone area removal of hardware.  Patient is currently under my care for pain management related to bilateral foot pain right greater than left that is being managed by pain medication and Lyrica.  Patient has a current pain management contract and up-to-date a drug screen on file.    Surgery again discussed with the patient he states he knows he is going to need to have something done and is going to have to make a decision soon because this is not getting any better and can only improve with additional surgical intervention the patient is aware and understands this.  Patient had also indicated that he pulled something on the bottom of his left foot recently he is had pain and discomfort this is along the course of the plantar fascial ligament  plain film x-rays were evaluated bilateral today there are no fracture no signs of dislocation on the left foot although the patient does have degenerative spurring in and around the cuboid area.  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  This note was created using [employer] voice recognition software that occasionally misinterpreted phrases or words.

## 2023-11-29 ENCOUNTER — TELEPHONE (OUTPATIENT)
Dept: PODIATRY | Facility: CLINIC | Age: 48
End: 2023-11-29

## 2023-11-29 RX ORDER — OXYCODONE AND ACETAMINOPHEN 7.5; 325 MG/1; MG/1
1 TABLET ORAL 3 TIMES DAILY
Qty: 90 TABLET | Refills: 0 | Status: SHIPPED | OUTPATIENT
Start: 2023-11-29 | End: 2023-12-27 | Stop reason: SDUPTHER

## 2023-11-29 NOTE — TELEPHONE ENCOUNTER
LVM requesting patient call back to verify what pain medication he would prefer due to shortage of his regularly prescribed medication.

## 2023-11-29 NOTE — TELEPHONE ENCOUNTER
[facility] out of percocet 10 but they have 7.5 in stock. Patient is aware and requesting lower dosage prescription be sent to pharmacy.

## 2023-12-27 ENCOUNTER — OFFICE VISIT (OUTPATIENT)
Dept: PODIATRY | Facility: CLINIC | Age: 48
End: 2023-12-27
Payer: MEDICARE

## 2023-12-27 VITALS
WEIGHT: 195 LBS | HEART RATE: 66 BPM | HEIGHT: 72 IN | BODY MASS INDEX: 26.41 KG/M2 | SYSTOLIC BLOOD PRESSURE: 155 MMHG | DIASTOLIC BLOOD PRESSURE: 93 MMHG

## 2023-12-27 DIAGNOSIS — G35 MS (MULTIPLE SCLEROSIS): ICD-10-CM

## 2023-12-27 DIAGNOSIS — Z02.89 PAIN MANAGEMENT CONTRACT AGREEMENT: ICD-10-CM

## 2023-12-27 DIAGNOSIS — M79.2 NEURITIS: Primary | ICD-10-CM

## 2023-12-27 DIAGNOSIS — T84.84XA PAINFUL ORTHOPAEDIC HARDWARE: ICD-10-CM

## 2023-12-27 DIAGNOSIS — M25.571 PAIN, JOINT, FOOT, RIGHT: ICD-10-CM

## 2023-12-27 DIAGNOSIS — M76.70 PERONEAL TENDONITIS, UNSPECIFIED LATERALITY: ICD-10-CM

## 2023-12-27 DIAGNOSIS — S93.311A CUBOID SYNDROME OF RIGHT FOOT: ICD-10-CM

## 2023-12-27 PROCEDURE — 99213 OFFICE O/P EST LOW 20 MIN: CPT | Mod: S$PBB,,, | Performed by: PODIATRIST

## 2023-12-27 PROCEDURE — 99999 PR PBB SHADOW E&M-EST. PATIENT-LVL IV: CPT | Mod: PBBFAC,,, | Performed by: PODIATRIST

## 2023-12-27 PROCEDURE — 99213 PR OFFICE/OUTPT VISIT, EST, LEVL III, 20-29 MIN: ICD-10-PCS | Mod: S$PBB,,, | Performed by: PODIATRIST

## 2023-12-27 PROCEDURE — 99999 PR PBB SHADOW E&M-EST. PATIENT-LVL IV: ICD-10-PCS | Mod: PBBFAC,,, | Performed by: PODIATRIST

## 2023-12-27 PROCEDURE — 99214 OFFICE O/P EST MOD 30 MIN: CPT | Mod: PBBFAC | Performed by: PODIATRIST

## 2023-12-27 RX ORDER — ASPIRIN 325 MG
1250 TABLET, DELAYED RELEASE (ENTERIC COATED) ORAL
COMMUNITY
Start: 2023-12-15

## 2023-12-27 RX ORDER — IBUPROFEN 800 MG/1
800 TABLET ORAL EVERY 6 HOURS PRN
COMMUNITY
Start: 2023-12-11

## 2023-12-27 RX ORDER — OXYCODONE AND ACETAMINOPHEN 7.5; 325 MG/1; MG/1
1 TABLET ORAL 3 TIMES DAILY
Qty: 90 TABLET | Refills: 0 | Status: SHIPPED | OUTPATIENT
Start: 2023-12-27 | End: 2024-01-17 | Stop reason: SDUPTHER

## 2023-12-27 NOTE — PROGRESS NOTES
Subjective:       Patient ID: Sg Sesay is a 48 y.o. male.    Chief Complaint: Follow-up  Patient presents today with complaint of pain on the right foot.  Patient also has a history of MS as well as chronic pain of both feet with associated neuritis.      Foot Pain  Associated symptoms include joint swelling.   Follow-up  Associated symptoms include joint swelling.   Medication Refill  Associated symptoms include joint swelling.   Toe Pain     Ankle Pain     Foot Injury        Review of Systems   Musculoskeletal:  Positive for back pain and joint swelling.   All other systems reviewed and are negative.      Objective:      Physical Exam  Vitals and nursing note reviewed.   Constitutional:       Appearance: Normal appearance. He is well-developed.   Cardiovascular:      Rate and Rhythm: Normal rate and regular rhythm.      Pulses: Normal pulses.           Dorsalis pedis pulses are 2+ on the right side and 2+ on the left side.        Posterior tibial pulses are 2+ on the right side and 2+ on the left side.      Heart sounds: Normal heart sounds.   Pulmonary:      Effort: Pulmonary effort is normal.      Breath sounds: Normal breath sounds.   Musculoskeletal:         General: Swelling, tenderness and deformity present.      Right foot: Decreased range of motion. Deformity present.        Feet:    Feet:      Right foot:      Protective Sensation: 4 sites tested.  4 sites sensed.      Left foot:      Protective Sensation: 4 sites tested.  4 sites sensed.   Skin:     General: Skin is warm.      Capillary Refill: Capillary refill takes less than 2 seconds.   Neurological:      General: No focal deficit present.      Mental Status: He is alert.   Psychiatric:         Behavior: Behavior normal.         Thought Content: Thought content normal.         Judgment: Judgment normal.                                                            Assessment:       1. Neuritis    2. Cuboid syndrome of right foot    3. Painful  orthopaedic hardware    4. Pain, joint, foot, right    5. Peroneal tendonitis, unspecified laterality    6. Pain management contract agreement    7. MS (multiple sclerosis)          Plan:       Patient presents today with complaint of pain on the right foot.  Patient also has a history of MS as well as chronic pain of both feet with associated neuritis.  Patient states there is really been no change in his right foot he is considering surgery as I have previously discussed to remove the hardware and resect the base of the 5th metatarsal and a portion of the cuboid bone that has become very prominent causing discomfort on the plantar surface of the patient's right foot.  Patient does see me for pain management for chronic neuritis of the right foot.  Plan follow-up will be 1 month.  Patient will consider surgery as previously discussed.  Patient wears his arch supports at all times and states he can absolutely not walk without the arch supports.  Patient is going to consider surgery as discussed with him today and contact us with any problems questions or concerns.  Patient indicated today he is likely going to have to pursue the surgery that I have discussed to resect a portion of the base of the 5th metatarsal cuboid bone area removal of hardware.  Patient is currently under my care for pain management related to bilateral foot pain right greater than left that is being managed by pain medication and Lyrica.  Patient has a current pain management contract and up-to-date a drug screen on file.    Surgery again discussed with the patient he states he knows he is going to need to have something done and is going to have to make a decision soon because this is not getting any better and can only improve with additional surgical intervention the patient is aware and understands this.  Patient had also indicated that he pulled something on the bottom of his left foot recently he is had pain and discomfort this is along  the course of the plantar fascial ligament plain film x-rays were evaluated bilateral today there are no fracture no signs of dislocation on the left foot although the patient does have degenerative spurring in and around the cuboid area.  Patient states he is considering having surgery next month he relates his foot is not gotten any better on the right side and he needs to do something.  Patient is complaining of bilateral lower extremity swelling however he has run out of his diclofenac which he must take to control associated inflammation.  This note was created using [employer] voice recognition software that occasionally misinterpreted phrases or words.

## 2024-01-17 ENCOUNTER — OFFICE VISIT (OUTPATIENT)
Dept: PODIATRY | Facility: CLINIC | Age: 49
End: 2024-01-17
Payer: MEDICARE

## 2024-01-17 VITALS
DIASTOLIC BLOOD PRESSURE: 82 MMHG | SYSTOLIC BLOOD PRESSURE: 137 MMHG | HEIGHT: 72 IN | BODY MASS INDEX: 26.41 KG/M2 | HEART RATE: 91 BPM | OXYGEN SATURATION: 96 % | WEIGHT: 195 LBS

## 2024-01-17 DIAGNOSIS — M79.671 FOOT PAIN, RIGHT: ICD-10-CM

## 2024-01-17 DIAGNOSIS — T84.84XA PAINFUL ORTHOPAEDIC HARDWARE: ICD-10-CM

## 2024-01-17 DIAGNOSIS — M79.2 NEURITIS: ICD-10-CM

## 2024-01-17 DIAGNOSIS — M76.70 PERONEAL TENDONITIS, UNSPECIFIED LATERALITY: ICD-10-CM

## 2024-01-17 DIAGNOSIS — Z02.89 PAIN MANAGEMENT CONTRACT AGREEMENT: ICD-10-CM

## 2024-01-17 DIAGNOSIS — S93.311A CUBOID SYNDROME OF RIGHT FOOT: Primary | ICD-10-CM

## 2024-01-17 PROCEDURE — 99999 PR PBB SHADOW E&M-EST. PATIENT-LVL V: CPT | Mod: PBBFAC,,, | Performed by: PODIATRIST

## 2024-01-17 PROCEDURE — 99215 OFFICE O/P EST HI 40 MIN: CPT | Mod: PBBFAC | Performed by: PODIATRIST

## 2024-01-17 PROCEDURE — 99214 OFFICE O/P EST MOD 30 MIN: CPT | Mod: S$PBB,,, | Performed by: PODIATRIST

## 2024-01-17 RX ORDER — OXYCODONE AND ACETAMINOPHEN 7.5; 325 MG/1; MG/1
1 TABLET ORAL 3 TIMES DAILY
Qty: 42 TABLET | Refills: 0 | Status: SHIPPED | OUTPATIENT
Start: 2024-01-17 | End: 2024-01-29 | Stop reason: SDUPTHER

## 2024-01-18 ENCOUNTER — ANESTHESIA EVENT (OUTPATIENT)
Dept: SURGERY | Facility: HOSPITAL | Age: 49
End: 2024-01-18
Payer: MEDICARE

## 2024-01-18 NOTE — ANESTHESIA PREPROCEDURE EVALUATION
01/18/2024  Sg Sesay is a 48 y.o., male.      Pre-op Assessment    I have reviewed the Patient Summary Reports.     I have reviewed the Nursing Notes. I have reviewed the NPO Status.   I have reviewed the Medications.     Review of Systems  Anesthesia Hx:  No problems with previous Anesthesia   History of prior surgery of interest to airway management or planning: (multiple foot procedures--easy airway, LMA)          Denies Family Hx of Anesthesia complications.    Denies Personal Hx of Anesthesia complications.                    Social:  Alcohol Use H/o ETOH listed in outside chart      Hematology/Oncology:  Hematology Normal   Oncology Normal                                   EENT/Dental:  EENT/Dental Normal           Cardiovascular:     Hypertension           hyperlipidemia    Outside record                         Pulmonary:  Pulmonary Normal        HARISH and hypersomnolence listed in outside records.  No sleep study found               Renal/:  Renal/ Normal                 Musculoskeletal:  Arthritis   Multiple sclerosis listed as problem.  Cannot find neurology consultation.     CPS- pain contract            Neurological:    Neuromuscular Disease,             Chronic Pain Syndrome                         Endocrine:  Endocrine Normal            Dermatological:  Skin Normal    Psych:  Psychiatric Normal                    Physical Exam  General: Well nourished, Cooperative, Alert and Oriented    Airway:  Mallampati: II   Mouth Opening: Normal  TM Distance: Normal  Tongue: Normal  Neck ROM: Normal ROM    Dental:  Intact    Chest/Lungs:  Clear to auscultation, Normal Respiratory Rate    Heart:  Rate: Normal  Rhythm: Regular Rhythm        Anesthesia Plan  Type of Anesthesia, risks & benefits discussed:    Anesthesia Type: Gen Supraglottic Airway  Intra-op Monitoring Plan: Standard ASA  Monitors  Post Op Pain Control Plan: multimodal analgesia  Induction:  IV  Informed Consent: Informed consent signed with the Patient and all parties understand the risks and agree with anesthesia plan.  All questions answered.   ASA Score: 2  Day of Surgery Review of History & Physical: H&P Update referred to the surgeon/provider.    Ready For Surgery From Anesthesia Perspective.     .

## 2024-01-20 NOTE — PROGRESS NOTES
Subjective:       Patient ID: Sg Sesay is a 48 y.o. male.    Chief Complaint: Follow-up  Patient presents today with complaint of pain on the right foot.  Patient also has a history of MS as well as chronic pain of both feet with associated neuritis.      Foot Pain  Associated symptoms include joint swelling.   Follow-up  Associated symptoms include joint swelling.   Medication Refill  Associated symptoms include joint swelling.   Toe Pain     Ankle Pain     Foot Injury        Review of Systems   Musculoskeletal:  Positive for back pain and joint swelling.   All other systems reviewed and are negative.      Objective:      Physical Exam  Vitals and nursing note reviewed.   Constitutional:       Appearance: Normal appearance. He is well-developed.   Cardiovascular:      Rate and Rhythm: Normal rate and regular rhythm.      Pulses: Normal pulses.           Dorsalis pedis pulses are 2+ on the right side and 2+ on the left side.        Posterior tibial pulses are 2+ on the right side and 2+ on the left side.      Heart sounds: Normal heart sounds.   Pulmonary:      Effort: Pulmonary effort is normal.      Breath sounds: Normal breath sounds.   Musculoskeletal:         General: Swelling, tenderness and deformity present.      Right foot: Decreased range of motion. Deformity present.        Feet:    Feet:      Right foot:      Protective Sensation: 4 sites tested.  4 sites sensed.      Left foot:      Protective Sensation: 4 sites tested.  4 sites sensed.   Skin:     General: Skin is warm.      Capillary Refill: Capillary refill takes less than 2 seconds.   Neurological:      General: No focal deficit present.      Mental Status: He is alert.   Psychiatric:         Behavior: Behavior normal.         Thought Content: Thought content normal.         Judgment: Judgment normal.                Assessment:       1. Cuboid syndrome of right foot    2. Peroneal tendonitis, unspecified laterality    3. Foot pain, right     4. Neuritis    5. Pain management contract agreement    6. Painful orthopaedic hardware          Plan:       Patient presents today with complaint of pain on the right foot.  Patient also has a history of MS as well as chronic pain of both feet with associated neuritis.  Patient states there is really been no change in his right foot he is considering surgery as I have previously discussed to remove the hardware and resect the base of the 5th metatarsal and a portion of the cuboid bone that has become very prominent causing discomfort on the plantar surface of the patient's right foot.  Patient does see me for pain management for chronic neuritis of the right foot.  Patient is currently under my care for pain management related to bilateral foot pain right greater than left that is being managed by pain medication and Lyrica.  Patient has a current pain management contract and up-to-date a drug screen on file.    Patient indicated today that he does want to pursue surgery as previously discussed he states he can not continue to go on the way he is with a severe right foot pain he understands everything that is involved removing all the hardware resecting a portion of the cuboid and base of the 5th metatarsal and then redirecting the peroneus brevis with a tendon anchor.  We have tentatively scheduled surgery for February 2nd 2024 patient will be seen for a preoperative evaluation prior to that time I did give him a prescription for pain medication today for the next 2 weeks so that this does not interfere with his postoperative pain medication.  Patient has a lot more swelling on both the dorsal lateral and plantar aspect of the patient's right foot in and around the base of the 5th metatarsal and cuboid.  All aspects of the surgical procedure were discussed at length and detail.  Patient advised to contact us with any problems questions or concerns prior to his preoperative evaluation.  This note was created  using [employer] voice recognition software that occasionally misinterpreted phrases or words.

## 2024-01-22 ENCOUNTER — PATIENT MESSAGE (OUTPATIENT)
Dept: PSYCHIATRY | Facility: CLINIC | Age: 49
End: 2024-01-22
Payer: MEDICARE

## 2024-01-29 ENCOUNTER — OFFICE VISIT (OUTPATIENT)
Dept: PODIATRY | Facility: CLINIC | Age: 49
End: 2024-01-29
Payer: MEDICARE

## 2024-01-29 ENCOUNTER — HOSPITAL ENCOUNTER (OUTPATIENT)
Dept: RADIOLOGY | Facility: HOSPITAL | Age: 49
Discharge: HOME | End: 2024-01-29
Attending: PODIATRIST
Payer: MEDICARE

## 2024-01-29 VITALS
OXYGEN SATURATION: 98 % | BODY MASS INDEX: 25.73 KG/M2 | DIASTOLIC BLOOD PRESSURE: 83 MMHG | WEIGHT: 190 LBS | SYSTOLIC BLOOD PRESSURE: 144 MMHG | HEIGHT: 72 IN | HEART RATE: 69 BPM

## 2024-01-29 DIAGNOSIS — M76.70 PERONEAL TENDONITIS, UNSPECIFIED LATERALITY: ICD-10-CM

## 2024-01-29 DIAGNOSIS — M25.571 PAIN, JOINT, FOOT, RIGHT: ICD-10-CM

## 2024-01-29 DIAGNOSIS — T84.84XA PAINFUL ORTHOPAEDIC HARDWARE: ICD-10-CM

## 2024-01-29 DIAGNOSIS — S93.311A CUBOID SYNDROME OF RIGHT FOOT: ICD-10-CM

## 2024-01-29 DIAGNOSIS — Z01.818 PRE-OP EXAM: ICD-10-CM

## 2024-01-29 DIAGNOSIS — G35 MS (MULTIPLE SCLEROSIS): ICD-10-CM

## 2024-01-29 DIAGNOSIS — M79.2 NEURITIS: ICD-10-CM

## 2024-01-29 DIAGNOSIS — S93.311A CUBOID SYNDROME OF RIGHT FOOT: Primary | ICD-10-CM

## 2024-01-29 PROCEDURE — 99215 OFFICE O/P EST HI 40 MIN: CPT | Mod: PBBFAC | Performed by: PODIATRIST

## 2024-01-29 PROCEDURE — 73630 X-RAY EXAM OF FOOT: CPT | Mod: 26,RT,, | Performed by: RADIOLOGY

## 2024-01-29 PROCEDURE — 99215 OFFICE O/P EST HI 40 MIN: CPT | Mod: 57,S$PBB,, | Performed by: PODIATRIST

## 2024-01-29 PROCEDURE — 99999 PR PBB SHADOW E&M-EST. PATIENT-LVL V: CPT | Mod: PBBFAC,,, | Performed by: PODIATRIST

## 2024-01-29 PROCEDURE — 73630 X-RAY EXAM OF FOOT: CPT | Mod: TC,RT

## 2024-01-29 RX ORDER — SODIUM CHLORIDE, SODIUM LACTATE, POTASSIUM CHLORIDE, CALCIUM CHLORIDE 600; 310; 30; 20 MG/100ML; MG/100ML; MG/100ML; MG/100ML
INJECTION, SOLUTION INTRAVENOUS CONTINUOUS
Status: CANCELLED | OUTPATIENT
Start: 2024-02-02

## 2024-01-29 RX ORDER — SULFAMETHOXAZOLE AND TRIMETHOPRIM 800; 160 MG/1; MG/1
1 TABLET ORAL 2 TIMES DAILY
Qty: 28 TABLET | Refills: 0 | Status: SHIPPED | OUTPATIENT
Start: 2024-01-29 | End: 2024-02-19 | Stop reason: SDUPTHER

## 2024-01-29 RX ORDER — OXYCODONE AND ACETAMINOPHEN 7.5; 325 MG/1; MG/1
1 TABLET ORAL
Qty: 30 TABLET | Refills: 0 | Status: SHIPPED | OUTPATIENT
Start: 2024-01-29 | End: 2024-02-03

## 2024-01-29 NOTE — H&P (VIEW-ONLY)
Subjective:       Patient ID: Sg Sesay is a 48 y.o. male.    Chief Complaint: Pre-op Exam  Patient presents today with complaint of pain on the right foot.  Patient also has a history of MS as well as chronic pain of both feet with associated neuritis.   Patient presents for additional surgical consultation as previously discussed.   Foot Pain  Associated symptoms include joint swelling.   Follow-up  Associated symptoms include joint swelling.   Medication Refill  Associated symptoms include joint swelling.   Toe Pain     Ankle Pain     Foot Injury        Review of Systems   Musculoskeletal:  Positive for back pain and joint swelling.   All other systems reviewed and are negative.      Objective:      Physical Exam  Vitals and nursing note reviewed.   Constitutional:       Appearance: Normal appearance. He is well-developed.   Cardiovascular:      Rate and Rhythm: Normal rate and regular rhythm.      Pulses: Normal pulses.           Dorsalis pedis pulses are 2+ on the right side and 2+ on the left side.        Posterior tibial pulses are 2+ on the right side and 2+ on the left side.      Heart sounds: Normal heart sounds.   Pulmonary:      Effort: Pulmonary effort is normal.      Breath sounds: Normal breath sounds.   Musculoskeletal:         General: Swelling, tenderness and deformity present.      Right foot: Decreased range of motion. Deformity present.        Feet:    Feet:      Right foot:      Protective Sensation: 4 sites tested.  4 sites sensed.      Skin integrity: Erythema and warmth present.      Left foot:      Protective Sensation: 4 sites tested.  4 sites sensed.      Skin integrity: Skin integrity normal.   Skin:     General: Skin is warm.      Capillary Refill: Capillary refill takes less than 2 seconds.   Neurological:      General: No focal deficit present.      Mental Status: He is alert.   Psychiatric:         Mood and Affect: Mood normal.         Behavior: Behavior normal.          Thought Content: Thought content normal.         Judgment: Judgment normal.                              Assessment:       1. Cuboid syndrome of right foot    2. Peroneal tendonitis, unspecified laterality    3. Painful orthopaedic hardware    4. Neuritis    5. MS (multiple sclerosis)    6. Pre-op exam    7. Pain, joint, foot, right          Plan:       Patient presents today with complaint of pain on the right foot.  Patient also has a history of MS as well as chronic pain of both feet with associated neuritis.  Patient states there is really been no change in his right foot he is considering surgery as I have previously discussed to remove the hardware and resect the base of the 5th metatarsal and a portion of the cuboid bone that has become very prominent causing discomfort on the plantar surface of the patient's right foot.  Patient does see me for pain management for chronic neuritis of the right foot.  Patient is currently under my care for pain management related to bilateral foot pain right greater than left that is being managed by pain medication and Lyrica.  Patient has a current pain management contract and up-to-date a drug screen on file.    Patient indicated today that he does want to pursue surgery as previously discussed he states he can not continue to go on the way he is with a severe right foot pain he understands everything that is involved removing all the hardware resecting a portion of the cuboid and base of the 5th metatarsal and then redirecting the peroneus brevis with a tendon anchor.  Patient understands he will be nonweightbearing for a period of 6 weeks as the tendon heals back to the bone followed by several weeks of partial weight-bearing in a fracture boot.  X-rays taken today there has been no significant change in the structure of the right foot since the previous x-ray all aspects of surgery were discussed at length and in detail.  Patient may decision to pursue surgery as  discussed.Patient presents today for preoperative history and physical in discussion all aspects of surgery were discussed with the patient no guarantees were given written or implied all potential complications including but not limited to delayed healing nonhealing postoperative pain infection recurrence were discussed with the patient in detail. All aspect of the patient's recovery time involved in recovery patient responsibility is involving recovery were also discussed in detail. Patient advised failure to comply with postoperative care will jeopardize surgical outcome.  All aspects of surgical intervention were discussed at length and in detail patient understands he will be in a postoperative splint for 6 weeks followed by several weeks in a fracture boot ambulating as tolerated with gradual transition to normal shoe gear.  Patient was dispensed prescriptions today for Percocet and Bactrim he currently takes Percocet for pain management but will now be taking this every 4 hours as needed postoperatively for 5 days postop.  Patient understands the importance of ice and elevation he will also take over-the-counter ibuprofen or Motrin in between doses of the pain medication to help with inflammation and he will start a daily aspirin 24 hours after surgery to minimize the possibility of DVT because of his immobility and nonweightbearing status.  Patient already has a knee scooter.  Patient understands the numbness and tingling he has in the lateral portion of his foot may not be relieved following the surgery that may be permanent nerve damage but this surgery is to address the chronic inflammation and bulge on the plantar lateral aspect of the right foot due to the chronic inflammation surrounding the base of the 5th metatarsal and cuboid.  I did discuss redirection of the peroneus brevis with tendon anchor as necessary.  Patient understands all risks involved potential complications and consented to the  procedure.  Surgery has been scheduled for February 2, 2024 patient has been advised to contact us with any problems questions or concerns prior to surgery.  Preoperative lab work has been ordered and evaluated as was the patient's plain film x-ray today.  Total face-to-face time including discussion evaluation treatment discussion of treatment options treatment plan surgical consultation review of x-rays chart review preoperative history and physical and documentation of today's visit equaled 40 minutes.  This note was created using [employer] voice recognition software that occasionally misinterpreted phrases or words.

## 2024-01-29 NOTE — PATIENT INSTRUCTIONS
Nothing to eat or drink after midnight.  If you take medication for high blood pressure take this in the morning with a sip of water prior to surgery.     Arrive at out patient registration at 10:00 am

## 2024-01-29 NOTE — PROGRESS NOTES
Subjective:       Patient ID: Sg Sesay is a 48 y.o. male.    Chief Complaint: Pre-op Exam  Patient presents today with complaint of pain on the right foot.  Patient also has a history of MS as well as chronic pain of both feet with associated neuritis.   Patient presents for additional surgical consultation as previously discussed.   Foot Pain  Associated symptoms include joint swelling.   Follow-up  Associated symptoms include joint swelling.   Medication Refill  Associated symptoms include joint swelling.   Toe Pain     Ankle Pain     Foot Injury        Review of Systems   Musculoskeletal:  Positive for back pain and joint swelling.   All other systems reviewed and are negative.      Objective:      Physical Exam  Vitals and nursing note reviewed.   Constitutional:       Appearance: Normal appearance. He is well-developed.   Cardiovascular:      Rate and Rhythm: Normal rate and regular rhythm.      Pulses: Normal pulses.           Dorsalis pedis pulses are 2+ on the right side and 2+ on the left side.        Posterior tibial pulses are 2+ on the right side and 2+ on the left side.      Heart sounds: Normal heart sounds.   Pulmonary:      Effort: Pulmonary effort is normal.      Breath sounds: Normal breath sounds.   Musculoskeletal:         General: Swelling, tenderness and deformity present.      Right foot: Decreased range of motion. Deformity present.        Feet:    Feet:      Right foot:      Protective Sensation: 4 sites tested.  4 sites sensed.      Skin integrity: Erythema and warmth present.      Left foot:      Protective Sensation: 4 sites tested.  4 sites sensed.      Skin integrity: Skin integrity normal.   Skin:     General: Skin is warm.      Capillary Refill: Capillary refill takes less than 2 seconds.   Neurological:      General: No focal deficit present.      Mental Status: He is alert.   Psychiatric:         Mood and Affect: Mood normal.         Behavior: Behavior normal.          Thought Content: Thought content normal.         Judgment: Judgment normal.                              Assessment:       1. Cuboid syndrome of right foot    2. Peroneal tendonitis, unspecified laterality    3. Painful orthopaedic hardware    4. Neuritis    5. MS (multiple sclerosis)    6. Pre-op exam    7. Pain, joint, foot, right          Plan:       Patient presents today with complaint of pain on the right foot.  Patient also has a history of MS as well as chronic pain of both feet with associated neuritis.  Patient states there is really been no change in his right foot he is considering surgery as I have previously discussed to remove the hardware and resect the base of the 5th metatarsal and a portion of the cuboid bone that has become very prominent causing discomfort on the plantar surface of the patient's right foot.  Patient does see me for pain management for chronic neuritis of the right foot.  Patient is currently under my care for pain management related to bilateral foot pain right greater than left that is being managed by pain medication and Lyrica.  Patient has a current pain management contract and up-to-date a drug screen on file.    Patient indicated today that he does want to pursue surgery as previously discussed he states he can not continue to go on the way he is with a severe right foot pain he understands everything that is involved removing all the hardware resecting a portion of the cuboid and base of the 5th metatarsal and then redirecting the peroneus brevis with a tendon anchor.  Patient understands he will be nonweightbearing for a period of 6 weeks as the tendon heals back to the bone followed by several weeks of partial weight-bearing in a fracture boot.  X-rays taken today there has been no significant change in the structure of the right foot since the previous x-ray all aspects of surgery were discussed at length and in detail.  Patient may decision to pursue surgery as  discussed.Patient presents today for preoperative history and physical in discussion all aspects of surgery were discussed with the patient no guarantees were given written or implied all potential complications including but not limited to delayed healing nonhealing postoperative pain infection recurrence were discussed with the patient in detail. All aspect of the patient's recovery time involved in recovery patient responsibility is involving recovery were also discussed in detail. Patient advised failure to comply with postoperative care will jeopardize surgical outcome.  All aspects of surgical intervention were discussed at length and in detail patient understands he will be in a postoperative splint for 6 weeks followed by several weeks in a fracture boot ambulating as tolerated with gradual transition to normal shoe gear.  Patient was dispensed prescriptions today for Percocet and Bactrim he currently takes Percocet for pain management but will now be taking this every 4 hours as needed postoperatively for 5 days postop.  Patient understands the importance of ice and elevation he will also take over-the-counter ibuprofen or Motrin in between doses of the pain medication to help with inflammation and he will start a daily aspirin 24 hours after surgery to minimize the possibility of DVT because of his immobility and nonweightbearing status.  Patient already has a knee scooter.  Patient understands the numbness and tingling he has in the lateral portion of his foot may not be relieved following the surgery that may be permanent nerve damage but this surgery is to address the chronic inflammation and bulge on the plantar lateral aspect of the right foot due to the chronic inflammation surrounding the base of the 5th metatarsal and cuboid.  I did discuss redirection of the peroneus brevis with tendon anchor as necessary.  Patient understands all risks involved potential complications and consented to the  procedure.  Surgery has been scheduled for February 2, 2024 patient has been advised to contact us with any problems questions or concerns prior to surgery.  Preoperative lab work has been ordered and evaluated as was the patient's plain film x-ray today.  Total face-to-face time including discussion evaluation treatment discussion of treatment options treatment plan surgical consultation review of x-rays chart review preoperative history and physical and documentation of today's visit equaled 40 minutes.  This note was created using [employer] voice recognition software that occasionally misinterpreted phrases or words.

## 2024-02-02 ENCOUNTER — ANESTHESIA (OUTPATIENT)
Dept: SURGERY | Facility: HOSPITAL | Age: 49
End: 2024-02-02
Payer: MEDICARE

## 2024-02-02 ENCOUNTER — HOSPITAL ENCOUNTER (OUTPATIENT)
Facility: HOSPITAL | Age: 49
Discharge: HOME | End: 2024-02-02
Attending: PODIATRIST | Admitting: PODIATRIST
Payer: MEDICARE

## 2024-02-02 VITALS
SYSTOLIC BLOOD PRESSURE: 152 MMHG | OXYGEN SATURATION: 96 % | HEIGHT: 72 IN | TEMPERATURE: 97 F | DIASTOLIC BLOOD PRESSURE: 91 MMHG | WEIGHT: 185 LBS | RESPIRATION RATE: 16 BRPM | HEART RATE: 92 BPM | BODY MASS INDEX: 25.06 KG/M2

## 2024-02-02 DIAGNOSIS — M76.70 PERONEAL TENDONITIS, UNSPECIFIED LATERALITY: ICD-10-CM

## 2024-02-02 DIAGNOSIS — M79.671 CHRONIC PAIN IN RIGHT FOOT: ICD-10-CM

## 2024-02-02 DIAGNOSIS — G89.29 CHRONIC PAIN IN RIGHT FOOT: ICD-10-CM

## 2024-02-02 DIAGNOSIS — T84.84XA PAINFUL ORTHOPAEDIC HARDWARE: ICD-10-CM

## 2024-02-02 DIAGNOSIS — M19.071 PRIMARY OSTEOARTHRITIS OF RIGHT FOOT: ICD-10-CM

## 2024-02-02 DIAGNOSIS — M79.2 NEURITIS: ICD-10-CM

## 2024-02-02 DIAGNOSIS — S93.311A CUBOID SYNDROME OF RIGHT FOOT: Primary | ICD-10-CM

## 2024-02-02 PROCEDURE — 63600175 PHARM REV CODE 636 W HCPCS: Performed by: NURSE ANESTHETIST, CERTIFIED REGISTERED

## 2024-02-02 PROCEDURE — 88300 SURGICAL PATH GROSS: CPT | Performed by: PATHOLOGY

## 2024-02-02 PROCEDURE — 27800903 OPTIME MED/SURG SUP & DEVICES OTHER IMPLANTS: Performed by: PODIATRIST

## 2024-02-02 PROCEDURE — 71000015 HC POSTOP RECOV 1ST HR: Performed by: PODIATRIST

## 2024-02-02 PROCEDURE — 25000003 PHARM REV CODE 250: Performed by: PODIATRIST

## 2024-02-02 PROCEDURE — 88307 TISSUE EXAM BY PATHOLOGIST: CPT | Mod: 26,,, | Performed by: PATHOLOGY

## 2024-02-02 PROCEDURE — 37000008 HC ANESTHESIA 1ST 15 MINUTES: Performed by: PODIATRIST

## 2024-02-02 PROCEDURE — 37000009 HC ANESTHESIA EA ADD 15 MINS: Performed by: PODIATRIST

## 2024-02-02 PROCEDURE — 20680 REMOVAL OF IMPLANT DEEP: CPT | Mod: 59,RT,, | Performed by: PODIATRIST

## 2024-02-02 PROCEDURE — 88300 SURGICAL PATH GROSS: CPT | Mod: 26,,, | Performed by: PATHOLOGY

## 2024-02-02 PROCEDURE — 36000709 HC OR TIME LEV III EA ADD 15 MIN: Performed by: PODIATRIST

## 2024-02-02 PROCEDURE — 28308 INCISION OF METATARSAL: CPT | Mod: 51,RT,, | Performed by: PODIATRIST

## 2024-02-02 PROCEDURE — 71000033 HC RECOVERY, INTIAL HOUR: Performed by: PODIATRIST

## 2024-02-02 PROCEDURE — 28304 INCISION OF MIDFOOT BONES: CPT | Mod: 51,RT,, | Performed by: PODIATRIST

## 2024-02-02 PROCEDURE — 27691 REVISE LOWER LEG TENDON: CPT | Mod: RT,,, | Performed by: PODIATRIST

## 2024-02-02 PROCEDURE — 36000708 HC OR TIME LEV III 1ST 15 MIN: Performed by: PODIATRIST

## 2024-02-02 PROCEDURE — 63600175 PHARM REV CODE 636 W HCPCS: Performed by: PODIATRIST

## 2024-02-02 PROCEDURE — 88305 TISSUE EXAM BY PATHOLOGIST: CPT | Performed by: PATHOLOGY

## 2024-02-02 PROCEDURE — 25000003 PHARM REV CODE 250: Performed by: NURSE ANESTHETIST, CERTIFIED REGISTERED

## 2024-02-02 PROCEDURE — 63600175 PHARM REV CODE 636 W HCPCS: Mod: JZ,JG | Performed by: PODIATRIST

## 2024-02-02 PROCEDURE — 27201423 OPTIME MED/SURG SUP & DEVICES STERILE SUPPLY: Performed by: PODIATRIST

## 2024-02-02 PROCEDURE — D9220A PRA ANESTHESIA: Mod: ,,, | Performed by: NURSE ANESTHETIST, CERTIFIED REGISTERED

## 2024-02-02 RX ORDER — SODIUM CHLORIDE, SODIUM LACTATE, POTASSIUM CHLORIDE, CALCIUM CHLORIDE 600; 310; 30; 20 MG/100ML; MG/100ML; MG/100ML; MG/100ML
INJECTION, SOLUTION INTRAVENOUS CONTINUOUS
Status: DISCONTINUED | OUTPATIENT
Start: 2024-02-02 | End: 2024-02-02 | Stop reason: HOSPADM

## 2024-02-02 RX ORDER — ONDANSETRON HYDROCHLORIDE 2 MG/ML
INJECTION, SOLUTION INTRAMUSCULAR; INTRAVENOUS
Status: DISCONTINUED | OUTPATIENT
Start: 2024-02-02 | End: 2024-02-02

## 2024-02-02 RX ORDER — MIDAZOLAM HYDROCHLORIDE 1 MG/ML
INJECTION INTRAMUSCULAR; INTRAVENOUS
Status: DISCONTINUED | OUTPATIENT
Start: 2024-02-02 | End: 2024-02-02

## 2024-02-02 RX ORDER — HYDROMORPHONE HYDROCHLORIDE 1 MG/ML
0.5 INJECTION, SOLUTION INTRAMUSCULAR; INTRAVENOUS; SUBCUTANEOUS EVERY 5 MIN PRN
Status: DISCONTINUED | OUTPATIENT
Start: 2024-02-02 | End: 2024-02-02 | Stop reason: HOSPADM

## 2024-02-02 RX ORDER — LIDOCAINE HYDROCHLORIDE 10 MG/ML
1 INJECTION, SOLUTION EPIDURAL; INFILTRATION; INTRACAUDAL; PERINEURAL ONCE
Status: DISCONTINUED | OUTPATIENT
Start: 2024-02-02 | End: 2024-02-02 | Stop reason: HOSPADM

## 2024-02-02 RX ORDER — MEPERIDINE HYDROCHLORIDE 50 MG/ML
12.5 INJECTION INTRAMUSCULAR; INTRAVENOUS; SUBCUTANEOUS EVERY 10 MIN PRN
Status: DISCONTINUED | OUTPATIENT
Start: 2024-02-02 | End: 2024-02-02 | Stop reason: HOSPADM

## 2024-02-02 RX ORDER — FENTANYL CITRATE 50 UG/ML
INJECTION, SOLUTION INTRAMUSCULAR; INTRAVENOUS
Status: DISCONTINUED | OUTPATIENT
Start: 2024-02-02 | End: 2024-02-02

## 2024-02-02 RX ORDER — BUPIVACAINE HYDROCHLORIDE 5 MG/ML
INJECTION, SOLUTION EPIDURAL; INTRACAUDAL
Status: DISCONTINUED | OUTPATIENT
Start: 2024-02-02 | End: 2024-02-02 | Stop reason: HOSPADM

## 2024-02-02 RX ORDER — DEXAMETHASONE SODIUM PHOSPHATE 4 MG/ML
INJECTION, SOLUTION INTRA-ARTICULAR; INTRALESIONAL; INTRAMUSCULAR; INTRAVENOUS; SOFT TISSUE
Status: DISCONTINUED | OUTPATIENT
Start: 2024-02-02 | End: 2024-02-02

## 2024-02-02 RX ORDER — ONDANSETRON HYDROCHLORIDE 2 MG/ML
4 INJECTION, SOLUTION INTRAVENOUS DAILY PRN
Status: DISCONTINUED | OUTPATIENT
Start: 2024-02-02 | End: 2024-02-02 | Stop reason: HOSPADM

## 2024-02-02 RX ORDER — ONDANSETRON HYDROCHLORIDE 2 MG/ML
INJECTION, SOLUTION INTRAVENOUS
Status: DISCONTINUED | OUTPATIENT
Start: 2024-02-02 | End: 2024-02-02

## 2024-02-02 RX ORDER — OXYCODONE HYDROCHLORIDE 5 MG/1
5 TABLET ORAL ONCE AS NEEDED
Status: DISCONTINUED | OUTPATIENT
Start: 2024-02-02 | End: 2024-02-02 | Stop reason: HOSPADM

## 2024-02-02 RX ORDER — EPHEDRINE SULFATE 50 MG/ML
INJECTION, SOLUTION INTRAVENOUS
Status: DISCONTINUED | OUTPATIENT
Start: 2024-02-02 | End: 2024-02-02

## 2024-02-02 RX ORDER — PROPOFOL 10 MG/ML
VIAL (ML) INTRAVENOUS
Status: DISCONTINUED | OUTPATIENT
Start: 2024-02-02 | End: 2024-02-02

## 2024-02-02 RX ORDER — LIDOCAINE HYDROCHLORIDE 10 MG/ML
INJECTION INFILTRATION; PERINEURAL
Status: DISCONTINUED | OUTPATIENT
Start: 2024-02-02 | End: 2024-02-02 | Stop reason: HOSPADM

## 2024-02-02 RX ORDER — LIDOCAINE HYDROCHLORIDE 20 MG/ML
INJECTION INTRAVENOUS
Status: DISCONTINUED | OUTPATIENT
Start: 2024-02-02 | End: 2024-02-02

## 2024-02-02 RX ORDER — SODIUM CHLORIDE, SODIUM LACTATE, POTASSIUM CHLORIDE, CALCIUM CHLORIDE 600; 310; 30; 20 MG/100ML; MG/100ML; MG/100ML; MG/100ML
125 INJECTION, SOLUTION INTRAVENOUS CONTINUOUS
Status: DISCONTINUED | OUTPATIENT
Start: 2024-02-02 | End: 2024-02-02 | Stop reason: HOSPADM

## 2024-02-02 RX ORDER — ACETAMINOPHEN 10 MG/ML
INJECTION, SOLUTION INTRAVENOUS
Status: DISCONTINUED | OUTPATIENT
Start: 2024-02-02 | End: 2024-02-02

## 2024-02-02 RX ADMIN — MIDAZOLAM HYDROCHLORIDE 2 MG: 1 INJECTION, SOLUTION INTRAMUSCULAR; INTRAVENOUS at 11:02

## 2024-02-02 RX ADMIN — PROPOFOL 200 MG: 10 INJECTION, EMULSION INTRAVENOUS at 11:02

## 2024-02-02 RX ADMIN — EPHEDRINE SULFATE 10 MG: 50 INJECTION INTRAVENOUS at 12:02

## 2024-02-02 RX ADMIN — DEXAMETHASONE SODIUM PHOSPHATE 8 MG: 4 INJECTION, SOLUTION INTRAMUSCULAR; INTRAVENOUS at 11:02

## 2024-02-02 RX ADMIN — CEFAZOLIN 2 G: 2 INJECTION, POWDER, FOR SOLUTION INTRAMUSCULAR; INTRAVENOUS at 11:02

## 2024-02-02 RX ADMIN — ACETAMINOPHEN 1000 MG: 10 INJECTION, SOLUTION INTRAVENOUS at 12:02

## 2024-02-02 RX ADMIN — SODIUM CHLORIDE, POTASSIUM CHLORIDE, SODIUM LACTATE AND CALCIUM CHLORIDE: 600; 310; 30; 20 INJECTION, SOLUTION INTRAVENOUS at 11:02

## 2024-02-02 RX ADMIN — LIDOCAINE HYDROCHLORIDE 80 MG: 20 INJECTION, SOLUTION INTRAVENOUS at 11:02

## 2024-02-02 RX ADMIN — ONDANSETRON 8 MG: 2 INJECTION INTRAMUSCULAR; INTRAVENOUS at 01:02

## 2024-02-02 NOTE — ANESTHESIA POSTPROCEDURE EVALUATION
Anesthesia Post Evaluation    Patient: Sg Sesay    Procedure(s) Performed: Procedure(s) (LRB):  REMOVAL, HARDWARE, FOOT (Right)  TRANSFER, TENDON, LOWER EXTREMITY (Right)  OSTECTOMY, TARSAL COALITION (Right)    Final Anesthesia Type: general      Patient location during evaluation: PACU  Patient participation: Yes- Able to Participate  Level of consciousness: awake and alert and oriented  Post-procedure vital signs: reviewed and stable  Pain management: adequate  Airway patency: patent    PONV status at discharge: No PONV  Anesthetic complications: no      Cardiovascular status: blood pressure returned to baseline and hemodynamically stable  Respiratory status: spontaneous ventilation and room air  Hydration status: euvolemic  Follow-up not needed.              Vitals Value Taken Time   /82 02/02/24 1417   Temp 36.2 °C (97.1 °F) 02/02/24 1417   Pulse 101 02/02/24 1418   Resp 12 02/02/24 1418   SpO2 95 % 02/02/24 1418   Vitals shown include unvalidated device data.      No case tracking events are documented in the log.      Pain/Mariano Score: Mariano Score: 3 (2/2/2024  2:14 PM)

## 2024-02-02 NOTE — ANESTHESIA PROCEDURE NOTES
Intubation    Date/Time: 2/2/2024 11:49 AM    Performed by: Debra Martel CRNA  Authorized by: Debra Martel CRNA    Intubation:     Induction:  Intravenous    Intubated:  Postinduction    Mask Ventilation:  Easy mask    Attempts:  1    Attempted By:  CRNA    Difficult Airway Encountered?: No      Complications:  None    Airway Device:  Supraglottic airway/LMA    Airway Device Size:  3.5 (AIRQ)    Style/Cuff Inflation:  Cuffed (inflated to minimal occlusive pressure)    Secured at:  The lips    Placement Verified By:  Capnometry    Complicating Factors:  None    Findings Post-Intubation:  BS equal bilateral and atraumatic/condition of teeth unchanged

## 2024-02-02 NOTE — DISCHARGE SUMMARY
[facility] Surgery  Discharge Note  Short Stay    Procedure(s) (LRB):  REMOVAL, HARDWARE, FOOT (Right)  TRANSFER, TENDON, LOWER EXTREMITY (Right)  OSTECTOMY, TARSAL COALITION (Right)      OUTCOME: Patient tolerated treatment/procedure well without complication and is now ready for discharge.    DISPOSITION: Home or Self Care    FINAL DIAGNOSIS:  Cuboid syndrome of right foot    FOLLOWUP: In clinic    DISCHARGE INSTRUCTIONS:    Discharge Procedure Orders   Keep surgical extremity elevated     Ice to affected area     Lifting restrictions     Weight bearing restrictions (specify):        TIME SPENT ON DISCHARGE: 5 minutes

## 2024-02-02 NOTE — TRANSFER OF CARE
Anesthesia Transfer of Care Note    Patient: Sg Sesay    Procedure(s) Performed: Procedure(s) (LRB):  REMOVAL, HARDWARE, FOOT (Right)  TRANSFER, TENDON, LOWER EXTREMITY (Right)  OSTECTOMY, TARSAL COALITION (Right)    Patient location: PACU    Anesthesia Type: general    Transport from OR: Transported from OR on 2-3 L/min O2 by NC with adequate spontaneous ventilation    Post pain: adequate analgesia    Post assessment: no apparent anesthetic complications and tolerated procedure well    Post vital signs: stable    Level of consciousness: awake and responds to stimulation    Nausea/Vomiting: no nausea/vomiting    Complications: none    Transfer of care protocol was followed      Last vitals: Visit Vitals  /82   Pulse 92   Temp 36.2 °C (97.1 °F) (Tympanic)   Resp (!) 23   Ht 6' (1.829 m)   Wt 83.9 kg (185 lb)   SpO2 95%   BMI 25.09 kg/m²

## 2024-02-02 NOTE — OP NOTE
[facility]  Operative Note     SUMMARY     Surgery Date: 2/2/2024       Pre-op Diagnosis:  Cuboid syndrome of right foot [S93.311A]  Peroneal tendonitis, unspecified laterality [M76.70]  Painful orthopaedic hardware [T84.84XA]    Post-op Diagnosis:  Post-Op Diagnosis Codes:     * Cuboid syndrome of right foot [S93.311A]     * Peroneal tendonitis, unspecified laterality [M76.70]     * Painful orthopaedic hardware [T84.84XA]    Procedure(s) (LRB):  REMOVAL, HARDWARE, FOOT (Right)  TRANSFER, TENDON, LOWER EXTREMITY (Right)  OSTECTOMY, TARSAL COALITION (Right)  Extensive hardware removal right foot procedure code 20680 x2  Resection proximal half of the 5th metatarsal right foot procedure code 28037  Resection a portion of the cuboid bone right foot procedure code 71116  Tendon transfer peroneus brevis right foot procedure code 66709  Surgeon(s) and Role:     * Jah Torres DPM - Primary      Estimated Blood Loss:  20 cc  Hemostasis:  Ankle tourniquet placed at 250 mmHg for a period of 1 hour 14 minutes    Anesthesia:  LMA in conjunction with local infiltrate equaling 30 cc of 1% lidocaine plain  Injectables 30 cc of 0.5% Marcaine plain  Materials sterile irrigant FloSeal Surgicel powder topical thrombin 3.0 nylon 2 Arthrex 3.5 corkscrew anchors bone wax  Pathology 2 separate specimens 1 specimen was consistent with hardware removed from the right foot the other specimen was a white material most likely consistent with gouty tophi or pseudo gout.  Condition stable  Complications none      Description of the findings of the procedure:  Cuboid syndrome of right foot [S93.311A]  Peroneal tendonitis, unspecified laterality [M76.70]  Painful orthopaedic hardware [T84.84XA]         Specimens (From admission, onward)       Start     Ordered    02/02/24 1255  Specimen to Pathology, Surgery Other (Podiatry)  Once        Comments: Pre-op Diagnosis: Cuboid syndrome of right foot [S93.311A]Peroneal tendonitis,  unspecified laterality [M76.70]Painful orthopaedic hardware [T84.84XA]Procedure(s):REMOVAL, HARDWARE, FOOTTRANSFER, TENDON, LOWER EXTREMITYOSTECTOMY, TARSAL COALITION Number of specimens: 1Name of specimens: 1. Hardware; Right foot (four screws; one plate)     References:    Click here for ordering Quick Tip   Question Answer Comment   Procedure Type: Other Podiatry   Specimen Class: Routine/Screening    Which provider would you like to cc? DEJA GUERRA    Release to patient Immediate        02/02/24 9849                     Procedure:  Patient was taken the operating room placed in supine position on the OR table attention was then directed towards the patient's right ankle where Webril cast padding was placed on the patient's right ankle as was an ankle tourniquet.  Following this patient was locally anesthetized in a regional block of the lateral portion of the patient's right foot utilizing a total of 30 cc of 1% lidocaine plain.  Patient's foot was then prepped and draped in the usual sterile manner.  Patient's foot was exsanguinated utilizing Esmarch bandage the ankle tourniquet was raised to 250 mmHg.  Following this intraoperative fluoroscopy was used to plan the incision overlying the dorsal lateral aspect of the patient's right foot a longitudinal linear incision was created overlying the area of previous incision from previous surgery this was carried down to the level of the capsular layer there was extensive scar tissue noted in this area which required extensive debridement of scar tissue down to the level of a fixation plate with multiple screws within the fixation plate itself.  Fixation plate was deepened buried involving the base of the 5th metatarsal cuboid region each of the 4 screws within the plate were carefully removed as was the plate removed there was a larger compression screw underlying the plate which also required additional further debridement and subsequent removal of the screw  that had fractured with the remaining portion of the distal part of the screw in the calcaneus although this is buried and should not cause any problems the screw was removed from this area 2 separate levels of dissection were performed 1 to remove the large plate the 4 screws within the plate and then the underlying compression screw that was buried underlying the plate itself following this the area was flushed irrigated with copious amounts of sterile saline there was extensive bony overgrowth degenerative changes noted at the base of the 5th metatarsal and cuboid region with extensive spurring noted as well as notable scar tissue that required additional debridement.  Patient did have some small amounts of a white material within the surgical field most likely consistent with gouty tophi or pseudo arthrosis this was sent to pathology for evaluation the hardware that was removed was also sent to pathology for evaluation.  Following this the proximal 1/2 of the 5th metatarsal was resected utilizing a sagittal saw this was very degenerative in nature at the base it was very prominent on the plantar lateral aspect of the patient's right foot and had significant bony overgrowth this was carefully resected in  debrided and removed from the surgical field releasing the peroneus brevis tendon from its insertion on the base of the styloid process.  Following removal of the proximal half of the 5th metatarsal attention was directed towards the cuboid which was also noted to be extensively arthritic with extensive bony overgrowth as well as being enlarged a portion of the cuboid bone was then resected utilizing a sagittal saw additionally this area was prepped for a tendon transfer of the peroneus brevis tendon where it had been released from the base of the 5th metatarsal was now being transferred to the newly prepared lateral portion of the cuboid bone right foot.  2-3.5 Arthrex corkscrew anchors were inserted into  the newly prepped surface of the cuboid to allow for the peroneus brevis tendon after properly trimmed to appropriate length to be reattached and repositioned and transferred to the cuboid bone of the right foot the tendon was sutured and locked down to the bone per manufacture guidelines.  The entire length of the incision was flushed irrigated with copious amounts of sterile saline.  Bone wax was applied to the bone that was resected of the 5th metatarsal to limit any bony bleeding the tourniquet was lowered after an hour and 14 minutes to evaluate bleeding in the surgical site Surgicel topical thrombin powder and FloSeal topical thrombin were used to control bleeding at the surgery site to prevent hematoma because of the large area of scar tissue that was resected in addition to bone that was resected from the lateral and lateral plantar portion of the patient's right foot.  Bleeding was well controlled and noted to be minimal primary closure was achieved with 3.0 nylon in a simple interrupted fashion patient was then injected with additional 30 cc of 0.5% Marcaine plain to create a long-term postoperative anesthetic Adaptic nonadhesive dressing sterile 4x4s multiple ABDs multiple rolls of Kerlix were used to dress the area and patient was placed in a lower leg posterior splint where he will maintain complete nonweightbearing status for the next 6 weeks.  Prior to dressing the patient's right foot the plantar lateral aspect of the patient's foot which was noted to be very inflamed very tight and displayed bony  prominence was now soft and supple now that scar tissue in the area and excessive bony overgrowth had been surgically addressed.  Patient left the operating room with vital signs stable and vascular status having return to the patient's preoperative levels.This note was created using M*Modal voice recognition software that occasionally misinterpreted phrases or words.

## 2024-02-05 ENCOUNTER — OFFICE VISIT (OUTPATIENT)
Dept: PODIATRY | Facility: CLINIC | Age: 49
End: 2024-02-05
Payer: MEDICARE

## 2024-02-05 VITALS
DIASTOLIC BLOOD PRESSURE: 84 MMHG | HEART RATE: 81 BPM | BODY MASS INDEX: 25.06 KG/M2 | WEIGHT: 185 LBS | SYSTOLIC BLOOD PRESSURE: 142 MMHG | HEIGHT: 72 IN

## 2024-02-05 DIAGNOSIS — S93.311A CUBOID SYNDROME OF RIGHT FOOT: Primary | ICD-10-CM

## 2024-02-05 PROCEDURE — 29515 APPLICATION SHORT LEG SPLINT: CPT | Mod: 58,S$PBB,RT, | Performed by: PODIATRIST

## 2024-02-05 PROCEDURE — 29515 APPLICATION SHORT LEG SPLINT: CPT | Mod: PBBFAC,RT | Performed by: PODIATRIST

## 2024-02-05 PROCEDURE — 99024 POSTOP FOLLOW-UP VISIT: CPT | Mod: POP,,, | Performed by: PODIATRIST

## 2024-02-05 PROCEDURE — 99999 PR PBB SHADOW E&M-EST. PATIENT-LVL IV: CPT | Mod: PBBFAC,,, | Performed by: PODIATRIST

## 2024-02-05 PROCEDURE — 99214 OFFICE O/P EST MOD 30 MIN: CPT | Mod: PBBFAC | Performed by: PODIATRIST

## 2024-02-06 LAB
FINAL PATHOLOGIC DIAGNOSIS: NORMAL
GROSS: NORMAL
Lab: NORMAL

## 2024-02-06 NOTE — PROGRESS NOTES
Sg Sesay is a 48 y.o. male patient.   1. Cuboid syndrome of right foot      Past Medical History:   Diagnosis Date    Back pain     MS (multiple sclerosis)     Sleep apnea      No past surgical history pertinent negatives on file.  Scheduled Meds:  Continuous Infusions:  PRN Meds:    Review of patient's allergies indicates:   Allergen Reactions    Iodine and iodide containing products Anaphylaxis    Iodine     Shellfish containing products Nausea And Vomiting     Other reaction(s): NAUSEA,VOMITING     There are no hospital problems to display for this patient.    Blood pressure (!) 142/84, pulse 81, height 6' (1.829 m), weight 83.9 kg (185 lb).                      Subjective  Objective  Assessment & Plan  Patient presents status post excision hardware resection a portion of the base of the 5th metatarsal right and excision a portion of the cuboid bone right with peroneus brevis tendon transfer right.  Patient states he is doing well he has not having significant pain or discomfort he has maintain a complete nonweightbearing status patient is currently afebrile and in no acute distress the patient's posterior splint was removed patient did have a blister consistent with swelling this was drained today the area was cleaned a new well-padded thick protective dressing was applied in a new posterior splint applied patient will be seen for follow-up in 1 week he is to continue ice elevation as directed and finish taking his antibiotics as directed surgical site looks very good there has no drainage minimal inflammation and no signs of infection noted.This note was created using M*Modal voice recognition software that occasionally misinterpreted phrases or words.   Jah Torres DPM  2/6/2024

## 2024-02-12 ENCOUNTER — HOSPITAL ENCOUNTER (OUTPATIENT)
Dept: RADIOLOGY | Facility: HOSPITAL | Age: 49
Discharge: HOME | End: 2024-02-12
Attending: PODIATRIST
Payer: MEDICARE

## 2024-02-12 ENCOUNTER — OFFICE VISIT (OUTPATIENT)
Dept: PODIATRY | Facility: CLINIC | Age: 49
End: 2024-02-12
Payer: MEDICARE

## 2024-02-12 ENCOUNTER — TELEPHONE (OUTPATIENT)
Dept: PODIATRY | Facility: CLINIC | Age: 49
End: 2024-02-12
Payer: MEDICARE

## 2024-02-12 VITALS
HEIGHT: 72 IN | RESPIRATION RATE: 16 BRPM | DIASTOLIC BLOOD PRESSURE: 75 MMHG | BODY MASS INDEX: 25.06 KG/M2 | HEART RATE: 85 BPM | WEIGHT: 185 LBS | SYSTOLIC BLOOD PRESSURE: 119 MMHG

## 2024-02-12 DIAGNOSIS — S93.311A CUBOID SYNDROME OF RIGHT FOOT: Primary | ICD-10-CM

## 2024-02-12 DIAGNOSIS — S93.311A CUBOID SYNDROME OF RIGHT FOOT: ICD-10-CM

## 2024-02-12 PROCEDURE — 73630 X-RAY EXAM OF FOOT: CPT | Mod: 26,RT,, | Performed by: RADIOLOGY

## 2024-02-12 PROCEDURE — 29515 APPLICATION SHORT LEG SPLINT: CPT | Mod: 58,S$PBB,RT, | Performed by: PODIATRIST

## 2024-02-12 PROCEDURE — 99999 PR PBB SHADOW E&M-EST. PATIENT-LVL V: CPT | Mod: PBBFAC,,, | Performed by: PODIATRIST

## 2024-02-12 PROCEDURE — 29515 APPLICATION SHORT LEG SPLINT: CPT | Mod: PBBFAC,RT | Performed by: PODIATRIST

## 2024-02-12 PROCEDURE — 99215 OFFICE O/P EST HI 40 MIN: CPT | Mod: PBBFAC,25 | Performed by: PODIATRIST

## 2024-02-12 PROCEDURE — 73630 X-RAY EXAM OF FOOT: CPT | Mod: TC,RT

## 2024-02-12 PROCEDURE — 99024 POSTOP FOLLOW-UP VISIT: CPT | Mod: POP,,, | Performed by: PODIATRIST

## 2024-02-12 RX ORDER — DICLOFENAC SODIUM 75 MG/1
75 TABLET, DELAYED RELEASE ORAL 2 TIMES DAILY
COMMUNITY
Start: 2024-02-04 | End: 2024-03-18 | Stop reason: SDUPTHER

## 2024-02-13 NOTE — PROGRESS NOTES
Sg Sesay is a 48 y.o. male patient.   1. Cuboid syndrome of right foot      Past Medical History:   Diagnosis Date    Back pain     MS (multiple sclerosis)     Sleep apnea      No past surgical history pertinent negatives on file.  Scheduled Meds:  Continuous Infusions:  PRN Meds:    Review of patient's allergies indicates:   Allergen Reactions    Iodine and iodide containing products Anaphylaxis    Iodine     Shellfish containing products Nausea And Vomiting     Other reaction(s): NAUSEA,VOMITING     There are no hospital problems to display for this patient.    Blood pressure 119/75, pulse 85, resp. rate 16, height 6' (1.829 m), weight 83.9 kg (185 lb).                                                                            Subjective  Objective:  Vital signs (most recent): Blood pressure 119/75, pulse 85, resp. rate 16, height 6' (1.829 m), weight 83.9 kg (185 lb).    Assessment & Plan  Patient presents status post excision hardware resection a portion of the base of the 5th metatarsal right and excision a portion of the cuboid bone right with peroneus brevis tendon transfer right.  Patient states he is doing well he has not having significant pain or discomfort he has maintain a complete nonweightbearing status patient is currently afebrile and in no acute distress the patient's posterior splint was removed.  The area where the patient had a previous blister is a small hematoma it does appear the patient has a hematoma under the incision and surgical site this is something were going to treat conservatively we did apply a new dressing with some compression directly overlying the area of hematoma I am also going to have the patient apply heat to the area and explained to the patient how to do this in an attempt to break down the hematoma I did advised the patient there is a possibility the hematoma it may need to be evacuated from the area surgically I do want see him for follow-up in 1 week and  re-evaluation a new posterior splint was fabricated and applied today follow-up one-week patient to contact us with any problems questions or concerns.  This note was created using [employer] voice recognition software that occasionally misinterpreted phrases or words.   Jah Torres DPM  2/13/2024

## 2024-02-14 ENCOUNTER — TELEPHONE (OUTPATIENT)
Dept: PODIATRY | Facility: CLINIC | Age: 49
End: 2024-02-14
Payer: MEDICARE

## 2024-02-14 RX ORDER — OXYCODONE AND ACETAMINOPHEN 7.5; 325 MG/1; MG/1
1 TABLET ORAL 3 TIMES DAILY
Qty: 42 TABLET | Refills: 0 | Status: SHIPPED | OUTPATIENT
Start: 2024-02-14 | End: 2024-03-04 | Stop reason: SDUPTHER

## 2024-02-19 ENCOUNTER — TELEPHONE (OUTPATIENT)
Dept: PODIATRY | Facility: CLINIC | Age: 49
End: 2024-02-19
Payer: MEDICARE

## 2024-02-19 ENCOUNTER — OFFICE VISIT (OUTPATIENT)
Dept: PODIATRY | Facility: CLINIC | Age: 49
End: 2024-02-19
Payer: MEDICARE

## 2024-02-19 VITALS — HEART RATE: 86 BPM | DIASTOLIC BLOOD PRESSURE: 78 MMHG | TEMPERATURE: 98 F | SYSTOLIC BLOOD PRESSURE: 124 MMHG

## 2024-02-19 DIAGNOSIS — S90.31XA HEMATOMA OF RIGHT FOOT: Primary | ICD-10-CM

## 2024-02-19 PROCEDURE — 99215 OFFICE O/P EST HI 40 MIN: CPT | Mod: PBBFAC | Performed by: PODIATRIST

## 2024-02-19 PROCEDURE — 99999 PR PBB SHADOW E&M-EST. PATIENT-LVL V: CPT | Mod: PBBFAC,,, | Performed by: PODIATRIST

## 2024-02-19 PROCEDURE — 99214 OFFICE O/P EST MOD 30 MIN: CPT | Mod: 24,S$PBB,, | Performed by: PODIATRIST

## 2024-02-19 RX ORDER — SODIUM CHLORIDE, SODIUM LACTATE, POTASSIUM CHLORIDE, CALCIUM CHLORIDE 600; 310; 30; 20 MG/100ML; MG/100ML; MG/100ML; MG/100ML
INJECTION, SOLUTION INTRAVENOUS CONTINUOUS
Status: CANCELLED | OUTPATIENT
Start: 2024-02-23

## 2024-02-19 RX ORDER — SULFAMETHOXAZOLE AND TRIMETHOPRIM 800; 160 MG/1; MG/1
1 TABLET ORAL 2 TIMES DAILY
Qty: 28 TABLET | Refills: 0 | Status: SHIPPED | OUTPATIENT
Start: 2024-02-19 | End: 2024-03-04

## 2024-02-19 NOTE — TELEPHONE ENCOUNTER
Advised patient to stop aspirin and antiinflammatories beginning tomorrow and resume 24 hrs after procedure Friday. Patient verbalized understanding.

## 2024-02-19 NOTE — PROGRESS NOTES
Subjective:       Patient ID: Sg Sesay is a 48 y.o. male.    Chief Complaint: No chief complaint on file.   Patient presents today he is status post bone resection and transferred tendon right he had developed a hematoma overlying the lateral aspect of the right foot has been nonweightbearing in a posterior splint and has been applying heat to the area however he states it really has not gotten any smaller.   Foot Pain  Associated symptoms include joint swelling.   Follow-up  Associated symptoms include joint swelling.   Medication Refill  Associated symptoms include joint swelling.   Toe Pain     Ankle Pain     Foot Injury      Review of Systems   Musculoskeletal:  Positive for back pain and joint swelling.   All other systems reviewed and are negative.      Objective:      Physical Exam  Vitals and nursing note reviewed.   Constitutional:       Appearance: Normal appearance. He is well-developed.   Cardiovascular:      Rate and Rhythm: Normal rate and regular rhythm.      Pulses: Normal pulses.           Dorsalis pedis pulses are 2+ on the right side and 2+ on the left side.        Posterior tibial pulses are 2+ on the right side and 2+ on the left side.      Heart sounds: Normal heart sounds.   Pulmonary:      Effort: Pulmonary effort is normal.      Breath sounds: Normal breath sounds.   Musculoskeletal:         General: Swelling and tenderness present.      Right foot: Deformity present.        Feet:    Feet:      Right foot:      Protective Sensation: 4 sites tested.  4 sites sensed.      Skin integrity: Blister and erythema present.      Left foot:      Protective Sensation: 4 sites tested.  4 sites sensed.   Skin:     General: Skin is warm.      Capillary Refill: Capillary refill takes less than 2 seconds.   Neurological:      General: No focal deficit present.      Mental Status: He is alert.   Psychiatric:         Mood and Affect: Mood normal.         Behavior: Behavior normal.         Thought  Content: Thought content normal.         Judgment: Judgment normal.                                Assessment:       1. Hematoma of right foot          Plan:       Patient presents today he is status post bone resection and transferred tendon right he had developed a hematoma overlying the lateral aspect of the right foot has been nonweightbearing in a posterior splint and has been applying heat to the area however he states it really has not gotten any smaller.  Patient is just over 2 weeks postop the hematoma developed following the initial postoperative visit the areas still soft and supple with limited discomfort however at this point I am concerned that the hematoma is going to prevent the area from healing completely and needs to be evacuated from the surgical site to facilitate complete healing of the area.  Patient was in understanding and agreement with everything discussed he has agreed to pursue evacuation incision and drainage of hematoma as discussed.  Patient had developed a superficial blister which was subsequently drained this area looks good again no signs of infection noted. Pt made the decision to pursue surgery today as recommended. Patient presents today for preoperative history and physical in discussion all aspects of surgery were discussed with the patient no guarantees were given written or implied all potential complications including but not limited to delayed healing nonhealing postoperative pain infection recurrence were discussed with the patient in detail. All aspect of the patient's recovery time involved in recovery patient responsibility is involving recovery were also discussed in detail. Patient advised failure to comply with postoperative care will jeopardize surgical outcome.  All aspects of surgery were discussed at length and in detail patient understands I intend to open up the incision and a small area removing the sutures allowing me to perform an incision and drainage of  the hematoma evacuated the hematoma flushing and irrigating the area before re-suture the area.  Patient is completely nonweightbearing for a period of 6 weeks I advised the patient this is not going to significantly impact his postoperative recovery because he is still 3-1/2 weeks nonweightbearing I feel likely within 3 weeks postop as scheduled he will be able to start bearing weight again in a fracture boot for several weeks before transitioning to a regular shoe and this should not lengthened his recovery period.  A comprehensive preoperative history and physical was performed today patient already has lab work in his chart did not need any additional lab work he understands he will be NPO after midnight prior to scheduled surgery for February 23, 2024.  Patient already has pain medication to take postoperatively I did give him a new prescription for Bactrim today to start taking postoperatively for prophylaxis against infection.  Patient will discontinue aspirin and anti-inflammatories 48 hours prior to surgery.  Patient was not put back in his posterior splint today instead he is going to wear a fracture boot although he will maintain complete nonweightbearing status the surgical site was cleaned there has no signs of infection a new well-padded protective dressing was applied patient will go back into a fracture boot rather than the posterior splint.  Patient is currently using and will continue to use a knee scooter as directed.  Patient will contact us with any problems questions or concerns prior to surgery.  Unrelated evaluation and management additional preoperative history and physical performed today.  Plan procedure is additional and not typically performed postoperatively after the initial procedure that the patient had on the right foot.  This note was created using M*Modal voice recognition software that occasionally misinterpreted phrases or  words.

## 2024-02-20 ENCOUNTER — ANESTHESIA EVENT (OUTPATIENT)
Dept: SURGERY | Facility: HOSPITAL | Age: 49
End: 2024-02-20
Payer: MEDICARE

## 2024-02-20 NOTE — ANESTHESIA PREPROCEDURE EVALUATION
02/20/2024  Sg Sesay is a 48 y.o., male.      Pre-op Assessment    I have reviewed the Patient Summary Reports.     I have reviewed the Nursing Notes. I have reviewed the NPO Status.   I have reviewed the Medications.     Review of Systems  Anesthesia Hx:  No problems with previous Anesthesia             Denies Family Hx of Anesthesia complications.    Denies Personal Hx of Anesthesia complications.                    Social:   Chronic opioids-pain contract  Multiple procedures on foot.  Easy airway      Hematology/Oncology:  Hematology Normal   Oncology Normal                                   EENT/Dental:  EENT/Dental Normal           Cardiovascular:  Cardiovascular Normal                                            Pulmonary:        Sleep Apnea Outside record lists as problem               Renal/:  Renal/ Normal                 Hepatic/GI:  Hepatic/GI Normal                 Musculoskeletal:  Arthritis   DDD            Neurological:    Neuromuscular Disease,       MS-      Chronic Pain Syndrome                         Endocrine:  Endocrine Normal            Dermatological:  Skin Normal    Psych:  Psychiatric Normal                    Physical Exam  General: Well nourished, Cooperative, Alert and Oriented    Airway:  Mallampati: II   Mouth Opening: Normal  TM Distance: Normal  Tongue: Normal  Neck ROM: Normal ROM    Dental:  Intact    Chest/Lungs:  Clear to auscultation, Normal Respiratory Rate    Heart:  Rate: Normal  Rhythm: Regular Rhythm        Anesthesia Plan  Type of Anesthesia, risks & benefits discussed:    Anesthesia Type: Gen Supraglottic Airway  Intra-op Monitoring Plan: Standard ASA Monitors  Post Op Pain Control Plan: multimodal analgesia  Induction:  IV  Informed Consent: Informed consent signed with the Patient and all parties understand the risks and agree with anesthesia plan.   All questions answered.   ASA Score: 2  Day of Surgery Review of History & Physical: H&P Update referred to the surgeon/provider.    Ready For Surgery From Anesthesia Perspective.     .

## 2024-02-23 ENCOUNTER — ANESTHESIA (OUTPATIENT)
Dept: SURGERY | Facility: HOSPITAL | Age: 49
End: 2024-02-23
Payer: MEDICARE

## 2024-02-23 ENCOUNTER — TELEPHONE (OUTPATIENT)
Dept: PODIATRY | Facility: CLINIC | Age: 49
End: 2024-02-23

## 2024-02-23 ENCOUNTER — HOSPITAL ENCOUNTER (OUTPATIENT)
Facility: HOSPITAL | Age: 49
Discharge: HOME | End: 2024-02-23
Attending: PODIATRIST | Admitting: PODIATRIST
Payer: MEDICARE

## 2024-02-23 VITALS
HEIGHT: 72 IN | WEIGHT: 185 LBS | BODY MASS INDEX: 25.06 KG/M2 | TEMPERATURE: 98 F | DIASTOLIC BLOOD PRESSURE: 80 MMHG | HEART RATE: 82 BPM | RESPIRATION RATE: 18 BRPM | SYSTOLIC BLOOD PRESSURE: 122 MMHG | OXYGEN SATURATION: 96 %

## 2024-02-23 DIAGNOSIS — S90.31XA HEMATOMA OF RIGHT FOOT: Primary | ICD-10-CM

## 2024-02-23 PROCEDURE — 36000705 HC OR TIME LEV I EA ADD 15 MIN: Performed by: PODIATRIST

## 2024-02-23 PROCEDURE — 25000003 PHARM REV CODE 250: Performed by: PODIATRIST

## 2024-02-23 PROCEDURE — 63600175 PHARM REV CODE 636 W HCPCS: Mod: JZ,JG | Performed by: PODIATRIST

## 2024-02-23 PROCEDURE — 63600175 PHARM REV CODE 636 W HCPCS: Performed by: PODIATRIST

## 2024-02-23 PROCEDURE — 36000704 HC OR TIME LEV I 1ST 15 MIN: Performed by: PODIATRIST

## 2024-02-23 PROCEDURE — 37000008 HC ANESTHESIA 1ST 15 MINUTES: Performed by: PODIATRIST

## 2024-02-23 PROCEDURE — 71000015 HC POSTOP RECOV 1ST HR: Performed by: PODIATRIST

## 2024-02-23 PROCEDURE — 28002 TREATMENT OF FOOT INFECTION: CPT | Mod: 79,RT,, | Performed by: PODIATRIST

## 2024-02-23 PROCEDURE — 63600175 PHARM REV CODE 636 W HCPCS: Performed by: NURSE ANESTHETIST, CERTIFIED REGISTERED

## 2024-02-23 PROCEDURE — D9220A PRA ANESTHESIA: Mod: ANES,,, | Performed by: ANESTHESIOLOGY

## 2024-02-23 PROCEDURE — 37000009 HC ANESTHESIA EA ADD 15 MINS: Performed by: PODIATRIST

## 2024-02-23 PROCEDURE — D9220A PRA ANESTHESIA: Mod: CRNA,,, | Performed by: NURSE ANESTHETIST, CERTIFIED REGISTERED

## 2024-02-23 PROCEDURE — 71000033 HC RECOVERY, INTIAL HOUR: Performed by: PODIATRIST

## 2024-02-23 PROCEDURE — 25000003 PHARM REV CODE 250: Performed by: NURSE ANESTHETIST, CERTIFIED REGISTERED

## 2024-02-23 RX ORDER — LIDOCAINE HYDROCHLORIDE 20 MG/ML
INJECTION, SOLUTION EPIDURAL; INFILTRATION; INTRACAUDAL; PERINEURAL
Status: DISCONTINUED | OUTPATIENT
Start: 2024-02-23 | End: 2024-02-23

## 2024-02-23 RX ORDER — FENTANYL CITRATE 50 UG/ML
INJECTION, SOLUTION INTRAMUSCULAR; INTRAVENOUS
Status: DISCONTINUED | OUTPATIENT
Start: 2024-02-23 | End: 2024-02-23

## 2024-02-23 RX ORDER — SODIUM CHLORIDE, SODIUM LACTATE, POTASSIUM CHLORIDE, CALCIUM CHLORIDE 600; 310; 30; 20 MG/100ML; MG/100ML; MG/100ML; MG/100ML
INJECTION, SOLUTION INTRAVENOUS CONTINUOUS
Status: DISCONTINUED | OUTPATIENT
Start: 2024-02-23 | End: 2024-02-23 | Stop reason: HOSPADM

## 2024-02-23 RX ORDER — BUPIVACAINE HYDROCHLORIDE 5 MG/ML
INJECTION, SOLUTION EPIDURAL; INTRACAUDAL
Status: DISCONTINUED | OUTPATIENT
Start: 2024-02-23 | End: 2024-02-23 | Stop reason: HOSPADM

## 2024-02-23 RX ORDER — PROPOFOL 10 MG/ML
VIAL (ML) INTRAVENOUS
Status: DISCONTINUED | OUTPATIENT
Start: 2024-02-23 | End: 2024-02-23

## 2024-02-23 RX ORDER — HYDROMORPHONE HYDROCHLORIDE 1 MG/ML
0.5 INJECTION, SOLUTION INTRAMUSCULAR; INTRAVENOUS; SUBCUTANEOUS EVERY 5 MIN PRN
Status: DISCONTINUED | OUTPATIENT
Start: 2024-02-23 | End: 2024-02-23 | Stop reason: HOSPADM

## 2024-02-23 RX ORDER — DEXAMETHASONE SODIUM PHOSPHATE 4 MG/ML
INJECTION, SOLUTION INTRA-ARTICULAR; INTRALESIONAL; INTRAMUSCULAR; INTRAVENOUS; SOFT TISSUE
Status: DISCONTINUED | OUTPATIENT
Start: 2024-02-23 | End: 2024-02-23

## 2024-02-23 RX ORDER — SODIUM CHLORIDE, SODIUM LACTATE, POTASSIUM CHLORIDE, CALCIUM CHLORIDE 600; 310; 30; 20 MG/100ML; MG/100ML; MG/100ML; MG/100ML
125 INJECTION, SOLUTION INTRAVENOUS CONTINUOUS
Status: DISCONTINUED | OUTPATIENT
Start: 2024-02-23 | End: 2024-02-23 | Stop reason: HOSPADM

## 2024-02-23 RX ORDER — MIDAZOLAM HYDROCHLORIDE 1 MG/ML
INJECTION INTRAMUSCULAR; INTRAVENOUS
Status: DISCONTINUED | OUTPATIENT
Start: 2024-02-23 | End: 2024-02-23

## 2024-02-23 RX ORDER — MEPERIDINE HYDROCHLORIDE 50 MG/ML
12.5 INJECTION INTRAMUSCULAR; INTRAVENOUS; SUBCUTANEOUS EVERY 10 MIN PRN
Status: DISCONTINUED | OUTPATIENT
Start: 2024-02-23 | End: 2024-02-23 | Stop reason: HOSPADM

## 2024-02-23 RX ORDER — LIDOCAINE HYDROCHLORIDE 10 MG/ML
INJECTION INFILTRATION; PERINEURAL
Status: DISCONTINUED | OUTPATIENT
Start: 2024-02-23 | End: 2024-02-23 | Stop reason: HOSPADM

## 2024-02-23 RX ORDER — OXYCODONE HYDROCHLORIDE 5 MG/1
5 TABLET ORAL ONCE AS NEEDED
Status: DISCONTINUED | OUTPATIENT
Start: 2024-02-23 | End: 2024-02-23 | Stop reason: HOSPADM

## 2024-02-23 RX ORDER — ACETAMINOPHEN 10 MG/ML
INJECTION, SOLUTION INTRAVENOUS
Status: DISCONTINUED | OUTPATIENT
Start: 2024-02-23 | End: 2024-02-23

## 2024-02-23 RX ORDER — LIDOCAINE HYDROCHLORIDE 10 MG/ML
1 INJECTION, SOLUTION EPIDURAL; INFILTRATION; INTRACAUDAL; PERINEURAL ONCE
Status: DISCONTINUED | OUTPATIENT
Start: 2024-02-23 | End: 2024-02-23 | Stop reason: HOSPADM

## 2024-02-23 RX ORDER — ONDANSETRON HYDROCHLORIDE 2 MG/ML
INJECTION, SOLUTION INTRAVENOUS
Status: DISCONTINUED | OUTPATIENT
Start: 2024-02-23 | End: 2024-02-23

## 2024-02-23 RX ORDER — ONDANSETRON HYDROCHLORIDE 2 MG/ML
4 INJECTION, SOLUTION INTRAVENOUS DAILY PRN
Status: DISCONTINUED | OUTPATIENT
Start: 2024-02-23 | End: 2024-02-23 | Stop reason: HOSPADM

## 2024-02-23 RX ADMIN — PROPOFOL 200 MG: 10 INJECTION, EMULSION INTRAVENOUS at 07:02

## 2024-02-23 RX ADMIN — ACETAMINOPHEN 1000 MG: 10 INJECTION INTRAVENOUS at 07:02

## 2024-02-23 RX ADMIN — ONDANSETRON 8 MG: 2 INJECTION INTRAMUSCULAR; INTRAVENOUS at 07:02

## 2024-02-23 RX ADMIN — FENTANYL CITRATE 50 MCG: 50 INJECTION, SOLUTION INTRAMUSCULAR; INTRAVENOUS at 07:02

## 2024-02-23 RX ADMIN — SODIUM CHLORIDE, POTASSIUM CHLORIDE, SODIUM LACTATE AND CALCIUM CHLORIDE: 600; 310; 30; 20 INJECTION, SOLUTION INTRAVENOUS at 07:02

## 2024-02-23 RX ADMIN — CEFAZOLIN 2 G: 2 INJECTION, POWDER, FOR SOLUTION INTRAMUSCULAR; INTRAVENOUS at 07:02

## 2024-02-23 RX ADMIN — MIDAZOLAM HYDROCHLORIDE 2 MG: 1 INJECTION, SOLUTION INTRAMUSCULAR; INTRAVENOUS at 07:02

## 2024-02-23 RX ADMIN — LIDOCAINE HYDROCHLORIDE 100 MG: 20 INJECTION, SOLUTION EPIDURAL; INFILTRATION; INTRACAUDAL; PERINEURAL at 07:02

## 2024-02-23 RX ADMIN — DEXAMETHASONE SODIUM PHOSPHATE 4 MG: 4 INJECTION, SOLUTION INTRAMUSCULAR; INTRAVENOUS at 07:02

## 2024-02-23 NOTE — DISCHARGE SUMMARY
[facility], [place], operated by [facility]  Discharge Note  Short Stay    Procedure(s) (LRB):  INCISION AND DRAINAGE, HEMATOMA (Right)      OUTCOME: Patient tolerated treatment/procedure well without complication and is now ready for discharge.    DISPOSITION: Home or Self Care    FINAL DIAGNOSIS:  <principal problem not specified>    FOLLOWUP: In clinic    DISCHARGE INSTRUCTIONS:    Discharge Procedure Orders   Lifting restrictions     Ice to affected area     Keep surgical extremity elevated     Weight bearing restrictions (specify):        TIME SPENT ON DISCHARGE: 3 minutes

## 2024-02-23 NOTE — TRANSFER OF CARE
Anesthesia Transfer of Care Note    Patient: Sg Sesay    Procedure(s) Performed: Procedure(s) (LRB):  INCISION AND DRAINAGE, HEMATOMA (Right)    Patient location: PACU    Anesthesia Type: general    Transport from OR: Transported from OR on room air with adequate spontaneous ventilation    Post pain: adequate analgesia    Post assessment: no apparent anesthetic complications and tolerated procedure well    Post vital signs: stable    Level of consciousness: sedated and responds to stimulation    Nausea/Vomiting: no nausea/vomiting    Complications: none    Transfer of care protocol was followed      Last vitals: Visit Vitals  BP (!) 140/99   Pulse 75   Temp 36.8 °C (98.2 °F) (Tympanic)   Resp 18   Ht 6' (1.829 m)   Wt 83.9 kg (185 lb)   SpO2 97%   BMI 25.09 kg/m²

## 2024-02-23 NOTE — PLAN OF CARE
Patients mom at bedside. Dr. Torres has already come to bedside to speak to patient.   Pain controlled and no complaints at this time.

## 2024-02-23 NOTE — TELEPHONE ENCOUNTER
Patient advised to ice/elevate. Bring boot to appointment Monday. Patient verbalized understanding.

## 2024-02-23 NOTE — ANESTHESIA POSTPROCEDURE EVALUATION
Anesthesia Post Evaluation    Patient: Sg Sesay    Procedure(s) Performed: Procedure(s) (LRB):  INCISION AND DRAINAGE, HEMATOMA (Right)    Final Anesthesia Type: general      Patient location during evaluation: PACU  Patient participation: Yes- Able to Participate  Level of consciousness: awake and alert and oriented  Post-procedure vital signs: reviewed and stable  Pain management: adequate  Airway patency: patent    PONV status at discharge: No PONV  Anesthetic complications: no      Cardiovascular status: hemodynamically stable  Respiratory status: unassisted, spontaneous ventilation and room air  Hydration status: euvolemic  Follow-up not needed.              Vitals Value Taken Time   /80 02/23/24 0917   Temp 36.4 °C (97.5 °F) 02/23/24 0833   Pulse 82 02/23/24 0900   Resp 18 02/23/24 0900   SpO2 96 % 02/23/24 0929   Vitals shown include unvalidated device data.      Event Time   Out of Recovery 09:30:00         Pain/Mariano Score: Mariano Score: 10 (2/23/2024  9:15 AM)  Modified Mariano Score: 19 (2/23/2024  9:15 AM)

## 2024-02-23 NOTE — OP NOTE
[facility] - Surgery  Operative Note     SUMMARY     Surgery Date: 2/23/2024       Pre-op Diagnosis:  Hematoma of right foot [S90.31XA]    Post-op Diagnosis:  Post-Op Diagnosis Codes:     * Hematoma of right foot [S90.31XA]    Procedure(s) (LRB):  INCISION AND DRAINAGE, HEMATOMA (Right)  Incision and drainage hematoma right foot procedure code 57591  Surgeon(s) and Role:     * Jah Torres DPM - Primary      Estimated Blood Loss:  Less than 5 cc  Hemostasis:  Ankle tourniquet placed at 250 mmHg for a period of 20 minutes    Anesthesia:  LMA in conjunction with local infiltrate equaling 20 cc of 1% lidocaine plain  Injectables 30 cc of 0.5% Marcaine plain  Materials sterile irrigant 2.0 nylon  Pathology none  Condition stable  Complications none      Description of the findings of the procedure:  Hematoma of right foot [S90.31XA]         Specimens (From admission, onward)      None             Procedure:  Patient was taken the operating room placed in supine position on the OR table attention was then directed towards the patient's right ankle where Webril cast padding was placed on the patient's right ankle as was an ankle tourniquet.  Following this patient was locally anesthetized in a regional block of the lateral portion of the right foot utilizing a total of 20 cc of 1% lidocaine plain.  Following this the patient's foot was prepped and draped in the usual sterile manner.  Patient's foot was then exsanguinated utilizing an Esmarch bandage the ankle tourniquet was raised to 250 mmHg.  Patient had a palpable hematoma underlying a previous incision from a proximally 3 weeks ago this developed 10-14 days after the initial procedure and appears to be impeding the healing process.  The hematoma itself was soft and supple at the plantar portion of the incision from previous surgery lateral right foot sutures were removed at the distal portion of the incision site a proximally 3 cm long this allowed for access to  the hematoma which was evacuated from the surgical site this did extend all the way through the incision to the plantar aspect of the patient's right foot the area was suctioned out debrided and the hematoma was removed in toto a pulse lavage was used to flush and irrigate the surgical site no remaining hematoma was present within the site itself and the previously noted fluid collection had completely resolved the edges of the skin that had been opened at the incision site were freshened up utilizing a 15. Scalpel and primary closure was achieved with 2.0 nylon in a simple interrupted fashion.  Patient was then injected with additional 30 cc of 0.5% Marcaine plain to create a long-term postoperative anesthetic.  Ankle tourniquet was lowered no significant bleeding noted.  Xeroform was placed over the incision there was an area of breakdown lateral and plantar to the incision.  This area of breakdown was superficial where the patient had developed a blister this was also debrided superficially non excisionally after completely debriding and and cleaning the area with sterile saline solution the area was thoroughly dried Xeroform was applied overlying the incision as well as this area of breakdown and a well-padded thick protective dressing was applied with compression.  Compression was applied to prevent any reoccurrence of hematoma at the surgical site.  Sterile 4x4s ABD multiple rolls of Kerlix and 2 Ace wraps were used with a moderate compression overlying the surgical site.  Patient left the operating room with vital signs stable and vascular status having return to the patient's preoperative levels.  Patient will continue to maintain his nonweightbearing status for the next 3 weeks.This note was created using M*Modal voice recognition software that occasionally misinterpreted phrases or words.

## 2024-02-26 ENCOUNTER — OFFICE VISIT (OUTPATIENT)
Dept: PODIATRY | Facility: CLINIC | Age: 49
End: 2024-02-26
Payer: MEDICARE

## 2024-02-26 VITALS
DIASTOLIC BLOOD PRESSURE: 78 MMHG | SYSTOLIC BLOOD PRESSURE: 127 MMHG | WEIGHT: 185 LBS | HEIGHT: 72 IN | BODY MASS INDEX: 25.06 KG/M2 | HEART RATE: 76 BPM

## 2024-02-26 DIAGNOSIS — S90.31XA HEMATOMA OF RIGHT FOOT: Primary | ICD-10-CM

## 2024-02-26 PROCEDURE — 99999 PR PBB SHADOW E&M-EST. PATIENT-LVL IV: CPT | Mod: PBBFAC,,, | Performed by: PODIATRIST

## 2024-02-26 PROCEDURE — 99214 OFFICE O/P EST MOD 30 MIN: CPT | Mod: PBBFAC | Performed by: PODIATRIST

## 2024-02-26 PROCEDURE — 99024 POSTOP FOLLOW-UP VISIT: CPT | Mod: POP,,, | Performed by: PODIATRIST

## 2024-02-28 NOTE — PROGRESS NOTES
Sg Sesay is a 48 y.o. male patient.   1. Hematoma of right foot      Past Medical History:   Diagnosis Date    Back pain     MS (multiple sclerosis)     Sleep apnea      No past surgical history pertinent negatives on file.  Scheduled Meds:  Continuous Infusions:  PRN Meds:    Review of patient's allergies indicates:   Allergen Reactions    Iodine and iodide containing products Anaphylaxis    Iodine     Shellfish containing products Nausea And Vomiting     Other reaction(s): NAUSEA,VOMITING     There are no hospital problems to display for this patient.    Blood pressure 127/78, pulse 76, height 6' (1.829 m), weight 83.9 kg (185 lb).                  Subjective  Objective  Assessment & Plan  Patient presents status post incision and drainage of hematoma lateral right foot.  Patient states he is doing great he did not have any significant pain or discomfort upon seeing the patient's foot when the dressing was removed patient states it looks great it feels great he is very happy with the outcome of his foot following removal of the hematoma.  The area is healing very well there has no drainage no active bleeding all the previously noted swelling has completely resolved the incision remains well healed and well coapted.  I did apply Xeroform directly to the incision as well as the blister site which is also continuing to heal very well a light padded dressing was applied with compression utilizing an Ace wrap patient is going to go back into his fracture boot but he will maintain complete nonweightbearing status as directed follow-up one-week.This note was created using [email or web address] voice recognition software that occasionally misinterpreted phrases or words.   Jah Torres DPM  2/27/2024

## 2024-03-04 ENCOUNTER — OFFICE VISIT (OUTPATIENT)
Dept: PODIATRY | Facility: CLINIC | Age: 49
End: 2024-03-04
Payer: MEDICARE

## 2024-03-04 VITALS
HEIGHT: 72 IN | SYSTOLIC BLOOD PRESSURE: 136 MMHG | BODY MASS INDEX: 25.06 KG/M2 | DIASTOLIC BLOOD PRESSURE: 87 MMHG | HEART RATE: 78 BPM | WEIGHT: 185 LBS | RESPIRATION RATE: 18 BRPM

## 2024-03-04 DIAGNOSIS — S93.311A CUBOID SYNDROME OF RIGHT FOOT: Primary | ICD-10-CM

## 2024-03-04 PROCEDURE — 99999 PR PBB SHADOW E&M-EST. PATIENT-LVL IV: CPT | Mod: PBBFAC,,, | Performed by: PODIATRIST

## 2024-03-04 PROCEDURE — 99214 OFFICE O/P EST MOD 30 MIN: CPT | Mod: PBBFAC | Performed by: PODIATRIST

## 2024-03-04 PROCEDURE — 99024 POSTOP FOLLOW-UP VISIT: CPT | Mod: POP,,, | Performed by: PODIATRIST

## 2024-03-04 RX ORDER — OXYCODONE AND ACETAMINOPHEN 7.5; 325 MG/1; MG/1
1 TABLET ORAL 3 TIMES DAILY
Qty: 42 TABLET | Refills: 0 | Status: SHIPPED | OUTPATIENT
Start: 2024-03-04 | End: 2024-03-18

## 2024-03-04 NOTE — PROGRESS NOTES
Sg Sesay is a 48 y.o. male patient.   1. Cuboid syndrome of right foot      Past Medical History:   Diagnosis Date    Back pain     MS (multiple sclerosis)     Sleep apnea      No past surgical history pertinent negatives on file.  Scheduled Meds:  Continuous Infusions:  PRN Meds:    Review of patient's allergies indicates:   Allergen Reactions    Iodine and iodide containing products Anaphylaxis    Iodine     Shellfish containing products Nausea And Vomiting     Other reaction(s): NAUSEA,VOMITING     There are no hospital problems to display for this patient.    Blood pressure 136/87, pulse 78, resp. rate 18, height 6' (1.829 m), weight 83.9 kg (185 lb).                                    Subjective  Objective:  Vital signs (most recent): Blood pressure 136/87, pulse 78, resp. rate 18, height 6' (1.829 m), weight 83.9 kg (185 lb).    Assessment & Plan  Patient presents status post incision and drainage of hematoma lateral right foot.  Patient states he is doing great he did not have any significant pain or discomfort upon seeing the patient's foot when the dressing was removed patient states it looks great it feels great he is very happy with the outcome of his foot following removal of the hematoma.  Patient states he can not believe how good his foot looks it actually looks normal.  The area is healing very well there has no drainage no active bleeding all the previously noted swelling has completely resolved the incision remains well healed and well coapted.  I did apply Xeroform directly to the incision as well as the blister site which is also continuing to heal very well a light padded dressing was applied with compression utilizing an Ace wrap patient is going to go back into his fracture boot but he will maintain complete nonweightbearing status as directed follow-up 2 weeks.  Patient was advised I anticipate allowing him to begin to bear weight in his fracture boot when I follow up with him in 2  weeks I will likely remove his sutures and he should be able to start getting the area wet.  I did renew the patient's prescription for Percocet.  Patient advised to contact us with any problems questions or concerns will change his dressing next week on his own he was dispensed dressing supplies to do this.  This note was created using [employer] voice recognition software that occasionally misinterpreted phrases or words.   Jah Torres DPM  3/4/2024

## 2024-03-18 ENCOUNTER — OFFICE VISIT (OUTPATIENT)
Dept: PODIATRY | Facility: CLINIC | Age: 49
End: 2024-03-18
Payer: MEDICARE

## 2024-03-18 VITALS
BODY MASS INDEX: 25.05 KG/M2 | DIASTOLIC BLOOD PRESSURE: 84 MMHG | HEART RATE: 84 BPM | WEIGHT: 184.94 LBS | SYSTOLIC BLOOD PRESSURE: 147 MMHG | HEIGHT: 72 IN

## 2024-03-18 DIAGNOSIS — S93.311A CUBOID SYNDROME OF RIGHT FOOT: Primary | ICD-10-CM

## 2024-03-18 PROCEDURE — 99214 OFFICE O/P EST MOD 30 MIN: CPT | Mod: PBBFAC | Performed by: PODIATRIST

## 2024-03-18 PROCEDURE — 99999 PR PBB SHADOW E&M-EST. PATIENT-LVL IV: CPT | Mod: PBBFAC,,, | Performed by: PODIATRIST

## 2024-03-18 PROCEDURE — 99024 POSTOP FOLLOW-UP VISIT: CPT | Mod: POP,,, | Performed by: PODIATRIST

## 2024-03-18 RX ORDER — DICLOFENAC SODIUM 75 MG/1
75 TABLET, DELAYED RELEASE ORAL 2 TIMES DAILY
Qty: 60 TABLET | Refills: 3 | Status: SHIPPED | OUTPATIENT
Start: 2024-03-18 | End: 2024-07-16

## 2024-03-19 NOTE — PROGRESS NOTES
Sg Sesay is a 48 y.o. male patient.   1. Cuboid syndrome of right foot      Past Medical History:   Diagnosis Date    Back pain     MS (multiple sclerosis)     Sleep apnea      No past surgical history pertinent negatives on file.  Scheduled Meds:  Continuous Infusions:  PRN Meds:    Review of patient's allergies indicates:   Allergen Reactions    Iodine and iodide containing products Anaphylaxis    Iodine     Shellfish containing products Nausea And Vomiting     Other reaction(s): NAUSEA,VOMITING     There are no hospital problems to display for this patient.    Blood pressure (!) 147/84, pulse 84, height 6' (1.829 m), weight 83.9 kg (184 lb 15.5 oz).                                                              Subjective  Objective:  Vital signs (most recent): Blood pressure (!) 147/84, pulse 84, height 6' (1.829 m), weight 83.9 kg (184 lb 15.5 oz).    Assessment & Plan  Patient presents status post incision and drainage of hematoma lateral right foot. Patient states he is doing well he has not having any pain or discomfort I am going to allow the patient to start to bear weight with the fracture boot I removed his sutures today the incision split open at the very edges this appears to be superficial I am going to have the patient apply Betadine to the area every day to make sure it stays dried out does not become too moist is going to keep a light dressing on the area I do not want the patient getting the area wet just yet I do plan to follow up with him in 1 week for re-evaluation I advised the patient not to overdo it he does need to ice and elevate to combat any inflammation and swelling.  Patient looks good he understands that he is going to have some inflammation some swelling some discomfort with weight-bearing but overall I feel he is improving very well and progressing well.  This note was created using [employer] voice recognition software that occasionally misinterpreted phrases or words.    Jah Torres, LISANDRO  3/19/2024

## 2024-03-25 ENCOUNTER — OFFICE VISIT (OUTPATIENT)
Dept: PODIATRY | Facility: CLINIC | Age: 49
End: 2024-03-25
Payer: MEDICARE

## 2024-03-25 VITALS — WEIGHT: 194 LBS | BODY MASS INDEX: 26.28 KG/M2 | HEIGHT: 72 IN

## 2024-03-25 DIAGNOSIS — S90.31XA HEMATOMA OF RIGHT FOOT: Primary | ICD-10-CM

## 2024-03-25 PROCEDURE — 99213 OFFICE O/P EST LOW 20 MIN: CPT | Mod: PBBFAC | Performed by: PODIATRIST

## 2024-03-25 PROCEDURE — 99024 POSTOP FOLLOW-UP VISIT: CPT | Mod: POP,,, | Performed by: PODIATRIST

## 2024-03-25 PROCEDURE — 99999 PR PBB SHADOW E&M-EST. PATIENT-LVL III: CPT | Mod: PBBFAC,,, | Performed by: PODIATRIST

## 2024-03-26 ENCOUNTER — PATIENT MESSAGE (OUTPATIENT)
Dept: PSYCHIATRY | Facility: CLINIC | Age: 49
End: 2024-03-26
Payer: MEDICARE

## 2024-03-26 RX ORDER — PREGABALIN 200 MG/1
200 CAPSULE ORAL 3 TIMES DAILY
Qty: 90 CAPSULE | Refills: 5 | Status: SHIPPED | OUTPATIENT
Start: 2024-03-26 | End: 2024-05-15 | Stop reason: SDUPTHER

## 2024-03-26 NOTE — PROGRESS NOTES
Sg Sesay is a 48 y.o. male patient.   1. Hematoma of right foot      Past Medical History:   Diagnosis Date    Back pain     MS (multiple sclerosis)     Sleep apnea      No past surgical history pertinent negatives on file.  Scheduled Meds:  Continuous Infusions:  PRN Meds:    Review of patient's allergies indicates:   Allergen Reactions    Iodine and iodide containing products Anaphylaxis    Iodine     Shellfish containing products Nausea And Vomiting     Other reaction(s): NAUSEA,VOMITING     There are no hospital problems to display for this patient.    Height 6' (1.829 m), weight 88 kg (194 lb).                                                                            Subjective  Objective:  Vital signs (most recent): Height 6' (1.829 m), weight 88 kg (194 lb).    Assessment & Plan  Patient presents status post incision and drainage of hematoma lateral right foot.  Patient is now weight-bearing in a regular shoe he states he has not really having pain that is all that bad the area looks lot better the incision had split open a little bit it does appear to be relatively superficial the patient's been cleaning with Dakin's applying Betadine to the area I am going to have him continue to do this every day I have also put Steri-Strips over the area with some compression to help pull the edges together patient was dispensed Steri-Strips and he will apply new Steri-Strips daily he will continue wearing a regular shoe he does have some swelling that is consistent with his current postoperative status as he breaks down scar tissue but overall I feel the area looks very good and the patient will progress.  Plan follow-up will be 2 weeks unless the patient has any problems questions or concerns sooner.  This note was created using [employer] voice recognition software that occasionally misinterpreted phrases or words.   Jah Torres DPM  3/26/2024

## 2024-03-26 NOTE — TELEPHONE ENCOUNTER
----- Message from Mary Spalding sent at 3/26/2024  3:55 PM CDT -----  Contact: self  Type:  Needs Medical Advice    Who Called: self  Symptoms (please be specific): needs a refill on pregabalin (LYRICA) 200 MG Cap. Rx was going to wrong phar previously.     Pharmacy name and phone #:   [id number] - [place], [place] - [address]  [address]  [place] [address]  Phone: [phone] Fax: [phone]        Would the patient rather a call back or a response via [facility]? call  Best Call Back Number: [phone] (home)     Additional Information: please advise and thank you.

## 2024-04-08 ENCOUNTER — OFFICE VISIT (OUTPATIENT)
Dept: PODIATRY | Facility: CLINIC | Age: 49
End: 2024-04-08
Payer: MEDICARE

## 2024-04-08 ENCOUNTER — HOSPITAL ENCOUNTER (OUTPATIENT)
Dept: RADIOLOGY | Facility: HOSPITAL | Age: 49
Discharge: HOME | End: 2024-04-08
Attending: PODIATRIST
Payer: MEDICARE

## 2024-04-08 VITALS
DIASTOLIC BLOOD PRESSURE: 108 MMHG | WEIGHT: 194 LBS | SYSTOLIC BLOOD PRESSURE: 158 MMHG | HEIGHT: 72 IN | BODY MASS INDEX: 26.28 KG/M2 | HEART RATE: 79 BPM

## 2024-04-08 DIAGNOSIS — S93.311A CUBOID SYNDROME OF RIGHT FOOT: Primary | ICD-10-CM

## 2024-04-08 DIAGNOSIS — M79.2 NEURITIS: ICD-10-CM

## 2024-04-08 DIAGNOSIS — S93.311A CUBOID SYNDROME OF RIGHT FOOT: ICD-10-CM

## 2024-04-08 DIAGNOSIS — S91.301A WOUND OF RIGHT FOOT: ICD-10-CM

## 2024-04-08 PROCEDURE — 73630 X-RAY EXAM OF FOOT: CPT | Mod: TC,RT

## 2024-04-08 PROCEDURE — 87070 CULTURE OTHR SPECIMN AEROBIC: CPT | Performed by: PODIATRIST

## 2024-04-08 PROCEDURE — 73630 X-RAY EXAM OF FOOT: CPT | Mod: 26,RT,, | Performed by: RADIOLOGY

## 2024-04-08 PROCEDURE — 99213 OFFICE O/P EST LOW 20 MIN: CPT | Mod: 24,S$PBB,, | Performed by: PODIATRIST

## 2024-04-08 PROCEDURE — 99999 PR PBB SHADOW E&M-EST. PATIENT-LVL IV: CPT | Mod: PBBFAC,,, | Performed by: PODIATRIST

## 2024-04-08 PROCEDURE — 99214 OFFICE O/P EST MOD 30 MIN: CPT | Mod: PBBFAC,25 | Performed by: PODIATRIST

## 2024-04-08 RX ORDER — OXYCODONE AND ACETAMINOPHEN 10; 325 MG/1; MG/1
1 TABLET ORAL 3 TIMES DAILY
Qty: 90 TABLET | Refills: 0 | Status: SHIPPED | OUTPATIENT
Start: 2024-04-08 | End: 2024-05-06 | Stop reason: SDUPTHER

## 2024-04-09 PROBLEM — S91.301A WOUND OF RIGHT FOOT: Status: ACTIVE | Noted: 2021-10-20

## 2024-04-09 NOTE — PROGRESS NOTES
Sg Sesay is a 48 y.o. male patient.   1. Cuboid syndrome of right foot    2. Wound of right foot    3. Neuritis      Past Medical History:   Diagnosis Date    Back pain     MS (multiple sclerosis)     Sleep apnea      No past surgical history pertinent negatives on file.  Scheduled Meds:  Continuous Infusions:  PRN Meds:    Review of patient's allergies indicates:   Allergen Reactions    Iodine and iodide containing products Anaphylaxis    Iodine     Shellfish containing products Nausea And Vomiting     Other reaction(s): NAUSEA,VOMITING     There are no hospital problems to display for this patient.    Blood pressure (!) 158/108, pulse 79, height 6' (1.829 m), weight 88 kg (194 lb 0.1 oz).                                                                                          Subjective  Objective:  Vital signs (most recent): Blood pressure (!) 158/108, pulse 79, height 6' (1.829 m), weight 88 kg (194 lb 0.1 oz).    Assessment & Plan  Patient presents today for follow-up he previously had a hematoma removed from the lateral aspect of the right foot while the area healed very well the incision did split open requiring unrelated evaluation and management and additional wound care.  Patient is also under my care for pain management he current has a current pain management contract on file.  Patient states the wound site has not been draining very much he does have some residual swelling in the area there is some very mild drainage I did perform a culture and sensitivity on the area as a precaution I will place the patient on appropriate antibiotics pending culture and sensitivity if necessary in the meantime patient will do a dressing change every day the wound itself is a proximally 4 cm long by 5 mm wide by 3 mm deep there is minimal depth and there has no tracking noted.  Patient will clean with Dakin solution gently pack iodoform packing strips overlying the area with a light dressing patient can wear  regular shoe as tolerated he is not to get the area wet.  X-rays taken today everything looks very good patient maintains good anatomic alignment and surgical correction.  I did advised the patient this is likely going to stay inflamed until it is completely closed resolved and healed I did give him a prescription for pain medication today per his pain management contract.  Patient will be seen for follow-up in 1 week he has been advised to contact us with any problems questions or concerns he will discontinue Betadine to the area and Steri-Strips.  This note was created using [employer] voice recognition software that occasionally misinterpreted phrases or words.   Jah Torres DPM  4/9/2024

## 2024-04-11 ENCOUNTER — TELEPHONE (OUTPATIENT)
Dept: PODIATRY | Facility: CLINIC | Age: 49
End: 2024-04-11
Payer: MEDICARE

## 2024-04-11 LAB — BACTERIA SPEC AEROBE CULT: NORMAL

## 2024-04-11 NOTE — TELEPHONE ENCOUNTER
----- Message from Jah Torres DPM sent at 4/11/2024 10:24 AM CDT -----  Please call the patient and advise his culture only displayed normal skin bacteria there was no need for an oral antibiotic.  ----- Message -----  From: [facility]  Sent: 4/10/2024   2:11 PM CDT  To: Jah Torres DPM

## 2024-04-22 ENCOUNTER — OFFICE VISIT (OUTPATIENT)
Dept: PODIATRY | Facility: CLINIC | Age: 49
End: 2024-04-22
Payer: MEDICARE

## 2024-04-22 VITALS
HEART RATE: 69 BPM | HEIGHT: 72 IN | SYSTOLIC BLOOD PRESSURE: 139 MMHG | BODY MASS INDEX: 26.28 KG/M2 | DIASTOLIC BLOOD PRESSURE: 88 MMHG | WEIGHT: 194 LBS

## 2024-04-22 DIAGNOSIS — S91.301A WOUND OF RIGHT FOOT: Primary | ICD-10-CM

## 2024-04-22 PROCEDURE — 99999 PR PBB SHADOW E&M-EST. PATIENT-LVL IV: CPT | Mod: PBBFAC,,, | Performed by: PODIATRIST

## 2024-04-22 PROCEDURE — 99213 OFFICE O/P EST LOW 20 MIN: CPT | Mod: 24,S$PBB,, | Performed by: PODIATRIST

## 2024-04-22 PROCEDURE — 99214 OFFICE O/P EST MOD 30 MIN: CPT | Mod: PBBFAC | Performed by: PODIATRIST

## 2024-04-23 NOTE — PROGRESS NOTES
Sg Sesay is a 48 y.o. male patient.   No diagnosis found.    Past Medical History:   Diagnosis Date    Back pain     MS (multiple sclerosis)     Sleep apnea      No past surgical history pertinent negatives on file.  Scheduled Meds:  Continuous Infusions:  PRN Meds:    Review of patient's allergies indicates:   Allergen Reactions    Iodine and iodide containing products Anaphylaxis    Iodine     Shellfish containing products Nausea And Vomiting     Other reaction(s): NAUSEA,VOMITING     There are no hospital problems to display for this patient.    Blood pressure 139/88, pulse 69, height 6' (1.829 m), weight 88 kg (194 lb 0.1 oz).                            Subjective  Objective:  Vital signs (most recent): Blood pressure 139/88, pulse 69, height 6' (1.829 m), weight 88 kg (194 lb 0.1 oz).    Assessment & Plan  Patient presents today for follow-up he previously had a hematoma removed from the lateral aspect of the right foot while the area healed very well the incision did split open requiring unrelated evaluation and management and additional wound care.  Patient is also under my care for pain management he current has a current pain management contract on file.  Patient states he thinks the areas doing a lot better he does have decreased discomfort decreased swelling noted.  The open area itself has considerably decreased in size and is currently 1.2 cm long by 2 mm deep with granular tissue no signs of infection.  Patient will clean with Dakin solution gently pack iodoform packing strips overlying the area with a light dressing patient can wear regular shoe as tolerated.  Patient can now get the area wet but must clean it and re-dress it immediately after bathing.  I do plan to follow up with the patient in 2 weeks at which time I will likely be able to discontinue the dressing completely allowing the patient to significantly start to increase activity.  Separate evaluation and management performed today  for continued wound care as well as providing pain management services.  This note was created using [employer] voice recognition software that occasionally misinterpreted phrases or words.   Jah Torres DPM  4/23/2024

## 2024-05-02 ENCOUNTER — PATIENT MESSAGE (OUTPATIENT)
Dept: PSYCHIATRY | Facility: CLINIC | Age: 49
End: 2024-05-02
Payer: MEDICARE

## 2024-05-06 ENCOUNTER — TELEPHONE (OUTPATIENT)
Dept: PODIATRY | Facility: CLINIC | Age: 49
End: 2024-05-06
Payer: MEDICARE

## 2024-05-06 ENCOUNTER — OFFICE VISIT (OUTPATIENT)
Dept: PODIATRY | Facility: CLINIC | Age: 49
End: 2024-05-06
Payer: MEDICARE

## 2024-05-06 VITALS
WEIGHT: 185 LBS | SYSTOLIC BLOOD PRESSURE: 147 MMHG | HEIGHT: 72 IN | DIASTOLIC BLOOD PRESSURE: 87 MMHG | RESPIRATION RATE: 16 BRPM | HEART RATE: 75 BPM | BODY MASS INDEX: 25.06 KG/M2

## 2024-05-06 DIAGNOSIS — S91.301A WOUND OF RIGHT FOOT: Primary | ICD-10-CM

## 2024-05-06 PROCEDURE — 99213 OFFICE O/P EST LOW 20 MIN: CPT | Mod: 24,S$PBB,, | Performed by: PODIATRIST

## 2024-05-06 PROCEDURE — 99999 PR PBB SHADOW E&M-EST. PATIENT-LVL IV: CPT | Mod: PBBFAC,,, | Performed by: PODIATRIST

## 2024-05-06 PROCEDURE — 99214 OFFICE O/P EST MOD 30 MIN: CPT | Mod: PBBFAC | Performed by: PODIATRIST

## 2024-05-06 RX ORDER — OXYCODONE AND ACETAMINOPHEN 10; 325 MG/1; MG/1
1 TABLET ORAL 3 TIMES DAILY
Qty: 90 TABLET | Refills: 0 | Status: SHIPPED | OUTPATIENT
Start: 2024-05-06 | End: 2024-05-13 | Stop reason: SDUPTHER

## 2024-05-06 RX ORDER — ASPIRIN 325 MG
1250 TABLET, DELAYED RELEASE (ENTERIC COATED) ORAL
COMMUNITY
Start: 2024-04-24

## 2024-05-06 NOTE — TELEPHONE ENCOUNTER
Called pt to inform him that [facility] would not fill his Percocet prescription. Patient did not answer, however, I did leave a voicemail for pt to call back with another pharmacy of choice. SYLVIA Gonzalez 05/06/2024

## 2024-05-06 NOTE — TELEPHONE ENCOUNTER
----- Message from Romie Ab sent at 5/6/2024 10:38 AM CDT -----  Regarding: pharm    Type:  Pharmacy Calling to Clarify an RX    Name of Caller:  Zack  Pharmacy Name:    [id number] - [place], [place] - [address]  [address]  [facility] [place] [address]  Phone: [phone] Fax: [phone]    Prescription Name:  oxyCODONE-acetaminophen (PERCOCET)  mg per dpneje09 wnrind0505/6/20246/5/2024Sig - Route: Take 1 tablet by mouth 3 (three) times daily. - OralSent to pharmacy as: oxyCODONE-acetaminophen (PERCOCET)  mg per tabletEarliest Fill Date: 5/6/2024Notes to Pharmacy: n/aE-Prescribing Status: Receipt confirmed by pharmacy (5/6/2024  9:11 AM CDT)     What do they need to clarify?:  can't be filled at [facility]   Best Call Back Number:  [phone]  Additional Information:  please call to advice. Thank

## 2024-05-07 NOTE — PROGRESS NOTES
Sg Sesay is a 48 y.o. male patient.   1. Wound of right foot        Past Medical History:   Diagnosis Date    Back pain     MS (multiple sclerosis)     Sleep apnea      No past surgical history pertinent negatives on file.  Scheduled Meds:  Continuous Infusions:  PRN Meds:    Review of patient's allergies indicates:   Allergen Reactions    Iodine and iodide containing products Anaphylaxis    Iodine     Shellfish containing products Nausea And Vomiting     Other reaction(s): NAUSEA,VOMITING     There are no hospital problems to display for this patient.    Blood pressure (!) 147/87, pulse 75, resp. rate 16, height 6' (1.829 m), weight 83.9 kg (185 lb).                                              Subjective  Objective:  Vital signs (most recent): Blood pressure (!) 147/87, pulse 75, resp. rate 16, height 6' (1.829 m), weight 83.9 kg (185 lb).    Assessment & Plan  Patient presents today for follow-up he previously had a hematoma removed from the lateral aspect of the right foot while the area healed very well the incision did split open requiring unrelated evaluation and management and additional wound care.  Patient is also under my care for pain management he current has a current pain management contract on file.  Patient states he thinks the areas doing a lot better he does have decreased discomfort decreased swelling noted.  The open areas considerably better it is very superficial minimal in length width and depth I am going to allow the patient to start getting the area wet however he must clean it with Dakin solution immediately following I do recommend just keeping a light bandage over the area to prevent any rubbing and irritation of the incision site.  Patient has a considerable reduction in previously noted edema of the lateral aspect of the patient's right foot he states overall it feels a 1000% better.  Patient will start getting the area wet with just a light bandage I plan to follow up with  him in 3 weeks I did dispense the patient a new pain prescription today he will likely need a drug screen at his follow-up appointment which is scheduled for 3 weeks.  Patient can start to gradually increase activity as tolerated if he has any problems questions or concerns he is to contact us.  Separate evaluation and management performed today for continued wound care as well as providing pain management services.  This note was created using [employer] voice recognition software that occasionally misinterpreted phrases or words.   Jah Torres DPM  5/7/2024

## 2024-05-13 ENCOUNTER — TELEPHONE (OUTPATIENT)
Dept: PODIATRY | Facility: CLINIC | Age: 49
End: 2024-05-13
Payer: MEDICARE

## 2024-05-13 DIAGNOSIS — M79.2 NEURITIS: Primary | ICD-10-CM

## 2024-05-13 RX ORDER — OXYCODONE AND ACETAMINOPHEN 10; 325 MG/1; MG/1
1 TABLET ORAL 3 TIMES DAILY
Qty: 90 TABLET | Refills: 0 | Status: SHIPPED | OUTPATIENT
Start: 2024-05-13 | End: 2024-05-15 | Stop reason: SDUPTHER

## 2024-05-13 NOTE — TELEPHONE ENCOUNTER
----- Message from Pooja Dhillon sent at 5/13/2024  3:20 PM CDT -----  Type:  Patient Returning Call    Who Called:  pt  Who Left Message for Patient:  nurse  Does the patient know what this is regarding?:  yes  Best Call Back Number:  [phone]  Additional Information:  pt is returning call from nurse  Please call back to advise

## 2024-05-13 NOTE — TELEPHONE ENCOUNTER
----- Message from Eden Banks sent at 5/13/2024  9:16 AM CDT -----  Type: Needs Medical Advice  Who Called:  patient  Best Call Back Number: [phone] (home)   Additional Information: requesting a call back- [facility] is having trouble filling the prescriptions without speaking to the dr,

## 2024-05-13 NOTE — TELEPHONE ENCOUNTER
Spoke with patient and he said [facility] won't fill his presciption. Contacted [facility] and they said they wouldn't fill prescription because this is a month supply of medication and doesn't fall under acute therapy. Advised pharmacy that patient has chronic pain and has signed pain mgmt contract. Pharmacists says he isn't filling this from podiatry and that you don't have credentials for pain management. Please advise

## 2024-05-15 ENCOUNTER — TELEPHONE (OUTPATIENT)
Dept: PODIATRY | Facility: CLINIC | Age: 49
End: 2024-05-15
Payer: MEDICARE

## 2024-05-15 RX ORDER — OXYCODONE AND ACETAMINOPHEN 10; 325 MG/1; MG/1
1 TABLET ORAL 2 TIMES DAILY
Qty: 60 TABLET | Refills: 0 | Status: SHIPPED | OUTPATIENT
Start: 2024-05-15 | End: 2024-05-29 | Stop reason: SDUPTHER

## 2024-05-15 RX ORDER — OXYCODONE AND ACETAMINOPHEN 10; 325 MG/1; MG/1
1 TABLET ORAL 2 TIMES DAILY
Qty: 60 TABLET | Refills: 0 | Status: SHIPPED | OUTPATIENT
Start: 2024-05-15 | End: 2024-05-15 | Stop reason: SDUPTHER

## 2024-05-15 RX ORDER — PREGABALIN 200 MG/1
200 CAPSULE ORAL 3 TIMES DAILY
Qty: 90 CAPSULE | Refills: 5 | Status: SHIPPED | OUTPATIENT
Start: 2024-05-15 | End: 2024-11-11

## 2024-05-15 NOTE — TELEPHONE ENCOUNTER
LVM for patient that the prescriptions he requested have been sent to [facility] in [place].

## 2024-05-15 NOTE — TELEPHONE ENCOUNTER
[facility] said they didn't have enough pain meds in and they wouldn't fill prescription. Advised patient at this point we could just print prescription and he can pick it up until he finds some place to fill it.

## 2024-05-15 NOTE — TELEPHONE ENCOUNTER
Patient requesting refill of lyrica and percocet be sent to [facility] in [place]. Explained they most likely won't fill if this is his first time using them. Patient was told if other med was sent in with it that they would fill it. [facility] Please advise.

## 2024-05-15 NOTE — TELEPHONE ENCOUNTER
----- Message from Pooja Dhillon sent at 5/15/2024  8:36 AM CDT -----  Type: Needs Medical Advice  Who Called:  pt  Symptoms (please be specific):  pain  How long has patient had these symptoms:    Pharmacy name and phone #:    [id number] [place] - [address]  [address]  [place] [address]  Phone: [phone] Fax: [phone]          Best Call Back Number: [phone]    Additional Information: Pt is reaching out to speak to nurse in the office.  Please call back to advise

## 2024-05-29 ENCOUNTER — OFFICE VISIT (OUTPATIENT)
Dept: PODIATRY | Facility: CLINIC | Age: 49
End: 2024-05-29
Payer: MEDICARE

## 2024-05-29 VITALS
HEART RATE: 71 BPM | WEIGHT: 184.94 LBS | HEIGHT: 72 IN | DIASTOLIC BLOOD PRESSURE: 89 MMHG | SYSTOLIC BLOOD PRESSURE: 132 MMHG | BODY MASS INDEX: 25.05 KG/M2

## 2024-05-29 DIAGNOSIS — M79.2 NEURITIS: ICD-10-CM

## 2024-05-29 DIAGNOSIS — S93.311A CUBOID SYNDROME OF RIGHT FOOT: Primary | ICD-10-CM

## 2024-05-29 DIAGNOSIS — Z02.89 PAIN MANAGEMENT CONTRACT AGREEMENT: ICD-10-CM

## 2024-05-29 PROCEDURE — 99213 OFFICE O/P EST LOW 20 MIN: CPT | Mod: S$PBB,,, | Performed by: PODIATRIST

## 2024-05-29 PROCEDURE — 99999 PR PBB SHADOW E&M-EST. PATIENT-LVL IV: CPT | Mod: PBBFAC,,, | Performed by: PODIATRIST

## 2024-05-29 PROCEDURE — 99214 OFFICE O/P EST MOD 30 MIN: CPT | Mod: PBBFAC | Performed by: PODIATRIST

## 2024-05-29 RX ORDER — HYDROXYZINE HYDROCHLORIDE 25 MG/1
1 TABLET, FILM COATED ORAL DAILY PRN
COMMUNITY
Start: 2024-05-21

## 2024-05-29 RX ORDER — OXYCODONE AND ACETAMINOPHEN 10; 325 MG/1; MG/1
1 TABLET ORAL 2 TIMES DAILY
Qty: 60 TABLET | Refills: 0 | Status: SHIPPED | OUTPATIENT
Start: 2024-05-29 | End: 2024-06-28

## 2024-05-29 RX ORDER — OXYCODONE AND ACETAMINOPHEN 10; 325 MG/1; MG/1
1 TABLET ORAL 2 TIMES DAILY
Qty: 60 TABLET | Refills: 0 | Status: SHIPPED | OUTPATIENT
Start: 2024-05-29 | End: 2024-05-29 | Stop reason: SDUPTHER

## 2024-06-01 NOTE — PROGRESS NOTES
Subjective:       Patient ID: Sg Sesay is a 48 y.o. male.    Chief Complaint: Post-op Evaluation and Foot Pain     Patient presents today for follow-up he previously had a bone resection and a tendon transfer on the lateral portion of the patient's right foot.  Patient is currently under my care for pain management.   Foot Pain  Associated symptoms include joint swelling.   Follow-up  Associated symptoms include joint swelling.   Medication Refill  Associated symptoms include joint swelling.   Toe Pain     Ankle Pain     Foot Injury        Review of Systems   Musculoskeletal:  Positive for back pain and joint swelling.   All other systems reviewed and are negative.      Objective:      Physical Exam  Vitals and nursing note reviewed.   Constitutional:       Appearance: Normal appearance. He is well-developed.   Cardiovascular:      Rate and Rhythm: Normal rate and regular rhythm.      Pulses: Normal pulses.           Dorsalis pedis pulses are 2+ on the right side and 2+ on the left side.        Posterior tibial pulses are 2+ on the right side and 2+ on the left side.      Heart sounds: Normal heart sounds.   Pulmonary:      Effort: Pulmonary effort is normal.      Breath sounds: Normal breath sounds.   Musculoskeletal:         General: Swelling and tenderness present.      Right foot: Deformity present.        Feet:    Feet:      Right foot:      Protective Sensation: 4 sites tested.  4 sites sensed.      Skin integrity: Blister and erythema present.      Left foot:      Protective Sensation: 4 sites tested.  4 sites sensed.   Skin:     General: Skin is warm.      Capillary Refill: Capillary refill takes less than 2 seconds.   Neurological:      General: No focal deficit present.      Mental Status: He is alert.   Psychiatric:         Mood and Affect: Mood normal.         Behavior: Behavior normal.         Thought Content: Thought content normal.         Judgment: Judgment normal.                                                     Assessment:       1. Cuboid syndrome of right foot    2. Neuritis    3. Pain management contract agreement          Plan:       Patient presents today for follow-up he previously had a bone resection and a tendon transfer on the lateral portion of the patient's right foot.  Patient is currently under my care for pain management.  Patient states he continues to improve he still having some discomfort in it feels like a lump on the plantar lateral aspect of the right foot overall he is doing a lot better the area where he had a previous incision with hematoma has completely healed and closed it looks considerably better and definitely feels better I feel the patient is progressing very well.  Patient is now beyond his global period following surgery.  Last time I had seen the patient he was still in his 90 day global.  He is still under my care for pain management we have appropriate pain management contract on file on the patient's drug screen is up-to-date however the patient's [facility] pharmacist refused to fill his pain medication since the pharmacist felt it was in appropriate for the patient to be on pain management although he does not have the patient's full medical history.  Patient has subsequently was able to get his pain medication filled he does have some residual nerve damage neuritis he also has a history of MS.  Patient continues to improve I did discuss possibly transitioning the patient to tramadol stepping down his pain medication I have already decrease the quantity that he is getting I do plan to follow up with him in 1 month for re-evaluation but I do feel the patient is progressing well and should continue to progress well with increased activity. This note was created using [employer] voice recognition software that occasionally misinterpreted phrases or words.

## 2024-06-26 ENCOUNTER — OFFICE VISIT (OUTPATIENT)
Dept: PODIATRY | Facility: CLINIC | Age: 49
End: 2024-06-26
Payer: MEDICARE

## 2024-06-26 VITALS
SYSTOLIC BLOOD PRESSURE: 130 MMHG | WEIGHT: 184.94 LBS | HEART RATE: 83 BPM | HEIGHT: 72 IN | BODY MASS INDEX: 25.05 KG/M2 | DIASTOLIC BLOOD PRESSURE: 79 MMHG

## 2024-06-26 DIAGNOSIS — Z02.89 PAIN MANAGEMENT CONTRACT AGREEMENT: ICD-10-CM

## 2024-06-26 DIAGNOSIS — M19.071 PRIMARY OSTEOARTHRITIS OF RIGHT FOOT: ICD-10-CM

## 2024-06-26 DIAGNOSIS — G35 MS (MULTIPLE SCLEROSIS): ICD-10-CM

## 2024-06-26 DIAGNOSIS — M79.2 NEURITIS: Primary | ICD-10-CM

## 2024-06-26 PROCEDURE — 99213 OFFICE O/P EST LOW 20 MIN: CPT | Mod: S$PBB,,, | Performed by: PODIATRIST

## 2024-06-26 PROCEDURE — 99213 OFFICE O/P EST LOW 20 MIN: CPT | Mod: PBBFAC | Performed by: PODIATRIST

## 2024-06-26 PROCEDURE — 99999 PR PBB SHADOW E&M-EST. PATIENT-LVL III: CPT | Mod: PBBFAC,,, | Performed by: PODIATRIST

## 2024-06-26 RX ORDER — OXYCODONE AND ACETAMINOPHEN 10; 325 MG/1; MG/1
1 TABLET ORAL 2 TIMES DAILY
Qty: 60 TABLET | Refills: 0 | Status: SHIPPED | OUTPATIENT
Start: 2024-06-26 | End: 2024-07-26

## 2024-06-26 RX ORDER — DULOXETIN HYDROCHLORIDE 20 MG/1
20 CAPSULE, DELAYED RELEASE ORAL NIGHTLY
Qty: 30 CAPSULE | Refills: 3 | Status: SHIPPED | OUTPATIENT
Start: 2024-06-26 | End: 2024-10-24

## 2024-06-29 NOTE — PROGRESS NOTES
Subjective:       Patient ID: Sg Sesay is a 48 y.o. male.    Chief Complaint: No chief complaint on file.     Patient presents today for follow-up he previously had a bone resection and a tendon transfer on the lateral portion of the patient's right foot.  Patient is currently under my care for pain management.   Foot Pain  Associated symptoms include joint swelling.   Follow-up  Associated symptoms include joint swelling.   Medication Refill  Associated symptoms include joint swelling.   Toe Pain     Ankle Pain     Foot Injury        Review of Systems   Musculoskeletal:  Positive for back pain and joint swelling.   All other systems reviewed and are negative.      Objective:      Physical Exam  Vitals and nursing note reviewed.   Constitutional:       Appearance: Normal appearance. He is well-developed.   Cardiovascular:      Rate and Rhythm: Normal rate and regular rhythm.      Pulses: Normal pulses.           Dorsalis pedis pulses are 2+ on the right side and 2+ on the left side.        Posterior tibial pulses are 2+ on the right side and 2+ on the left side.      Heart sounds: Normal heart sounds.   Pulmonary:      Effort: Pulmonary effort is normal.      Breath sounds: Normal breath sounds.   Musculoskeletal:         General: Swelling and tenderness present.      Right foot: Deformity present.        Feet:    Feet:      Right foot:      Protective Sensation: 4 sites tested.  4 sites sensed.      Skin integrity: Blister and erythema present.      Left foot:      Protective Sensation: 4 sites tested.  4 sites sensed.   Skin:     General: Skin is warm.      Capillary Refill: Capillary refill takes less than 2 seconds.   Neurological:      General: No focal deficit present.      Mental Status: He is alert.   Psychiatric:         Mood and Affect: Mood normal.         Behavior: Behavior normal.         Thought Content: Thought content normal.         Judgment: Judgment normal.                                         Assessment:       1. Neuritis    2. MS (multiple sclerosis)    3. Primary osteoarthritis of right foot    4. Pain management contract agreement          Plan:       Patient presents today for follow-up he previously had a bone resection and a tendon transfer on the lateral portion of the patient's right foot.  Patient is currently under my care for pain management.  Patient states he continues to improve he still having some discomfort in it feels like a lump on the plantar lateral aspect of the right foot overall he is doing a lot better the area where he had a previous incision with hematoma has completely healed and closed it looks considerably better and definitely feels better I feel the patient is progressing very well.  Patient is now beyond his global period following surgery.    He is still under my care for pain management we have appropriate pain management contract on file on the patient's drug screen is up-to-date.  The patient does have some residual nerve damage neuritis he also has a history of MS.  Patient continues to improve I did discuss possibly transitioning the patient to tramadol stepping down his pain medication I have already decrease the quantity that he is getting I do plan to follow up with him in 1 month for re-evaluation but I do feel the patient is progressing well and should continue to progress well with increased activity.  Because of the patient's nerve pain that he is currently having he does continue to take Lyrica I did discuss possibly starting the patient on Cymbalta or Savella.  Patient was currently taking BuSpar but he does not take this on a regular basis I am going to put the patient on Cymbalta 20 mg in the evening to help with his nerve related symptoms we can adjust the dosage accordingly depending upon how well the patient tolerates the medication.  Patient does have a lot less swelling today than previous evaluation on the right  foot but in general he relates he is having severe nerve pain and discomfort in both feet on a regular basis he states he is getting about 3 hours of sleep a night because of the discomfort in his feet I feel that the Cymbalta will likely address this especially at night.  This note was created using [employer] voice recognition software that occasionally misinterpreted phrases or words.

## 2024-07-19 ENCOUNTER — TELEPHONE (OUTPATIENT)
Dept: PODIATRY | Facility: CLINIC | Age: 49
End: 2024-07-19
Payer: MEDICARE

## 2024-07-19 RX ORDER — DICLOFENAC SODIUM 75 MG/1
75 TABLET, DELAYED RELEASE ORAL 2 TIMES DAILY
Qty: 60 TABLET | Refills: 4 | Status: SHIPPED | OUTPATIENT
Start: 2024-07-19 | End: 2024-12-16

## 2024-07-19 NOTE — TELEPHONE ENCOUNTER
Pharmacist questioning when surgery was and when due to length patient on percocet. Advised per previous note it looks like provider will be trying to transition to less quantity and maybe alternate medications.

## 2024-07-19 NOTE — TELEPHONE ENCOUNTER
----- Message from Alka Burns sent at 7/19/2024  1:36 PM CDT -----  Contact: Jacob from [facility]  Type:  Pharmacy Calling to Clarify an RX    Name of Caller: Jacob from [facility]    Pharmacy Name:      [facility]  [place]  [phone] - Time    Prescription Name:  oxyCODONE-acetaminophen (PERCOCET)  mg per tablet     What do they need to clarify?:  Diagnosis code    Best Call Back Number:  [phone] - Jacob    Additional Information:   States he would like to speak with someone - states needs diagnosis code - states patient [facility] also [facility] that might [facility] with this medication - please call - thank you

## 2024-07-25 ENCOUNTER — PATIENT MESSAGE (OUTPATIENT)
Dept: PSYCHIATRY | Facility: CLINIC | Age: 49
End: 2024-07-25
Payer: MEDICARE

## 2024-07-29 ENCOUNTER — HOSPITAL ENCOUNTER (OUTPATIENT)
Dept: RADIOLOGY | Facility: HOSPITAL | Age: 49
Discharge: HOME | End: 2024-07-29
Attending: PODIATRIST
Payer: MEDICARE

## 2024-07-29 ENCOUNTER — OFFICE VISIT (OUTPATIENT)
Dept: PODIATRY | Facility: CLINIC | Age: 49
End: 2024-07-29
Payer: MEDICARE

## 2024-07-29 VITALS
HEIGHT: 72 IN | BODY MASS INDEX: 25.05 KG/M2 | WEIGHT: 184.94 LBS | DIASTOLIC BLOOD PRESSURE: 85 MMHG | SYSTOLIC BLOOD PRESSURE: 150 MMHG | HEART RATE: 69 BPM

## 2024-07-29 DIAGNOSIS — G35 MS (MULTIPLE SCLEROSIS): ICD-10-CM

## 2024-07-29 DIAGNOSIS — M79.671 FOOT PAIN, RIGHT: Primary | ICD-10-CM

## 2024-07-29 DIAGNOSIS — M79.2 NEURITIS: ICD-10-CM

## 2024-07-29 DIAGNOSIS — M79.671 FOOT PAIN, RIGHT: ICD-10-CM

## 2024-07-29 PROCEDURE — 73630 X-RAY EXAM OF FOOT: CPT | Mod: 26,RT,, | Performed by: RADIOLOGY

## 2024-07-29 PROCEDURE — 99213 OFFICE O/P EST LOW 20 MIN: CPT | Mod: S$PBB,,, | Performed by: PODIATRIST

## 2024-07-29 PROCEDURE — 73630 X-RAY EXAM OF FOOT: CPT | Mod: TC,RT

## 2024-07-29 PROCEDURE — 99214 OFFICE O/P EST MOD 30 MIN: CPT | Mod: PBBFAC,25 | Performed by: PODIATRIST

## 2024-07-29 PROCEDURE — 99999 PR PBB SHADOW E&M-EST. PATIENT-LVL IV: CPT | Mod: PBBFAC,,, | Performed by: PODIATRIST

## 2024-07-29 RX ORDER — BUSPIRONE HYDROCHLORIDE 5 MG/1
5 TABLET ORAL
COMMUNITY
Start: 2024-04-04 | End: 2024-07-29

## 2024-07-29 RX ORDER — OXYCODONE AND ACETAMINOPHEN 10; 325 MG/1; MG/1
1 TABLET ORAL 2 TIMES DAILY
Qty: 60 TABLET | Refills: 0 | Status: SHIPPED | OUTPATIENT
Start: 2024-07-29 | End: 2024-08-28

## 2024-07-29 RX ORDER — DULOXETIN HYDROCHLORIDE 30 MG/1
30 CAPSULE, DELAYED RELEASE ORAL 2 TIMES DAILY
Qty: 60 CAPSULE | Refills: 11 | Status: SHIPPED | OUTPATIENT
Start: 2024-07-29 | End: 2025-07-29

## 2024-07-29 RX ORDER — OXYCODONE AND ACETAMINOPHEN 10; 325 MG/1; MG/1
1 TABLET ORAL 2 TIMES DAILY
Qty: 60 TABLET | Refills: 0 | Status: SHIPPED | OUTPATIENT
Start: 2024-07-29 | End: 2024-07-29 | Stop reason: SDUPTHER

## 2024-07-29 RX ORDER — BUSPIRONE HYDROCHLORIDE 10 MG/1
20 TABLET ORAL
COMMUNITY
Start: 2024-04-04 | End: 2024-07-29

## 2024-07-30 PROBLEM — M35.7 HYPERMOBILE JOINT SYNDROME OF RIGHT FOOT: Status: RESOLVED | Noted: 2022-08-08 | Resolved: 2024-07-30

## 2024-07-30 PROBLEM — S90.31XA HEMATOMA OF RIGHT FOOT: Status: RESOLVED | Noted: 2024-02-19 | Resolved: 2024-07-30

## 2024-07-30 PROBLEM — S91.301A WOUND OF RIGHT FOOT: Status: RESOLVED | Noted: 2021-10-20 | Resolved: 2024-07-30

## 2024-07-30 NOTE — PROGRESS NOTES
Subjective:       Patient ID: Sg Sesay is a 48 y.o. male.    Chief Complaint: Neuritis      Patient presents today for follow-up he previously had a bone resection and a tendon transfer on the lateral portion of the patient's right foot.  Patient is currently under my care for pain management.   Foot Pain  Associated symptoms include joint swelling.   Follow-up  Associated symptoms include joint swelling.   Medication Refill  Associated symptoms include joint swelling.   Toe Pain     Ankle Pain     Foot Injury        Review of Systems   Musculoskeletal:  Positive for back pain and joint swelling.   All other systems reviewed and are negative.      Objective:      Physical Exam  Vitals and nursing note reviewed.   Constitutional:       Appearance: Normal appearance. He is well-developed.   Cardiovascular:      Rate and Rhythm: Normal rate and regular rhythm.      Pulses: Normal pulses.           Dorsalis pedis pulses are 2+ on the right side and 2+ on the left side.        Posterior tibial pulses are 2+ on the right side and 2+ on the left side.      Heart sounds: Normal heart sounds.   Pulmonary:      Effort: Pulmonary effort is normal.      Breath sounds: Normal breath sounds.   Musculoskeletal:         General: Swelling and tenderness present.      Right foot: Deformity present.        Feet:    Feet:      Right foot:      Protective Sensation: 4 sites tested.  4 sites sensed.      Skin integrity: Blister and erythema present.      Left foot:      Protective Sensation: 4 sites tested.  4 sites sensed.   Skin:     General: Skin is warm.      Capillary Refill: Capillary refill takes less than 2 seconds.   Neurological:      General: No focal deficit present.      Mental Status: He is alert.   Psychiatric:         Mood and Affect: Mood normal.         Behavior: Behavior normal.         Thought Content: Thought content normal.         Judgment: Judgment normal.                                                       Assessment:       1. Foot pain, right    2. Neuritis    3. MS (multiple sclerosis)          Plan:       Patient presents today for follow-up he previously had a bone resection and a tendon transfer on the lateral portion of the patient's right foot.  Patient is currently under my care for pain management.  Patient states he continues to improve he still having some discomfort in it feels like a lump on the plantar lateral aspect of the right foot overall he is doing a lot better the area where he had a previous incision with hematoma has completely healed and closed it looks considerably better and definitely feels better I feel the patient is progressing very well.  Patient is now beyond his global period following surgery.    He is still under my care for pain management we have appropriate pain management contract on file on the patient's drug screen is up-to-date.  The patient does have some residual nerve damage neuritis he also has a history of MS.  Patient continues to improve I did discuss possibly transitioning the patient to tramadol stepping down his pain medication I have already decrease the quantity that he is getting I do plan to follow up with him in 1 month for re-evaluation but I do feel the patient is progressing well and should continue to progress well with increased activity.  Because of the patient's nerve pain that he is currently having he does continue to take Lyrica.  At the time of the patient's last visit I discussed putting the patient on Cymbalta 20 mg daily he states he has been taking this but has not noticed any improvement or change in his symptoms I did recommend increasing the Cymbalta to 30 mg twice a day I am going to have him initially start out with 30 mg daily if he does not see an improvement in his symptoms in the next 7 days he is going to go to taking 30 mg in the morning 30 mg in the evening.  Patient states his feet feel  very tight he feels as if he is walking on balloons I did advised the patient this is nerve related discomfort that he is experiencing.  I do plan to follow up with the patient 1 month unless he has any problems questions or concerns sooner.  Patient was dispensed a prescription for Percocet as well as the Cymbalta.  This note was created using [employer] voice recognition software that occasionally misinterpreted phrases or words.

## 2024-08-05 ENCOUNTER — PATIENT MESSAGE (OUTPATIENT)
Dept: PSYCHIATRY | Facility: CLINIC | Age: 49
End: 2024-08-05
Payer: MEDICARE

## 2024-08-26 ENCOUNTER — LAB VISIT (OUTPATIENT)
Dept: LAB | Facility: HOSPITAL | Age: 49
End: 2024-08-26
Attending: PODIATRIST
Payer: MEDICARE

## 2024-08-26 ENCOUNTER — TELEPHONE (OUTPATIENT)
Dept: PODIATRY | Facility: CLINIC | Age: 49
End: 2024-08-26
Payer: MEDICARE

## 2024-08-26 ENCOUNTER — OFFICE VISIT (OUTPATIENT)
Dept: PODIATRY | Facility: CLINIC | Age: 49
End: 2024-08-26
Payer: MEDICARE

## 2024-08-26 VITALS
DIASTOLIC BLOOD PRESSURE: 94 MMHG | WEIGHT: 184.94 LBS | HEART RATE: 76 BPM | BODY MASS INDEX: 25.05 KG/M2 | HEIGHT: 72 IN | SYSTOLIC BLOOD PRESSURE: 159 MMHG

## 2024-08-26 DIAGNOSIS — M79.2 NEURITIS: ICD-10-CM

## 2024-08-26 DIAGNOSIS — G35 MS (MULTIPLE SCLEROSIS): ICD-10-CM

## 2024-08-26 DIAGNOSIS — M79.2 NEURITIS: Primary | ICD-10-CM

## 2024-08-26 DIAGNOSIS — M79.671 FOOT PAIN, RIGHT: ICD-10-CM

## 2024-08-26 LAB
AMPHET+METHAMPHET UR QL: NEGATIVE
BARBITURATES UR QL SCN>200 NG/ML: NEGATIVE
BENZODIAZ UR QL SCN>200 NG/ML: NEGATIVE
BZE UR QL SCN: NEGATIVE
CANNABINOIDS UR QL SCN: NEGATIVE
CREAT UR-MCNC: 190.2 MG/DL (ref 23–375)
METHADONE UR QL SCN>300 NG/ML: NEGATIVE
OPIATES UR QL SCN: NEGATIVE
PCP UR QL SCN>25 NG/ML: NEGATIVE
TOXICOLOGY INFORMATION: NORMAL

## 2024-08-26 PROCEDURE — 99214 OFFICE O/P EST MOD 30 MIN: CPT | Mod: PBBFAC | Performed by: PODIATRIST

## 2024-08-26 PROCEDURE — 80307 DRUG TEST PRSMV CHEM ANLYZR: CPT | Performed by: PODIATRIST

## 2024-08-26 PROCEDURE — 99213 OFFICE O/P EST LOW 20 MIN: CPT | Mod: S$PBB,,, | Performed by: PODIATRIST

## 2024-08-26 PROCEDURE — 99999 PR PBB SHADOW E&M-EST. PATIENT-LVL IV: CPT | Mod: PBBFAC,,, | Performed by: PODIATRIST

## 2024-08-26 RX ORDER — OXYCODONE AND ACETAMINOPHEN 10; 325 MG/1; MG/1
1 TABLET ORAL 2 TIMES DAILY
Qty: 60 TABLET | Refills: 0 | Status: SHIPPED | OUTPATIENT
Start: 2024-08-26 | End: 2024-09-25

## 2024-08-26 NOTE — TELEPHONE ENCOUNTER
----- Message from Jah Torres DPM sent at 8/26/2024 11:29 AM CDT -----  Please advised drug screen is fine prescription has been sent.  ----- Message -----  From: [employer]  Sent: 8/26/2024  10:57 AM CDT  To: Jah Torres DPM

## 2024-08-27 ENCOUNTER — PATIENT MESSAGE (OUTPATIENT)
Dept: PODIATRY | Facility: CLINIC | Age: 49
End: 2024-08-27
Payer: MEDICARE

## 2024-09-23 ENCOUNTER — OFFICE VISIT (OUTPATIENT)
Dept: PODIATRY | Facility: CLINIC | Age: 49
End: 2024-09-23
Payer: MEDICARE

## 2024-09-23 VITALS
WEIGHT: 184.94 LBS | HEIGHT: 72 IN | BODY MASS INDEX: 25.05 KG/M2 | DIASTOLIC BLOOD PRESSURE: 104 MMHG | SYSTOLIC BLOOD PRESSURE: 150 MMHG | HEART RATE: 74 BPM

## 2024-09-23 DIAGNOSIS — M79.671 FOOT PAIN, RIGHT: ICD-10-CM

## 2024-09-23 DIAGNOSIS — M25.571 CHRONIC PAIN OF RIGHT ANKLE: ICD-10-CM

## 2024-09-23 DIAGNOSIS — G35 MS (MULTIPLE SCLEROSIS): ICD-10-CM

## 2024-09-23 DIAGNOSIS — M79.671 CHRONIC PAIN IN RIGHT FOOT: ICD-10-CM

## 2024-09-23 DIAGNOSIS — G89.29 CHRONIC PAIN OF RIGHT ANKLE: ICD-10-CM

## 2024-09-23 DIAGNOSIS — Z02.89 PAIN MANAGEMENT CONTRACT AGREEMENT: ICD-10-CM

## 2024-09-23 DIAGNOSIS — G89.29 CHRONIC PAIN IN RIGHT FOOT: ICD-10-CM

## 2024-09-23 DIAGNOSIS — M79.2 NEURITIS: Primary | ICD-10-CM

## 2024-09-23 PROCEDURE — 99213 OFFICE O/P EST LOW 20 MIN: CPT | Mod: S$PBB,,, | Performed by: PODIATRIST

## 2024-09-23 PROCEDURE — 99999 PR PBB SHADOW E&M-EST. PATIENT-LVL IV: CPT | Mod: PBBFAC,,, | Performed by: PODIATRIST

## 2024-09-23 PROCEDURE — 99214 OFFICE O/P EST MOD 30 MIN: CPT | Mod: PBBFAC | Performed by: PODIATRIST

## 2024-09-23 RX ORDER — OXYCODONE AND ACETAMINOPHEN 10; 325 MG/1; MG/1
1 TABLET ORAL 2 TIMES DAILY
Qty: 60 TABLET | Refills: 0 | Status: SHIPPED | OUTPATIENT
Start: 2024-09-23 | End: 2024-10-23

## 2024-09-23 RX ORDER — PREGABALIN 200 MG/1
200 CAPSULE ORAL 3 TIMES DAILY
Qty: 90 CAPSULE | Refills: 5 | Status: SHIPPED | OUTPATIENT
Start: 2024-09-23 | End: 2025-03-22

## 2024-09-23 NOTE — PROGRESS NOTES
May 8, 2024        Estefanía Anders  [address] [facility] [address]  Phone: [phone]  Fax: [phone]             [facility]  [address]  [facility] [address]  Phone: [phone]   Patient: Adilson Sylvester   MR Number: [id number]   YOB: 1977   Date of Visit: 5/8/2024       Dear Dr. Estefanía Anders    Thank you for referring Adilson Sylvester to me for evaluation. Attached you will find relevant portions of my assessment and plan of care.    If you have questions, please do not hesitate to call me. I look forward to following Adilson Sylvester along with you.    Sincerely,    Robert Mchugh MD    Enclosure    If you would like to receive this communication electronically, please contact [email or web address] or [phone] to request [employer] access.    [employer] is a tool which provides read-only access to select patient information with whom you have a relationship. Its easy to use and provides real time access to review your patients record including encounter summaries, notes, results, and demographic information.    If you feel you have received this communication in error or would no longer like to receive these types of communications, please e-mail [email or web address]      Subjective:       Patient ID: Sg Sesay is a 48 y.o. male.    Chief Complaint: No chief complaint on file.     Patient presents today for follow-up he previously had a bone resection and a tendon transfer on the lateral portion of the patient's right foot.  Patient is currently under my care for pain management.   Foot Pain  Associated symptoms include joint swelling.   Follow-up  Associated symptoms include joint swelling.   Medication Refill  Associated symptoms include joint swelling.   Toe Pain     Ankle Pain     Foot Injury        Review of Systems   Musculoskeletal:  Positive for back pain and joint swelling.   All other systems reviewed and are negative.      Objective:      Physical Exam  Vitals and nursing note reviewed.   Constitutional:       Appearance: Normal appearance. He is well-developed.   Cardiovascular:      Rate and Rhythm: Normal rate and regular rhythm.      Pulses: Normal pulses.           Dorsalis pedis pulses are 2+ on the right side and 2+ on the left side.        Posterior tibial pulses are 2+ on the right side and 2+ on the left side.      Heart sounds: Normal heart sounds.   Pulmonary:      Effort: Pulmonary effort is normal.      Breath sounds: Normal breath sounds.   Musculoskeletal:         General: Swelling and tenderness present.      Right foot: Deformity present.        Feet:    Feet:      Right foot:      Protective Sensation: 4 sites tested.  4 sites sensed.      Skin integrity: Blister and erythema present.      Left foot:      Protective Sensation: 4 sites tested.  4 sites sensed.   Skin:     General: Skin is warm.      Capillary Refill: Capillary refill takes less than 2 seconds.   Neurological:      General: No focal deficit present.      Mental Status: He is alert.   Psychiatric:         Mood and Affect: Mood normal.         Behavior: Behavior normal.         Thought Content: Thought content normal.         Judgment: Judgment normal.                                   Assessment:       1. Neuritis    2. MS (multiple sclerosis)    3. Chronic pain of right ankle    4. Chronic pain in right foot    5. Foot pain, right    6. Pain management contract agreement          Plan:       Patient presents today for follow-up he previously had a bone resection and a tendon transfer on the lateral portion of the patient's right foot.  Patient is currently under my care for pain management.  Patient states he continues to improve he still having some discomfort in it feels like a lump on the plantar lateral aspect of the right foot overall he is doing a lot better the area where he had a previous incision with hematoma has completely healed and closed it looks considerably better and definitely feels better I feel the patient is progressing very well.  Patient states he did have an episode where he was having extreme pain on the very bottom of his right foot at the plantar fascial insertion states that both of his feet are extremely painful by the end of the day once he takes his shoes off he states his feet are excruciating.  Patient states he does have a lot of discomfort in both feet right greater than left he states he can not really pinpoint just 1 area that is bothering him.    He is still under my care for pain management we have appropriate pain management contract on file.  The patient does have some residual nerve damage neuritis he also has a history of MS.  Patient did relate today he has been having a lot of nerve related pain in the 4th digit right this is something that has bothered him in the past however this has been almost constant recently.  Because of the patient's nerve pain that he is currently having he does continue to take Lyrica.   I advised the patient I will submit to see if his insurance will cover this medication if so we can apply this in office.  Patient advised typically this works for 3 months at a time and we need to be reapplied  every 3 months but this may offer him considerable relief.  Patient states his feet feel very tight he feels as if he is walking on balloons I did advised the patient this is nerve related discomfort that he is experiencing.  I do plan to follow up with the patient 1 month unless he has any problems questions or concerns sooner.  Patient was dispensed a prescription for Percocet as well as the Lyrica.  We will consider application of Qutenza in the future.  Patient ultimately has stopped taking the Cymbalta it was not helping him 1 way or the other.  Patient continues to see Dr. Acevedo for his MS he states his most recent MRI of his brain did not show any significant changes.  Certainly I feel that a lot of the patient's discomfort muscle aches pains neuritis may be related additionally to his MS.  This note was created using [employer] voice recognition software that occasionally misinterpreted phrases or words.      Patient's insurance has approved the use of Qutenza.  Will schedule at the patient's convenience

## 2024-10-28 ENCOUNTER — OFFICE VISIT (OUTPATIENT)
Dept: PODIATRY | Facility: CLINIC | Age: 49
End: 2024-10-28
Payer: MEDICARE

## 2024-10-28 ENCOUNTER — TELEPHONE (OUTPATIENT)
Dept: PODIATRY | Facility: CLINIC | Age: 49
End: 2024-10-28
Payer: MEDICARE

## 2024-10-28 VITALS
DIASTOLIC BLOOD PRESSURE: 85 MMHG | RESPIRATION RATE: 18 BRPM | HEIGHT: 72 IN | SYSTOLIC BLOOD PRESSURE: 143 MMHG | HEART RATE: 86 BPM | WEIGHT: 194.31 LBS | BODY MASS INDEX: 26.32 KG/M2

## 2024-10-28 DIAGNOSIS — M79.671 FOOT PAIN, RIGHT: ICD-10-CM

## 2024-10-28 DIAGNOSIS — M19.071 PRIMARY OSTEOARTHRITIS OF RIGHT FOOT: ICD-10-CM

## 2024-10-28 DIAGNOSIS — M79.2 NEURITIS: Primary | ICD-10-CM

## 2024-10-28 DIAGNOSIS — G35 MS (MULTIPLE SCLEROSIS): ICD-10-CM

## 2024-10-28 PROCEDURE — 96372 THER/PROPH/DIAG INJ SC/IM: CPT | Mod: ,,, | Performed by: PODIATRIST

## 2024-10-28 PROCEDURE — 99999PBSHW PR PBB SHADOW TECHNICAL ONLY FILED TO HB: Mod: JZ,PBBFAC,,

## 2024-10-28 PROCEDURE — 99214 OFFICE O/P EST MOD 30 MIN: CPT | Mod: PBBFAC,25 | Performed by: PODIATRIST

## 2024-10-28 PROCEDURE — 99213 OFFICE O/P EST LOW 20 MIN: CPT | Mod: 25,S$PBB,, | Performed by: PODIATRIST

## 2024-10-28 PROCEDURE — 99999 PR PBB SHADOW E&M-EST. PATIENT-LVL IV: CPT | Mod: PBBFAC,,, | Performed by: PODIATRIST

## 2024-10-28 PROCEDURE — 96372 THER/PROPH/DIAG INJ SC/IM: CPT | Mod: PBBFAC

## 2024-10-28 RX ORDER — KETOROLAC TROMETHAMINE 30 MG/ML
60 INJECTION, SOLUTION INTRAMUSCULAR; INTRAVENOUS
Status: COMPLETED | OUTPATIENT
Start: 2024-10-28 | End: 2024-10-28

## 2024-10-28 RX ORDER — TERIFLUNOMIDE 14 MG/1
1 TABLET, FILM COATED ORAL DAILY
COMMUNITY
Start: 2024-07-18

## 2024-10-28 RX ORDER — CELECOXIB 200 MG/1
200 CAPSULE ORAL 2 TIMES DAILY
Qty: 60 CAPSULE | Refills: 4 | Status: SHIPPED | OUTPATIENT
Start: 2024-10-28 | End: 2025-03-27

## 2024-10-28 RX ORDER — OXYCODONE AND ACETAMINOPHEN 10; 325 MG/1; MG/1
1 TABLET ORAL 2 TIMES DAILY
Qty: 60 TABLET | Refills: 0 | Status: SHIPPED | OUTPATIENT
Start: 2024-10-28 | End: 2024-11-27

## 2024-10-28 RX ORDER — BETAMETHASONE SODIUM PHOSPHATE AND BETAMETHASONE ACETATE 3; 3 MG/ML; MG/ML
18 INJECTION, SUSPENSION INTRA-ARTICULAR; INTRALESIONAL; INTRAMUSCULAR; SOFT TISSUE
Status: COMPLETED | OUTPATIENT
Start: 2024-10-28 | End: 2024-10-28

## 2024-10-28 RX ADMIN — KETOROLAC TROMETHAMINE 60 MG: 30 INJECTION, SOLUTION INTRAMUSCULAR at 04:10

## 2024-10-28 RX ADMIN — BETAMETHASONE SODIUM PHOSPHATE AND BETAMETHASONE ACETATE 18 MG: 3; 3 INJECTION, SUSPENSION INTRA-ARTICULAR; INTRALESIONAL; INTRAMUSCULAR at 04:10

## 2024-10-31 ENCOUNTER — LAB VISIT (OUTPATIENT)
Dept: LAB | Facility: HOSPITAL | Age: 49
End: 2024-10-31
Attending: PODIATRIST
Payer: MEDICARE

## 2024-10-31 ENCOUNTER — TELEPHONE (OUTPATIENT)
Dept: PODIATRY | Facility: CLINIC | Age: 49
End: 2024-10-31
Payer: MEDICARE

## 2024-10-31 DIAGNOSIS — M79.2 NEURITIS: ICD-10-CM

## 2024-10-31 LAB
AMPHET+METHAMPHET UR QL: NEGATIVE
BARBITURATES UR QL SCN>200 NG/ML: NEGATIVE
BENZODIAZ UR QL SCN>200 NG/ML: NEGATIVE
BZE UR QL SCN: NEGATIVE
CANNABINOIDS UR QL SCN: NEGATIVE
CREAT UR-MCNC: 234.1 MG/DL (ref 23–375)
METHADONE UR QL SCN>300 NG/ML: NEGATIVE
OPIATES UR QL SCN: ABNORMAL
PCP UR QL SCN>25 NG/ML: NEGATIVE
TOXICOLOGY INFORMATION: ABNORMAL

## 2024-10-31 PROCEDURE — 80307 DRUG TEST PRSMV CHEM ANLYZR: CPT | Performed by: PODIATRIST

## 2024-11-04 ENCOUNTER — TELEPHONE (OUTPATIENT)
Dept: PODIATRY | Facility: CLINIC | Age: 49
End: 2024-11-04
Payer: MEDICARE

## 2024-11-04 RX ORDER — DICLOFENAC SODIUM 75 MG/1
75 TABLET, DELAYED RELEASE ORAL 2 TIMES DAILY
Qty: 60 TABLET | Refills: 3 | Status: SHIPPED | OUTPATIENT
Start: 2024-11-04 | End: 2025-03-04

## 2024-11-04 NOTE — TELEPHONE ENCOUNTER
----- Message from Maryam sent at 11/4/2024  2:48 PM CST -----  Regarding: call back  Contact: pt  Type: Needs Medical Advice  Who Called:  patient   Symptoms (please be specific):    How long has patient had these symptoms:    Pharmacy name and phone #:    Best Call Back Number: [phone]    Additional Information: pt is having an reaction from the medication dr chang prescribed are u available to assist MRN: [id number] FABIANA PALACIOS    \

## 2024-11-04 NOTE — TELEPHONE ENCOUNTER
Patient was having reaction to celebrex. Side effects were nausea, diarrhea, and headache. He stopped taking it and side effects went away. Patient wanted to know if there is something else he can take. Please advise.

## 2024-11-25 ENCOUNTER — OFFICE VISIT (OUTPATIENT)
Dept: PODIATRY | Facility: CLINIC | Age: 49
End: 2024-11-25
Payer: MEDICARE

## 2024-11-25 VITALS
HEART RATE: 74 BPM | SYSTOLIC BLOOD PRESSURE: 162 MMHG | DIASTOLIC BLOOD PRESSURE: 98 MMHG | HEIGHT: 72 IN | BODY MASS INDEX: 26.31 KG/M2 | WEIGHT: 194.25 LBS

## 2024-11-25 DIAGNOSIS — M79.671 CHRONIC PAIN IN RIGHT FOOT: ICD-10-CM

## 2024-11-25 DIAGNOSIS — M76.70 PERONEAL TENDONITIS, UNSPECIFIED LATERALITY: ICD-10-CM

## 2024-11-25 DIAGNOSIS — G89.29 CHRONIC PAIN IN RIGHT FOOT: ICD-10-CM

## 2024-11-25 DIAGNOSIS — M79.2 NEURITIS: Primary | ICD-10-CM

## 2024-11-25 DIAGNOSIS — D36.10 NEUROMA: ICD-10-CM

## 2024-11-25 PROCEDURE — 99214 OFFICE O/P EST MOD 30 MIN: CPT | Mod: PBBFAC | Performed by: PODIATRIST

## 2024-11-25 PROCEDURE — 99213 OFFICE O/P EST LOW 20 MIN: CPT | Mod: S$PBB,,, | Performed by: PODIATRIST

## 2024-11-25 PROCEDURE — 99999 PR PBB SHADOW E&M-EST. PATIENT-LVL IV: CPT | Mod: PBBFAC,,, | Performed by: PODIATRIST

## 2024-11-25 RX ORDER — OXYCODONE AND ACETAMINOPHEN 10; 325 MG/1; MG/1
1 TABLET ORAL 2 TIMES DAILY
Qty: 60 TABLET | Refills: 0 | Status: SHIPPED | OUTPATIENT
Start: 2024-11-27 | End: 2024-12-27

## 2024-11-25 RX ORDER — ESZOPICLONE 3 MG/1
3 TABLET, FILM COATED ORAL
COMMUNITY
Start: 2024-11-19

## 2024-11-26 PROBLEM — D36.10 NEUROMA: Status: ACTIVE | Noted: 2024-11-26

## 2024-11-26 NOTE — PROGRESS NOTES
Subjective:       Patient ID: Sg Sesay is a 48 y.o. male.    Chief Complaint: Neuritis and Toe Pain (5th toe, right foot)     Patient presents today for follow-up he previously had a bone resection and a tendon transfer on the lateral portion of the patient's right foot.  Patient is currently under my care for pain management.   Foot Pain  Associated symptoms include joint swelling.   Follow-up  Associated symptoms include joint swelling.   Medication Refill  Associated symptoms include joint swelling.   Toe Pain     Ankle Pain     Foot Injury        Review of Systems   Musculoskeletal:  Positive for back pain and joint swelling.   All other systems reviewed and are negative.      Objective:      Physical Exam  Vitals and nursing note reviewed.   Constitutional:       Appearance: Normal appearance. He is well-developed.   Cardiovascular:      Rate and Rhythm: Normal rate and regular rhythm.      Pulses: Normal pulses.           Dorsalis pedis pulses are 2+ on the right side and 2+ on the left side.        Posterior tibial pulses are 2+ on the right side and 2+ on the left side.      Heart sounds: Normal heart sounds.   Pulmonary:      Effort: Pulmonary effort is normal.      Breath sounds: Normal breath sounds.   Musculoskeletal:         General: Swelling and tenderness present.      Right foot: Decreased range of motion. Deformity present.   Feet:      Right foot:      Protective Sensation: 4 sites tested.  4 sites sensed.      Left foot:      Protective Sensation: 4 sites tested.  4 sites sensed.   Skin:     General: Skin is warm.      Capillary Refill: Capillary refill takes less than 2 seconds.   Neurological:      General: No focal deficit present.      Mental Status: He is alert.   Psychiatric:         Mood and Affect: Mood normal.         Behavior: Behavior normal.         Thought Content: Thought content normal.         Judgment: Judgment normal.                                                                   Assessment:       1. Neuritis    2. Chronic pain in right foot    3. Neuroma    4. Peroneal tendonitis, unspecified laterality          Plan:       Patient presents today for follow-up he previously had a bone resection and a tendon transfer on the lateral portion of the patient's right foot.  Patient is currently under my care for pain management.  Patient relates in addition to his usual pain and discomfort involving both feet he states he is having an area of burning shooting stabbing hot pain between the 4th and 5th digits on the right foot these findings are consistent with neuroma I did discuss an explain neuroma at length and in detail with the patient today.  Patient has chronic nerve related symptoms in addition to the newly diagnosed neuroma I did advised the patient that I would recommend utilizing a metatarsal pad to offload pressure from this area.  Patient states it feels as if something is breaking in his forefoot when he steps the pain is sharp from time to time he states the IM injections that we gave him on his last visit were definitely helpful I have advised him that the metatarsal pads should help to offload pressure from this area we will see how well he responds to this we can always adjust the pad going with a more aggressive pad if necessary.  Patient is currently under my care for pain management he has a pain management contract on file and an updated drug screen on file is currently taking Percocet which was prescribed to him today he is also taking diclofenac IE was previously taking Celebrex however I felt the diclofenac would be more helpful.  Patient continues to take Lyrica he states he does not think he could function without the Lyrica because of his nerve related symptoms.  Plan follow-up will be 1 month for re-evaluation unless the patient has any problems questions or concerns sooner.  Certainly I feel that a lot of the  patient's discomfort muscle aches pains neuritis may be related additionally to his MS.  This note was created using [employer] voice recognition software that occasionally misinterpreted phrases or words.Patient's insurance has approved the use of Qutenza.  Will schedule at the patient's convenience.

## 2024-12-16 ENCOUNTER — PATIENT MESSAGE (OUTPATIENT)
Dept: PSYCHIATRY | Facility: CLINIC | Age: 49
End: 2024-12-16
Payer: MEDICARE

## 2024-12-23 ENCOUNTER — OFFICE VISIT (OUTPATIENT)
Dept: PODIATRY | Facility: CLINIC | Age: 49
End: 2024-12-23
Payer: MEDICARE

## 2024-12-23 VITALS
DIASTOLIC BLOOD PRESSURE: 93 MMHG | BODY MASS INDEX: 26.31 KG/M2 | HEART RATE: 84 BPM | SYSTOLIC BLOOD PRESSURE: 150 MMHG | WEIGHT: 194.25 LBS | HEIGHT: 72 IN

## 2024-12-23 DIAGNOSIS — G89.29 CHRONIC PAIN IN RIGHT FOOT: ICD-10-CM

## 2024-12-23 DIAGNOSIS — M79.2 NEURITIS: ICD-10-CM

## 2024-12-23 DIAGNOSIS — M72.2 PLANTAR FASCIITIS: Primary | ICD-10-CM

## 2024-12-23 DIAGNOSIS — G35 MS (MULTIPLE SCLEROSIS): ICD-10-CM

## 2024-12-23 DIAGNOSIS — Z02.89 PAIN MANAGEMENT CONTRACT AGREEMENT: ICD-10-CM

## 2024-12-23 DIAGNOSIS — M79.671 CHRONIC PAIN IN RIGHT FOOT: ICD-10-CM

## 2024-12-23 DIAGNOSIS — M79.671 FOOT PAIN, RIGHT: ICD-10-CM

## 2024-12-23 PROCEDURE — 99213 OFFICE O/P EST LOW 20 MIN: CPT | Mod: PBBFAC | Performed by: PODIATRIST

## 2024-12-23 PROCEDURE — 99999 PR PBB SHADOW E&M-EST. PATIENT-LVL III: CPT | Mod: PBBFAC,,, | Performed by: PODIATRIST

## 2024-12-23 RX ORDER — CELECOXIB 200 MG/1
200 CAPSULE ORAL 2 TIMES DAILY
COMMUNITY
Start: 2024-11-25

## 2024-12-23 RX ORDER — OXYCODONE AND ACETAMINOPHEN 10; 325 MG/1; MG/1
1 TABLET ORAL 2 TIMES DAILY
Qty: 60 TABLET | Refills: 0 | Status: SHIPPED | OUTPATIENT
Start: 2024-12-23 | End: 2025-01-22

## 2024-12-23 RX ORDER — DIAZEPAM 5 MG/1
5 TABLET ORAL
COMMUNITY
Start: 2024-11-26

## 2024-12-25 NOTE — PROGRESS NOTES
Subjective:       Patient ID: Sg Sesay is a 49 y.o. male.    Chief Complaint: Neuritis and Neuroma     Patient presents today for follow-up he previously had a bone resection and a tendon transfer on the lateral portion of the patient's right foot.  Patient is currently under my care for pain management.  Patient has a history of MS with generalized body aches and pain.   Foot Pain  Associated symptoms include joint swelling.   Follow-up  Associated symptoms include joint swelling.   Medication Refill  Associated symptoms include joint swelling.   Toe Pain     Ankle Pain     Foot Injury        Review of Systems   Musculoskeletal:  Positive for back pain and joint swelling.   All other systems reviewed and are negative.      Objective:      Physical Exam  Vitals and nursing note reviewed.   Constitutional:       Appearance: Normal appearance. He is well-developed.   Cardiovascular:      Rate and Rhythm: Normal rate and regular rhythm.      Pulses: Normal pulses.           Dorsalis pedis pulses are 2+ on the right side and 2+ on the left side.        Posterior tibial pulses are 2+ on the right side and 2+ on the left side.      Heart sounds: Normal heart sounds.   Pulmonary:      Effort: Pulmonary effort is normal.      Breath sounds: Normal breath sounds.   Musculoskeletal:         General: Swelling and tenderness present.      Right foot: Decreased range of motion. Deformity present.   Feet:      Right foot:      Protective Sensation: 4 sites tested.  4 sites sensed.      Left foot:      Protective Sensation: 4 sites tested.  4 sites sensed.   Skin:     General: Skin is warm.      Capillary Refill: Capillary refill takes less than 2 seconds.   Neurological:      General: No focal deficit present.      Mental Status: He is alert.   Psychiatric:         Mood and Affect: Mood normal.         Behavior: Behavior normal.         Thought Content: Thought content normal.         Judgment: Judgment normal.                                                                                 Assessment:       1. Plantar fasciitis - Left Foot    2. Neuritis    3. Chronic pain in right foot    4. Pain management contract agreement    5. Foot pain, right    6. MS (multiple sclerosis)          Plan:       Patient presents today for follow-up he previously had a bone resection and a tendon transfer on the lateral portion of the patient's right foot.  Patient is currently under my care for pain management.  Patient relates in addition to his usual pain and discomfort involving both feet he states he is having an area of burning shooting stabbing hot pain between the 4th and 5th digits on the right foot these findings are consistent with neuroma I did discuss an explain neuroma at length and in detail with the patient today.  Patient also indicated that he gets a buzzing type feeling in both feet this sounds nerve related and he states it does cause him discomfort.  Patient has chronic nerve related symptoms in addition to the newly diagnosed neuroma I did advised the patient that I would recommend utilizing a metatarsal pad to offload pressure from this area.  Patient states it feels as if something is breaking in his forefoot when he steps the pain is sharp from time to time he states the IM injections that we gave him on his last visit were definitely helpful I have advised him that the metatarsal pads should help to offload pressure from this area we will see how well he responds to this we can always adjust the pad going with a more aggressive pad if necessary.  Patient states he thinks may have helped a little bit he states he thinks it has shifted on him some maybe it was not in the right place I did advise going with a more aggressive metatarsal pad to try to offload even more pressure from the patient's forefoot were going to try some additional more aggressive pads we will see how he does with these and how he tolerates these.   Patient was dispensed several different types of pads that he can try.  Patient has been approved for the use of Qutenza and I advised the patient I really feel that he would benefit from this greatly to settle down the nerve related symptoms in his feet especially on the patient's right foot.  Patient is currently under my care for pain management he has a pain management contract on file and an updated drug screen on file is currently taking Percocet which was prescribed to him today he is also taking diclofenac and was previously taking Celebrex however I felt the diclofenac would be more helpful.  Patient continues to take Lyrica he states he does not think he could function without the Lyrica because of his nerve related symptoms.  I did advised today the patient's Qutenza has been approved so will go ahead and set set up an appointment before the end of the year to get the supplied patient understands this can real pain for months movement.  This note was created using [employer] voice recognition software that occasionally misinterpreted phrases or words.      35 minutes of total time spent on the encounter, which includes face to face time and non-face to face time preparing to see the patient (eg, review of tests), Obtaining and/or reviewing separately obtained history, Documenting clinical information in the electronic or other health record, Independently interpreting results (not separately reported) and communicating results to the patient/family/caregiver, or Care coordination (not separately reported).

## 2024-12-31 ENCOUNTER — OFFICE VISIT (OUTPATIENT)
Dept: PODIATRY | Facility: CLINIC | Age: 49
End: 2024-12-31
Payer: MEDICARE

## 2024-12-31 VITALS
HEIGHT: 72 IN | DIASTOLIC BLOOD PRESSURE: 85 MMHG | SYSTOLIC BLOOD PRESSURE: 131 MMHG | BODY MASS INDEX: 26.31 KG/M2 | WEIGHT: 194.25 LBS | HEART RATE: 67 BPM

## 2024-12-31 DIAGNOSIS — G89.29 CHRONIC PAIN IN RIGHT FOOT: ICD-10-CM

## 2024-12-31 DIAGNOSIS — M79.2 NEURITIS: ICD-10-CM

## 2024-12-31 DIAGNOSIS — M79.672 FOOT PAIN, BILATERAL: ICD-10-CM

## 2024-12-31 DIAGNOSIS — D36.10 NEUROMA: ICD-10-CM

## 2024-12-31 DIAGNOSIS — M79.2 NEURITIS: Primary | ICD-10-CM

## 2024-12-31 DIAGNOSIS — M79.671 FOOT PAIN, BILATERAL: ICD-10-CM

## 2024-12-31 DIAGNOSIS — M79.671 CHRONIC PAIN IN RIGHT FOOT: ICD-10-CM

## 2024-12-31 DIAGNOSIS — G57.51 TARSAL TUNNEL SYNDROME OF RIGHT SIDE: ICD-10-CM

## 2024-12-31 PROCEDURE — 99999 PR PBB SHADOW E&M-EST. PATIENT-LVL IV: CPT | Mod: PBBFAC,,, | Performed by: PODIATRIST

## 2024-12-31 PROCEDURE — 99214 OFFICE O/P EST MOD 30 MIN: CPT | Mod: PBBFAC,PN | Performed by: PODIATRIST

## 2024-12-31 RX ORDER — OXYCODONE AND ACETAMINOPHEN 10; 325 MG/1; MG/1
1 TABLET ORAL 2 TIMES DAILY
Qty: 60 TABLET | Refills: 0 | OUTPATIENT
Start: 2024-12-31 | End: 2025-01-30

## 2024-12-31 NOTE — TELEPHONE ENCOUNTER
Refill request for oxycodone-acetaminophen  mg sent to [facility] in [place] on [address].     Last office visit was 12/31/2024.    SYLVIA Gonzalez 12/31/2024

## 2024-12-31 NOTE — TELEPHONE ENCOUNTER
----- Message from Kristina sent at 12/31/2024 12:33 PM CST -----  Contact: self  Type:  RX Refill Request    Who Called: pt    Refill or New Rx:refill    RX Name and Strength:oxyCODONE-acetaminophen (PERCOCET)  mg per tablet    How is the patient currently taking it? (ex. 1XDay):as directed     Is this a 30 day or 90 day RX:90    Preferred Pharmacy with phone number:  [facility] [address] [address]  [address] [place] [address]  Phone: [phone] Fax: [phone]      Local or Mail Order:local    Ordering Provider: bernardo    Would the patient rather a call back or a response via [facility]? Call back     Best Call Back Number:[phone]      Additional Information:    Please call back to advise. Thanks!

## 2024-12-31 NOTE — PROGRESS NOTES
Subjective:       Patient ID: Sg Sesay is a 49 y.o. male.    Chief Complaint: Heel Pain (Plantar fascitits b/l )     Patient presents today for follow-up he previously had a bone resection and a tendon transfer on the lateral portion of the patient's right foot.  Patient is currently under my care for pain management.  Patient has a history of MS with generalized body aches and pain.  Patient has nerve related symptoms bilateral with most recent area of neuroma on the plantar lateral aspect of the patient's right foot underlying the 4th webspace.   Foot Pain  Associated symptoms include joint swelling.   Follow-up  Associated symptoms include joint swelling.   Medication Refill  Associated symptoms include joint swelling.   Toe Pain     Ankle Pain     Foot Injury        Review of Systems   Musculoskeletal:  Positive for back pain and joint swelling.   All other systems reviewed and are negative.      Objective:      Physical Exam  Vitals and nursing note reviewed.   Constitutional:       Appearance: Normal appearance. He is well-developed.   Cardiovascular:      Rate and Rhythm: Normal rate and regular rhythm.      Pulses: Normal pulses.           Dorsalis pedis pulses are 2+ on the right side and 2+ on the left side.        Posterior tibial pulses are 2+ on the right side and 2+ on the left side.      Heart sounds: Normal heart sounds.   Pulmonary:      Effort: Pulmonary effort is normal.      Breath sounds: Normal breath sounds.   Musculoskeletal:         General: Swelling and tenderness present.      Right foot: Decreased range of motion. Deformity present.   Feet:      Right foot:      Protective Sensation: 4 sites tested.  4 sites sensed.      Left foot:      Protective Sensation: 4 sites tested.  4 sites sensed.   Skin:     General: Skin is warm.      Capillary Refill: Capillary refill takes less than 2 seconds.   Neurological:      General: No focal deficit present.      Mental Status: He is alert.    Psychiatric:         Mood and Affect: Mood normal.         Behavior: Behavior normal.         Thought Content: Thought content normal.         Judgment: Judgment normal.            Assessment:       1. Neuritis    2. Tarsal tunnel syndrome of right side    3. Neuroma    4. Foot pain, bilateral          Plan:       Patient presents today for follow-up he previously had a bone resection and a tendon transfer on the lateral portion of the patient's right foot.  Patient is currently under my care for pain management.  Patient has a history of MS with generalized body aches and pain.  Patient has nerve related symptoms bilateral with most recent area of neuroma on the plantar lateral aspect of the patient's right foot underlying the 4th webspace.Patient presents today for follow-up he previously had a bone resection and a tendon transfer on the lateral portion of the patient's right foot.  Patient relates in addition to his usual pain and discomfort involving both feet he states he is having an area of burning shooting stabbing hot pain between the 4th and 5th digits on the right foot these findings are consistent with neuroma I did discuss an explain neuroma at length and in detail with the patient today.  Patient also indicated that he gets a buzzing type feeling in both feet this sounds nerve related and he states it does cause him discomfort.  Patient has chronic nerve related symptoms in addition to the newly diagnosed neuroma I did advised the patient that I would recommend utilizing a metatarsal pad to offload pressure from this area.  Patient has been approved for the use of Qutenza and I advised the patient I really feel that he would benefit from this greatly to settle down the nerve related symptoms in his feet especially on the patient's right foot.  Patient is currently under my care for pain management he has a pain management contract on file.   Patient continues to take Lyrica he states he does not  think he could function without the Lyrica because of his nerve related symptoms.  Patient's pain medication was sent in on his last visit he should have an adequate supply to last him until the end of January.  Patient was evaluated and examined prior to application of the Qutenza today. This note was created using [employer] voice recognition software that occasionally misinterpreted phrases or words.      20 minutes of total time spent on the encounter, which includes face to face time and non-face to face time preparing to see the patient (eg, review of tests), Obtaining and/or reviewing separately obtained history, Documenting clinical information in the electronic or other health record, Independently interpreting results (not separately reported) and communicating results to the patient/family/caregiver, or Care coordination (not separately reported).        Qutenza sheets applied to the dorsal and plantar aspect of both feet 4 sheets totaling, 1120 sq cm, wrapped and left intact for 30 minutes with no side effects.    Removed and cooling gel applied.  Patient was performed per manufacture guidelines.

## 2025-01-02 DIAGNOSIS — M79.2 NEURITIS: ICD-10-CM

## 2025-01-02 DIAGNOSIS — G89.29 CHRONIC PAIN IN RIGHT FOOT: ICD-10-CM

## 2025-01-02 DIAGNOSIS — M79.671 CHRONIC PAIN IN RIGHT FOOT: ICD-10-CM

## 2025-01-02 RX ORDER — OXYCODONE AND ACETAMINOPHEN 10; 325 MG/1; MG/1
1 TABLET ORAL 2 TIMES DAILY
Qty: 60 TABLET | Refills: 0 | Status: SHIPPED | OUTPATIENT
Start: 2025-01-02 | End: 2025-02-01

## 2025-01-03 ENCOUNTER — TELEPHONE (OUTPATIENT)
Dept: PODIATRY | Facility: CLINIC | Age: 50
End: 2025-01-03
Payer: MEDICARE

## 2025-01-03 NOTE — TELEPHONE ENCOUNTER
----- Message from Jah Torres DPM sent at 1/2/2025  5:05 PM CST -----  Contact: self  New Rx sent! That's BS on their end system doesn't allow it to be sent without the over 7 days being checked.  ----- Message -----  From: Lisa Farias LPN  Sent: 1/2/2025   4:28 PM CST  To: Jah Torres DPM    Please advise.     Thank you,   SYVLIA Gonzalez  ----- Message -----  From: Lexi Webb  Sent: 1/2/2025   4:27 PM CST  To: Brian Tyson Staff    The script sent over on 12/23/24  for the patients oxyCODONE-acetaminophen (PERCOCET)  mg per tablet did not have the over 7 days legal verbage on it so the pharm is telling him it was not sent.  Please ask the doctor to send it over with the corrected  over 7 day verbage on it and then call pt back to advise at [phone] and thanks .

## 2025-01-03 NOTE — TELEPHONE ENCOUNTER
No answer but left a message for patient notifying him that the prescription has been corrected and sent to the pharmacy. SYLVIA Gonzalez 01/03/2024

## 2025-01-27 ENCOUNTER — OFFICE VISIT (OUTPATIENT)
Dept: PODIATRY | Facility: CLINIC | Age: 50
End: 2025-01-27
Payer: MEDICARE

## 2025-01-27 VITALS
HEART RATE: 72 BPM | SYSTOLIC BLOOD PRESSURE: 166 MMHG | WEIGHT: 194.25 LBS | HEIGHT: 72 IN | BODY MASS INDEX: 26.31 KG/M2 | DIASTOLIC BLOOD PRESSURE: 89 MMHG

## 2025-01-27 DIAGNOSIS — M25.571 PAIN, JOINT, FOOT, RIGHT: ICD-10-CM

## 2025-01-27 DIAGNOSIS — M79.671 CHRONIC PAIN IN RIGHT FOOT: ICD-10-CM

## 2025-01-27 DIAGNOSIS — G35 MS (MULTIPLE SCLEROSIS): ICD-10-CM

## 2025-01-27 DIAGNOSIS — G89.29 CHRONIC PAIN IN RIGHT FOOT: ICD-10-CM

## 2025-01-27 DIAGNOSIS — M79.2 NEURITIS: Primary | ICD-10-CM

## 2025-01-27 PROCEDURE — 99214 OFFICE O/P EST MOD 30 MIN: CPT | Mod: PBBFAC | Performed by: PODIATRIST

## 2025-01-27 PROCEDURE — 99213 OFFICE O/P EST LOW 20 MIN: CPT | Mod: S$PBB,,, | Performed by: PODIATRIST

## 2025-01-27 PROCEDURE — 99999 PR PBB SHADOW E&M-EST. PATIENT-LVL IV: CPT | Mod: PBBFAC,,, | Performed by: PODIATRIST

## 2025-01-28 NOTE — PROGRESS NOTES
Subjective:       Patient ID: Sg Sesay is a 49 y.o. male.    Chief Complaint: Foot Pain (Bilateral ) and Neuritis     Patient presents today for follow-up he previously had a bone resection and a tendon transfer on the lateral portion of the patient's right foot.  Patient is currently under my care for pain management.  Patient has a history of MS with generalized body aches and pain.  Patient has nerve related symptoms bilateral with most recent area of neuroma on the plantar lateral aspect of the patient's right foot underlying the 4th webspace.   Foot Pain  Associated symptoms include joint swelling.   Follow-up  Associated symptoms include joint swelling.   Medication Refill  Associated symptoms include joint swelling.   Toe Pain     Ankle Pain     Foot Injury        Review of Systems   Musculoskeletal:  Positive for back pain and joint swelling.   All other systems reviewed and are negative.      Objective:      Physical Exam  Vitals and nursing note reviewed.   Constitutional:       Appearance: Normal appearance. He is well-developed.   Cardiovascular:      Rate and Rhythm: Normal rate and regular rhythm.      Pulses: Normal pulses.           Dorsalis pedis pulses are 2+ on the right side and 2+ on the left side.        Posterior tibial pulses are 2+ on the right side and 2+ on the left side.      Heart sounds: Normal heart sounds.   Pulmonary:      Effort: Pulmonary effort is normal.      Breath sounds: Normal breath sounds.   Musculoskeletal:         General: Swelling and tenderness present.      Right foot: Decreased range of motion. Deformity present.   Feet:      Right foot:      Protective Sensation: 4 sites tested.  4 sites sensed.      Left foot:      Protective Sensation: 4 sites tested.  4 sites sensed.   Skin:     General: Skin is warm.      Capillary Refill: Capillary refill takes less than 2 seconds.   Neurological:      General: No focal deficit present.      Mental Status: He is  alert.   Psychiatric:         Mood and Affect: Mood normal.         Behavior: Behavior normal.         Thought Content: Thought content normal.         Judgment: Judgment normal.                                      Assessment:       1. Neuritis    2. Pain, joint, foot, right    3. Chronic pain in right foot    4. MS (multiple sclerosis)          Plan:       Patient presents today for follow-up he previously had a bone resection and a tendon transfer on the lateral portion of the patient's right foot.  Patient is currently under my care for pain management.  Patient has a history of MS with generalized body aches and pain.  Patient has nerve related symptoms bilateral with most recent area of neuroma on the plantar lateral aspect of the patient's right foot underlying the 4th webspace.Patient presents today for follow-up he previously had a bone resection and a tendon transfer on the lateral portion of the patient's right foot.  Patient relates in addition to his usual pain and discomfort involving both feet he states he is having an area of burning shooting stabbing hot pain between the 4th and 5th digits on the right foot these findings are consistent with neuroma I did discuss an explain neuroma at length and in detail with the patient today.  Patient also indicated that he gets a buzzing type feeling in both feet this sounds nerve related and he states it does cause him discomfort.  Patient has chronic nerve related symptoms in addition to the newly diagnosed neuroma I did advised the patient that I would recommend utilizing a metatarsal pad to offload pressure from this area.  Patient has been approved for the use of Qutenza and I advised the patient I really feel that he would benefit from this greatly to settle down the nerve related symptoms in his feet especially on the patient's right foot.  Patient is currently under my care for pain management he has a pain management contract on file.   Patient  continues to take Lyrica he states he does not think he could function without the Lyrica because of his nerve related symptoms. We previously applied the Qutenza to both lower extremities in hopes that this would help with the patient's nerve related symptoms however the patient states it actually caused severe burning pain and discomfort in both feet for 72 hours.  Patient states he tried everything and could including soaking his feet in milk to subside the severe burning and discomfort he was having clearly the patient did not tolerate the medication well.  We certainly will not be re-applying this.  Patient dispensed a prescription today for pain medication we will have to continue pain medication with Lyrica to manage the patient's discomfort he does have a bulge overlying the lateral portion of the right foot this is something we will have to monitor certainly if it becomes larger we may have to do some additional imaging studies.  I do believe the issues that the patient had with the application of Qutenza is likely related to his history of MS.  This note was created using [employer] voice recognition software that occasionally misinterpreted phrases or words.

## 2025-01-29 ENCOUNTER — TELEPHONE (OUTPATIENT)
Dept: PODIATRY | Facility: CLINIC | Age: 50
End: 2025-01-29
Payer: MEDICARE

## 2025-01-29 ENCOUNTER — LAB VISIT (OUTPATIENT)
Dept: LAB | Facility: HOSPITAL | Age: 50
End: 2025-01-29
Attending: PODIATRIST
Payer: MEDICARE

## 2025-01-29 DIAGNOSIS — M79.2 NEURITIS: ICD-10-CM

## 2025-01-29 LAB
AMPHET+METHAMPHET UR QL: NEGATIVE
BARBITURATES UR QL SCN>200 NG/ML: NEGATIVE
BENZODIAZ UR QL SCN>200 NG/ML: NEGATIVE
BZE UR QL SCN: NEGATIVE
CANNABINOIDS UR QL SCN: NEGATIVE
CREAT UR-MCNC: 318.8 MG/DL (ref 23–375)
METHADONE UR QL SCN>300 NG/ML: NEGATIVE
OPIATES UR QL SCN: ABNORMAL
PCP UR QL SCN>25 NG/ML: NEGATIVE
TOXICOLOGY INFORMATION: ABNORMAL

## 2025-01-29 PROCEDURE — 80307 DRUG TEST PRSMV CHEM ANLYZR: CPT | Performed by: PODIATRIST

## 2025-01-29 RX ORDER — OXYCODONE AND ACETAMINOPHEN 10; 325 MG/1; MG/1
1 TABLET ORAL 2 TIMES DAILY
Qty: 60 TABLET | Refills: 0 | Status: SHIPPED | OUTPATIENT
Start: 2025-01-29 | End: 2025-02-28

## 2025-01-29 NOTE — TELEPHONE ENCOUNTER
----- Message from Jah Torres DPM sent at 1/29/2025 12:59 PM CST -----  Please advised the patient is drug screen is fine his prescription has been sent.  ----- Message -----  From: [employer]  Sent: 1/29/2025  12:23 PM CST  To: Jah Torres DPM

## 2025-02-24 ENCOUNTER — OFFICE VISIT (OUTPATIENT)
Dept: PODIATRY | Facility: CLINIC | Age: 50
End: 2025-02-24
Payer: MEDICARE

## 2025-02-24 VITALS
WEIGHT: 194.25 LBS | BODY MASS INDEX: 26.31 KG/M2 | SYSTOLIC BLOOD PRESSURE: 152 MMHG | DIASTOLIC BLOOD PRESSURE: 93 MMHG | HEART RATE: 73 BPM | HEIGHT: 72 IN

## 2025-02-24 DIAGNOSIS — M54.30 SCIATIC LEG PAIN: ICD-10-CM

## 2025-02-24 DIAGNOSIS — M79.671 CHRONIC PAIN IN RIGHT FOOT: ICD-10-CM

## 2025-02-24 DIAGNOSIS — M79.2 NEURITIS: Primary | ICD-10-CM

## 2025-02-24 DIAGNOSIS — G89.29 CHRONIC PAIN IN RIGHT FOOT: ICD-10-CM

## 2025-02-24 DIAGNOSIS — M25.571 PAIN, JOINT, FOOT, RIGHT: ICD-10-CM

## 2025-02-24 PROCEDURE — 99999 PR PBB SHADOW E&M-EST. PATIENT-LVL IV: CPT | Mod: PBBFAC,,, | Performed by: PODIATRIST

## 2025-02-24 PROCEDURE — 96372 THER/PROPH/DIAG INJ SC/IM: CPT | Mod: PBBFAC

## 2025-02-24 PROCEDURE — 99999PBSHW PR PBB SHADOW TECHNICAL ONLY FILED TO HB: Mod: JZ,PBBFAC,,

## 2025-02-24 PROCEDURE — 99214 OFFICE O/P EST MOD 30 MIN: CPT | Mod: PBBFAC | Performed by: PODIATRIST

## 2025-02-24 RX ORDER — OXYCODONE AND ACETAMINOPHEN 10; 325 MG/1; MG/1
1 TABLET ORAL 2 TIMES DAILY
Qty: 60 TABLET | Refills: 0 | Status: SHIPPED | OUTPATIENT
Start: 2025-02-24 | End: 2025-03-26

## 2025-02-24 RX ORDER — BETAMETHASONE SODIUM PHOSPHATE AND BETAMETHASONE ACETATE 3; 3 MG/ML; MG/ML
12 INJECTION, SUSPENSION INTRA-ARTICULAR; INTRALESIONAL; INTRAMUSCULAR; SOFT TISSUE
Status: COMPLETED | OUTPATIENT
Start: 2025-02-24 | End: 2025-02-24

## 2025-02-24 RX ORDER — KETOROLAC TROMETHAMINE 30 MG/ML
60 INJECTION, SOLUTION INTRAMUSCULAR; INTRAVENOUS
Status: COMPLETED | OUTPATIENT
Start: 2025-02-24 | End: 2025-02-24

## 2025-02-24 RX ADMIN — KETOROLAC TROMETHAMINE 60 MG: 30 INJECTION, SOLUTION INTRAMUSCULAR at 09:02

## 2025-02-24 RX ADMIN — BETAMETHASONE SODIUM PHOSPHATE AND BETAMETHASONE ACETATE 12 MG: 3; 3 INJECTION, SUSPENSION INTRA-ARTICULAR; INTRALESIONAL; INTRAMUSCULAR at 09:02

## 2025-02-25 NOTE — PROGRESS NOTES
Subjective:       Patient ID: Sg Sesay is a 49 y.o. male.    Chief Complaint: Foot Pain     Patient presents today for follow-up he previously had a bone resection and a tendon transfer on the lateral portion of the patient's right foot.  Patient is currently under my care for pain management.  Patient has a history of MS with generalized body aches and pain.  Patient has nerve related symptoms bilateral with most recent area of neuroma on the plantar lateral aspect of the patient's right foot underlying the 4th webspace.   Foot Pain  Associated symptoms include joint swelling.   Follow-up  Associated symptoms include joint swelling.   Medication Refill  Associated symptoms include joint swelling.   Toe Pain     Ankle Pain     Foot Injury        Review of Systems   Musculoskeletal:  Positive for back pain and joint swelling.   All other systems reviewed and are negative.      Objective:      Physical Exam  Vitals and nursing note reviewed.   Constitutional:       Appearance: Normal appearance. He is well-developed.   Cardiovascular:      Rate and Rhythm: Normal rate and regular rhythm.      Pulses: Normal pulses.           Dorsalis pedis pulses are 2+ on the right side and 2+ on the left side.        Posterior tibial pulses are 2+ on the right side and 2+ on the left side.      Heart sounds: Normal heart sounds.   Pulmonary:      Effort: Pulmonary effort is normal.      Breath sounds: Normal breath sounds.   Musculoskeletal:         General: Swelling and tenderness present.      Right foot: Decreased range of motion. Deformity present.   Feet:      Right foot:      Protective Sensation: 4 sites tested.  4 sites sensed.      Left foot:      Protective Sensation: 4 sites tested.  4 sites sensed.   Skin:     General: Skin is warm.      Capillary Refill: Capillary refill takes less than 2 seconds.   Neurological:      General: No focal deficit present.      Mental Status: He is alert.   Psychiatric:          Mood and Affect: Mood normal.         Behavior: Behavior normal.         Thought Content: Thought content normal.         Judgment: Judgment normal.                                            Assessment:       1. Neuritis    2. Chronic pain in right foot    3. Sciatic leg pain    4. Pain, joint, foot, right          Plan:       Patient presents today for follow-up he previously had a bone resection and a tendon transfer on the lateral portion of the patient's right foot.  Patient is currently under my care for pain management.  Patient has a history of MS with generalized body aches and pain.  Patient has chronic pain encompassing the right foot he states it really has not changed very much it is constant even when he is not weight-bearing.  Patient states he has recently developed some sciatic nerve pain on the right side which has been going on for the past 2 weeks.  Patient continues to take Lyrica he states he does not think he could function without the Lyrica because of his nerve related symptoms.  Patient dispensed a prescription today for pain medication we will have to continue pain medication with Lyrica to manage the patient's discomfort he does have a bulge overlying the lateral portion of the right foot this is something we will have to monitor certainly if it becomes larger we may have to do some additional imaging studies.  Patient indicated today with out the pain medication and the Lyrica he does not think he would be able to function at all he states he is in enough discomfort on a regular basis but this makes it somewhat tolerable day-to-day.  Patient was administered an IM injection of Celestone right side IM injection of Toradol left for generalized inflammation this may also help the sciatic nerve issues he is having on the right side plan follow-up will be 1 month.  Patient has a current pain management contract on file and the patient's drug screen is up-to-date.  This note was created using  [employer] voice recognition software that occasionally misinterpreted phrases or words.

## 2025-03-24 ENCOUNTER — HOSPITAL ENCOUNTER (EMERGENCY)
Facility: HOSPITAL | Age: 50
Discharge: HOME | End: 2025-03-24
Attending: EMERGENCY MEDICINE
Payer: MEDICARE

## 2025-03-24 ENCOUNTER — OFFICE VISIT (OUTPATIENT)
Dept: PODIATRY | Facility: CLINIC | Age: 50
End: 2025-03-24
Payer: MEDICARE

## 2025-03-24 VITALS
SYSTOLIC BLOOD PRESSURE: 166 MMHG | TEMPERATURE: 99 F | HEART RATE: 82 BPM | OXYGEN SATURATION: 96 % | RESPIRATION RATE: 18 BRPM | HEIGHT: 72 IN | BODY MASS INDEX: 23.7 KG/M2 | WEIGHT: 175 LBS | DIASTOLIC BLOOD PRESSURE: 98 MMHG

## 2025-03-24 VITALS
HEIGHT: 72 IN | WEIGHT: 194.25 LBS | HEART RATE: 70 BPM | DIASTOLIC BLOOD PRESSURE: 96 MMHG | SYSTOLIC BLOOD PRESSURE: 160 MMHG | BODY MASS INDEX: 26.31 KG/M2

## 2025-03-24 DIAGNOSIS — G35 MS (MULTIPLE SCLEROSIS): ICD-10-CM

## 2025-03-24 DIAGNOSIS — M54.31 SCIATICA OF RIGHT SIDE: Primary | ICD-10-CM

## 2025-03-24 DIAGNOSIS — M79.671 CHRONIC PAIN IN RIGHT FOOT: ICD-10-CM

## 2025-03-24 DIAGNOSIS — M79.671 FOOT PAIN, BILATERAL: ICD-10-CM

## 2025-03-24 DIAGNOSIS — M79.2 NEURITIS: Primary | ICD-10-CM

## 2025-03-24 DIAGNOSIS — M79.672 FOOT PAIN, BILATERAL: ICD-10-CM

## 2025-03-24 DIAGNOSIS — G89.29 CHRONIC PAIN IN RIGHT FOOT: ICD-10-CM

## 2025-03-24 DIAGNOSIS — M54.30 SCIATIC LEG PAIN: ICD-10-CM

## 2025-03-24 PROCEDURE — 99214 OFFICE O/P EST MOD 30 MIN: CPT | Mod: PBBFAC,25 | Performed by: PODIATRIST

## 2025-03-24 PROCEDURE — 99213 OFFICE O/P EST LOW 20 MIN: CPT | Mod: S$PBB,,, | Performed by: PODIATRIST

## 2025-03-24 PROCEDURE — 99284 EMERGENCY DEPT VISIT MOD MDM: CPT | Mod: 25,27

## 2025-03-24 PROCEDURE — 63600175 PHARM REV CODE 636 W HCPCS: Mod: JZ,TB | Performed by: NURSE PRACTITIONER

## 2025-03-24 PROCEDURE — 96372 THER/PROPH/DIAG INJ SC/IM: CPT | Performed by: NURSE PRACTITIONER

## 2025-03-24 PROCEDURE — 99999 PR PBB SHADOW E&M-EST. PATIENT-LVL IV: CPT | Mod: PBBFAC,,, | Performed by: PODIATRIST

## 2025-03-24 RX ORDER — METHOCARBAMOL 500 MG/1
1 TABLET, FILM COATED ORAL EVERY 6 HOURS PRN
COMMUNITY
Start: 2025-03-22

## 2025-03-24 RX ORDER — PREGABALIN 200 MG/1
200 CAPSULE ORAL 3 TIMES DAILY
Qty: 90 CAPSULE | Refills: 5 | Status: SHIPPED | OUTPATIENT
Start: 2025-03-24 | End: 2025-09-20

## 2025-03-24 RX ORDER — KETOROLAC TROMETHAMINE 30 MG/ML
60 INJECTION, SOLUTION INTRAMUSCULAR; INTRAVENOUS
Status: COMPLETED | OUTPATIENT
Start: 2025-03-24 | End: 2025-03-24

## 2025-03-24 RX ORDER — OXYCODONE AND ACETAMINOPHEN 10; 325 MG/1; MG/1
1 TABLET ORAL 2 TIMES DAILY
Qty: 60 TABLET | Refills: 0 | Status: SHIPPED | OUTPATIENT
Start: 2025-03-24 | End: 2025-04-23

## 2025-03-24 RX ORDER — NAPROXEN 500 MG/1
500 TABLET ORAL 2 TIMES DAILY WITH MEALS
Qty: 60 TABLET | Refills: 0 | Status: SHIPPED | OUTPATIENT
Start: 2025-03-24 | End: 2025-03-26

## 2025-03-24 RX ORDER — CYCLOBENZAPRINE HCL 10 MG
10 TABLET ORAL 3 TIMES DAILY PRN
Qty: 30 TABLET | Refills: 0 | Status: SHIPPED | OUTPATIENT
Start: 2025-03-24

## 2025-03-24 RX ORDER — METHYLPREDNISOLONE SOD SUCC 125 MG
125 VIAL (EA) INJECTION
Status: COMPLETED | OUTPATIENT
Start: 2025-03-24 | End: 2025-03-24

## 2025-03-24 RX ADMIN — METHYLPREDNISOLONE SODIUM SUCCINATE 125 MG: 125 INJECTION, POWDER, FOR SOLUTION INTRAMUSCULAR; INTRAVENOUS at 12:03

## 2025-03-24 RX ADMIN — KETOROLAC TROMETHAMINE 60 MG: 30 INJECTION, SOLUTION INTRAMUSCULAR; INTRAVENOUS at 12:03

## 2025-03-24 NOTE — ED PROVIDER NOTES
Encounter Date: 3/24/2025       History     Chief Complaint   Patient presents with    Back Pain     Pt. C/o lower back radiating to the left leg x 1.5 months that has worsened the last 2 days. [facility].     POV to ED alone.  Patient complains of right lower back pain that radiates into the right leg for about 1.5 months.  [facility], he has also seen Dr. Torres for similar symptoms. [facility] told him he may need MRI of his back.  States he had an x-ray done at that time, he was told he had arthritis in his back.  Prescribed NSAID and Robaxin.  Reports no relief with this medication.  He is requesting MRI today.  Patient is advised that he will need to follow up with his clinic on a nonemergent outpatient basis for the MRI.  Denies associated symptoms.  No incontinence of urine or stool, no numbness.  No abdominal pain.  No vomiting or diarrhea.  Denies fall or injury.  No other complaints    The history is provided by the patient. No  was used.     Review of patient's allergies indicates:   Allergen Reactions    Iodine and iodide containing products Anaphylaxis    Iodine     Shellfish containing products Nausea And Vomiting     Other reaction(s): NAUSEA,VOMITING     Past Medical History:   Diagnosis Date    Back pain     MS (multiple sclerosis)     Sleep apnea      Past Surgical History:   Procedure Laterality Date    FOOT ARTHRODESIS  2013    FOOT HARDWARE REMOVAL Right 2/2/2024    Procedure: REMOVAL, HARDWARE, FOOT;  Surgeon: Jah Torres DPM;  Location: [facility] OR;  Service: Podiatry;  Laterality: Right;    FUSION OF METATARSOPHALANGEAL JOINT Right 8/12/2022    Procedure: FUSION, MTP JOINT paragon 28 plates and screws;  Surgeon: Jah Torres DPM;  Location: [facility] OR;  Service: Podiatry;  Laterality: Right;    INCISION AND DRAINAGE OF HEMATOMA Right 2/23/2024    Procedure: INCISION AND DRAINAGE,  HEMATOMA;  Surgeon: Jah Torres DPM;  Location: [facility] OR;  Service: Podiatry;  Laterality: Right;    INJECTION OF JOINT Right 5/25/2021    Procedure: Injection, Joint Multiple injections;  Surgeon: Jah Torres DPM;  Location: [facility] OR;  Service: Podiatry;  Laterality: Right;    INJECTION OF JOINT Right 9/28/2021    Procedure: Injection, Joint;  Surgeon: Jah Torres DPM;  Location: [facility] OR;  Service: Podiatry;  Laterality: Right;    INJECTION, TENDON SHEATH OR LIGAMENT, 1 TENDON SHEATH OR LIGAMENT Right 5/25/2021    Procedure: INJECTION,TENDON SHEATH OR LIGAMENT,1 TENDON SHEATH OR LIGAMENT;  Surgeon: Jah Torres DPM;  Location: [facility] OR;  Service: Podiatry;  Laterality: Right;    INJECTION, TENDON SHEATH OR LIGAMENT, 1 TENDON SHEATH OR LIGAMENT Right 9/28/2021    Procedure: INJECTION,TENDON SHEATH OR LIGAMENT,1 TENDON SHEATH OR LIGAMENT;  Surgeon: Jah Torres DPM;  Location: [facility] OR;  Service: Podiatry;  Laterality: Right;    MIDFOOT ARTHRODESIS Right 2/2/2021    Procedure: FUSION, JOINT, MIDFOOT [place] bone wedges and fixation screws & plates;  Surgeon: Jah Torres DPM;  Location: [facility] OR;  Service: Podiatry;  Laterality: Right;    OSTECTOMY OF TARSAL COALITION Right 2/2/2024    Procedure: OSTECTOMY, TARSAL COALITION;  Surgeon: Jah Torres DPM;  Location: [facility] OR;  Service: Podiatry;  Laterality: Right;    PLANTAR FASCIA SURGERY  2012, 2013    RADIOFREQUENCY ABLATION      tarsal tunnel  2012, 2013    TARSAL TUNNEL RELEASE Right 2/2/2021    Procedure: RELEASE, TARSAL TUNNEL Nerve Ablation right;  Surgeon: Jah Torres DPM;  Location: [facility] OR;  Service: Podiatry;  Laterality: Right;    TRANSFER OF TENDON OF LOWER EXTREMITY Right 2/2/2024    Procedure: TRANSFER, TENDON, LOWER EXTREMITY;  Surgeon: Jah Torres DPM;  Location: [facility] OR;  Service: Podiatry;  Laterality: Right;     Family History   Problem Relation Name Age of Onset    Diabetes Neg Hx        Social History[1]  Review of Systems   Constitutional:  Negative for chills and fever.   Gastrointestinal:  Negative for abdominal pain, nausea and vomiting.        No incontinence of urine or stool   Genitourinary:  Negative for difficulty urinating and dysuria.   Musculoskeletal:  Positive for back pain.   Neurological:  Negative for numbness.   All other systems reviewed and are negative.      Physical Exam     Initial Vitals [03/24/25 1102]   BP Pulse Resp Temp SpO2   134/81 84 18 97.9 °F (36.6 °C) 96 %      MAP       --         Physical Exam    Nursing note and vitals reviewed.  Constitutional: He appears well-developed and well-nourished. No distress.   HENT:   Head: Normocephalic and atraumatic. Mouth/Throat: Oropharynx is clear and moist.   Eyes: Pupils are equal, round, and reactive to light.   Neck:   Normal range of motion.  Cardiovascular:  Normal rate and regular rhythm.           Pulmonary/Chest: Breath sounds normal. No respiratory distress.   Abdominal: Abdomen is soft. There is no abdominal tenderness.   Musculoskeletal:      Cervical back: Normal range of motion.      Comments: Point tenderness right SI joint, no rash.  ROM intact but painful.  Steady gait slowly     Neurological: He is alert and oriented to person, place, and time. GCS score is 15. GCS eye subscore is 4. GCS verbal subscore is 5. GCS motor subscore is 6.   Skin: Skin is warm and dry. Capillary refill takes less than 2 seconds.   Psychiatric: He has a normal mood and affect. Thought content normal.         ED Course   Procedures  Labs Reviewed - No data to display       Imaging Results    None          Medications   methylPREDNISolone sodium succinate injection 125 mg (125 mg Intramuscular Given 3/24/25 1213)   ketorolac injection 60 mg (60 mg Intramuscular Given 3/24/25 1213)     Medical Decision Making  Presents for evaluation of back pain, see HPI  Differentials including but not limited to sprain, strain, chronic pain,  radiculopathy, sciatica, spasm  Discharged home, diagnosis sciatica.  Pain control in the ED, prescriptions for home use.  Prescribed Percocet for home use by outpatient clinic.  Instructed to Rest, increase fluids, lots of water and liquids.  Ice treatment and or warm moist heat as needed.  [facility] for [facility], or the [facility] the [facility].  For ongoing symptoms, you may need MRI of the lumbar spine.  This can be ordered by your clinic on a nonemergent outpatient basis.  Medications as prescribed.  Do not take other NSAID products if you are going to try the naproxen.  Continue prescribed Percocet.  If you try the Flexeril, do not take other muscle relaxers at the same time. Agrees with care    Risk  OTC drugs.  Prescription drug management.                                      Clinical Impression:  Final diagnoses:  [M54.31] Sciatica of right side (Primary)          ED Disposition Condition    Discharge Stable          ED Prescriptions       Medication Sig Dispense Start Date End Date Auth. Provider    cyclobenzaprine (FLEXERIL) 10 MG tablet Take 1 tablet (10 mg total) by mouth 3 (three) times daily as needed for Muscle spasms. 30 tablet 3/24/2025 -- Alida Esparza NP    naproxen (NAPROSYN) 500 MG tablet Take 1 tablet (500 mg total) by mouth 2 (two) times daily with meals. 60 tablet 3/24/2025 -- Alida Esparza NP          Follow-up Information       Follow up With Specialties Details Why Contact Info    [place] [facility] -  Call in 3 days  [address]  [place] [address] [phone]                 [1]   Social History  Tobacco Use    Smoking status: Never     Passive exposure: Never    Smokeless tobacco: Never   Substance Use Topics    Alcohol use: No    Drug use: No        Alida Esparza NP  03/24/25 7898     Detail Level: Simple Additional Notes: To freeze areas on face after the holiday 9

## 2025-03-24 NOTE — ED NOTES
Pt states pain is now at a 6/10. Discharge paperwork provided, vitals stable. Pt in no acute distress and agrees with plan of discharge.

## 2025-03-25 NOTE — PROGRESS NOTES
Subjective:       Patient ID: Sg Sesay is a 49 y.o. male.    Chief Complaint: Foot Pain     Patient presents today for follow-up he previously had a bone resection and a tendon transfer on the lateral portion of the patient's right foot.  Patient is currently under my care for pain management.  Patient has a history of MS with generalized body aches and pain.    Foot Pain  Associated symptoms include joint swelling.   Follow-up  Associated symptoms include joint swelling.   Medication Refill  Associated symptoms include joint swelling.   Toe Pain     Ankle Pain     Foot Injury        Review of Systems   Musculoskeletal:  Positive for back pain and joint swelling.   All other systems reviewed and are negative.      Objective:      Physical Exam  Vitals and nursing note reviewed.   Constitutional:       Appearance: Normal appearance. He is well-developed.   Cardiovascular:      Rate and Rhythm: Normal rate and regular rhythm.      Pulses: Normal pulses.           Dorsalis pedis pulses are 2+ on the right side and 2+ on the left side.        Posterior tibial pulses are 2+ on the right side and 2+ on the left side.      Heart sounds: Normal heart sounds.   Pulmonary:      Effort: Pulmonary effort is normal.      Breath sounds: Normal breath sounds.   Musculoskeletal:         General: Swelling and tenderness present.      Right foot: Decreased range of motion. Deformity present.   Feet:      Right foot:      Protective Sensation: 4 sites tested.  4 sites sensed.      Left foot:      Protective Sensation: 4 sites tested.  4 sites sensed.   Skin:     General: Skin is warm.      Capillary Refill: Capillary refill takes less than 2 seconds.   Neurological:      General: No focal deficit present.      Mental Status: He is alert.   Psychiatric:         Mood and Affect: Mood normal.         Behavior: Behavior normal.         Thought Content: Thought content normal.         Judgment: Judgment normal.                                                       Assessment:       1. Neuritis    2. Chronic pain in right foot    3. Sciatic leg pain    4. MS (multiple sclerosis)    5. Foot pain, bilateral          Plan:       Patient presents today for follow-up he previously had a bone resection and a tendon transfer on the lateral portion of the patient's right foot.  Patient is currently under my care for pain management.  Patient has a history of MS with generalized body aches and pain.  Patient has chronic pain encompassing the right foot he states it really has not changed very much it is constant even when he is not weight-bearing.  Patient states he has recently developed some sciatic nerve pain on the right side which has been going on for the past 6 weeks.  Patient states the sciatic nerve pain has gotten progressively worse it is now shooting all the way down his right leg from his buttocks he states the pain is a 9.5 to a 10 he states his entire right foot has gone numb.  Patient states he is very concerned as this has gotten progressively worse [facility] and states they gave him a muscle relaxer shot but they did not perform any test patient states he knows he has some issues with degenerative changes in his spine.  I did advised the patient he likely needs an MRI or CT of the spine he indicated today he was going to go to another emergency room to try to be evaluated as soon as possible I certainly recommended he contact Neurology and follow-up with them.  Patient states the pain was so bad he was not been able to sleep.  The numbness in the patient's right foot has been going on for about 24 hours states it is so uncomfortable he feels as if he would be better just cutting his leg off.  Patient continues to take Lyrica he states he does not think he could function without the Lyrica because of his nerve related symptoms.  Patient dispensed a prescription today for pain medication we will have  to continue pain medication with Lyrica to manage the patient's discomfort he does have a bulge overlying the lateral portion of the right foot this is something we will have to monitor certainly if it becomes larger we may have to do some additional imaging studies.  Patient indicated today with out the pain medication and the Lyrica he does not think he would be able to function at all he states he is in enough discomfort on a regular basis but this makes it somewhat tolerable day-to-day.    Patient has a current pain management contract on file and the patient's drug screen is up-to-date.  Recommended evaluation by ED department and follow-up with Neurology.  This note was created using [employer] voice recognition software that occasionally misinterpreted phrases or words.

## 2025-03-26 RX ORDER — CELECOXIB 200 MG/1
200 CAPSULE ORAL 2 TIMES DAILY
Qty: 60 CAPSULE | Refills: 2 | Status: SHIPPED | OUTPATIENT
Start: 2025-03-26

## 2025-03-28 NOTE — LETTER
July 7, 2019      [facility]  [address]  [place] [address]           [facility]  [address] [place] [address]  Phone: [phone]  Fax: [phone]          Patient: Sg Sesay   MR Number: [id number]   YOB: 1975   Date of Visit: 7/3/2019       Dear [facility]:    Thank you for referring Sg Sesay to me for evaluation. Attached you will find relevant portions of my assessment and plan of care.    If you have questions, please do not hesitate to call me. I look forward to following Sg Sesay along with you.    Sincerely,    Jah Torres, LISANDRO    Enclosure  CC:  No Recipients    If you would like to receive this communication electronically, please contact [email or web address] or [phone] to request more information on [employer] Link access.    For providers and/or their staff who would like to refer a patient to [facility], please contact us through our one-stop-shop provider referral line, [facility], at [phone].    If you feel you have received this communication in error or would no longer like to receive these types of communications, please e-mail [email or web address]          No protocol for requested medication.    Medication: hydrOXYzine (ATARAX) 50 MG tablet     Last office visit date: 12/13/24  Next office visit date: 12/15/25    Pharmacy: [id number] - [place], [place] - [address] AT [facility] [place]    Order pended, routed to clinician for review.

## 2025-04-16 ENCOUNTER — HOSPITAL ENCOUNTER (OUTPATIENT)
Dept: RADIOLOGY | Facility: HOSPITAL | Age: 50
Discharge: HOME | End: 2025-04-16
Attending: PODIATRIST
Payer: MEDICARE

## 2025-04-16 ENCOUNTER — OFFICE VISIT (OUTPATIENT)
Dept: PODIATRY | Facility: CLINIC | Age: 50
End: 2025-04-16
Payer: MEDICARE

## 2025-04-16 DIAGNOSIS — M79.671 CHRONIC PAIN IN RIGHT FOOT: ICD-10-CM

## 2025-04-16 DIAGNOSIS — G89.29 CHRONIC PAIN IN RIGHT FOOT: ICD-10-CM

## 2025-04-16 DIAGNOSIS — M79.2 NEURITIS: ICD-10-CM

## 2025-04-16 DIAGNOSIS — M79.674 TOE PAIN, RIGHT: Primary | ICD-10-CM

## 2025-04-16 DIAGNOSIS — M79.674 TOE PAIN, RIGHT: ICD-10-CM

## 2025-04-16 PROCEDURE — 73630 X-RAY EXAM OF FOOT: CPT | Mod: TC,RT

## 2025-04-16 PROCEDURE — 99213 OFFICE O/P EST LOW 20 MIN: CPT | Mod: PBBFAC,25 | Performed by: PODIATRIST

## 2025-04-16 PROCEDURE — 99999 PR PBB SHADOW E&M-EST. PATIENT-LVL III: CPT | Mod: PBBFAC,,, | Performed by: PODIATRIST

## 2025-04-16 PROCEDURE — 99213 OFFICE O/P EST LOW 20 MIN: CPT | Mod: S$PBB,,, | Performed by: PODIATRIST

## 2025-04-16 RX ORDER — DICLOFENAC SODIUM 75 MG/1
75 TABLET, DELAYED RELEASE ORAL 2 TIMES DAILY
COMMUNITY
Start: 2025-04-02

## 2025-04-16 RX ORDER — OXYCODONE AND ACETAMINOPHEN 10; 325 MG/1; MG/1
1 TABLET ORAL 2 TIMES DAILY
Qty: 60 TABLET | Refills: 0 | Status: SHIPPED | OUTPATIENT
Start: 2025-04-16 | End: 2025-05-16

## 2025-04-19 NOTE — PROGRESS NOTES
Subjective:       Patient ID: Sg Sesay is a 49 y.o. male.    Chief Complaint: Foot Pain     Patient presents today for follow-up he previously had a bone resection and a tendon transfer on the lateral portion of the patient's right foot.  Patient is currently under my care for pain management.  Patient has a history of MS with generalized body aches and pain.    Foot Pain  Associated symptoms include joint swelling.   Follow-up  Associated symptoms include joint swelling.   Medication Refill  Associated symptoms include joint swelling.   Toe Pain     Ankle Pain     Foot Injury        Review of Systems   Musculoskeletal:  Positive for back pain and joint swelling.   All other systems reviewed and are negative.      Objective:      Physical Exam  Vitals and nursing note reviewed.   Constitutional:       Appearance: Normal appearance. He is well-developed.   Cardiovascular:      Rate and Rhythm: Normal rate and regular rhythm.      Pulses: Normal pulses.           Dorsalis pedis pulses are 2+ on the right side and 2+ on the left side.        Posterior tibial pulses are 2+ on the right side and 2+ on the left side.      Heart sounds: Normal heart sounds.   Pulmonary:      Effort: Pulmonary effort is normal.      Breath sounds: Normal breath sounds.   Musculoskeletal:         General: Swelling and tenderness present.      Right foot: Decreased range of motion. Deformity present.   Feet:      Right foot:      Protective Sensation: 4 sites tested.  4 sites sensed.      Left foot:      Protective Sensation: 4 sites tested.  4 sites sensed.   Skin:     General: Skin is warm.      Capillary Refill: Capillary refill takes less than 2 seconds.   Neurological:      General: No focal deficit present.      Mental Status: He is alert.   Psychiatric:         Mood and Affect: Mood normal.         Behavior: Behavior normal.         Thought Content: Thought content normal.         Judgment: Judgment normal.                                                                     Assessment:       1. Toe pain, right    2. Neuritis    3. Chronic pain in right foot          Plan:       Patient presents today for follow-up he previously had a bone resection and a tendon transfer on the lateral portion of the patient's right foot.  Patient is currently under my care for pain management.  Patient has a history of MS with generalized body aches and pain.  Patient has chronic pain encompassing the right foot he states it really has not changed very much it is constant even when he is not weight-bearing.  Patient is relating a lot of pain and discomfort involving the 4th digit on the right foot he states it feels as if it is broken and hurts all of the time.  Patient states the pain was so bad he was not been able to sleep.  Patient dispensed a prescription today for pain medication we will have to continue pain medication with Lyrica to manage the patient's discomfort he does have a bulge overlying the lateral portion of the right foot this is something we will have to monitor certainly if it becomes larger we may have to do some additional imaging studies.  Patient states [facility] however nobody has contacted him yet to discuss the results patient has not been able to download the report or the images itself on their application.  X-rays were evaluated of the patient's right foot there are no signs of fracture or dislocation involving the 4th digit right I did advised the patient clearly this is all nerve related.  Patient states at times that is feels as if his foot is about to explode in his pain level is tend.  Patient will need a drug screen prior to next visit I encouraged the patient to [facility] to get the [facility] of his [facility].  Patient indicated today with out the pain medication and the Lyrica he does not think he would be able to function at all he states he is in enough  discomfort on a regular basis but this makes it somewhat tolerable day-to-day.    Patient has a current pain management contract on file and the patient's drug screen is up-to-date.  This note was created using [employer] voice recognition software that occasionally misinterpreted phrases or words.

## 2025-05-12 ENCOUNTER — RESULTS FOLLOW-UP (OUTPATIENT)
Dept: PODIATRY | Facility: CLINIC | Age: 50
End: 2025-05-12

## 2025-05-12 ENCOUNTER — TELEPHONE (OUTPATIENT)
Dept: PODIATRY | Facility: CLINIC | Age: 50
End: 2025-05-12
Payer: MEDICARE

## 2025-05-12 ENCOUNTER — LAB VISIT (OUTPATIENT)
Dept: LAB | Facility: HOSPITAL | Age: 50
End: 2025-05-12
Attending: PODIATRIST
Payer: MEDICARE

## 2025-05-12 DIAGNOSIS — M79.2 NEURITIS: ICD-10-CM

## 2025-05-12 LAB
AMPHET UR QL SCN: NEGATIVE
BARBITURATE SCN PRESENT UR: NEGATIVE
BENZODIAZ UR QL SCN: NEGATIVE
CANNABINOIDS UR QL SCN: NEGATIVE
COCAINE UR QL SCN: NEGATIVE
CREAT UR-MCNC: 152.2 MG/DL (ref 23–375)
METHADONE UR QL SCN: NEGATIVE
OPIATES UR QL SCN: NEGATIVE
PCP UR QL: NEGATIVE

## 2025-05-12 PROCEDURE — 80307 DRUG TEST PRSMV CHEM ANLYZR: CPT

## 2025-05-13 ENCOUNTER — OFFICE VISIT (OUTPATIENT)
Dept: PODIATRY | Facility: CLINIC | Age: 50
End: 2025-05-13
Payer: MEDICARE

## 2025-05-13 VITALS
HEART RATE: 76 BPM | DIASTOLIC BLOOD PRESSURE: 83 MMHG | HEIGHT: 72 IN | WEIGHT: 175.06 LBS | BODY MASS INDEX: 23.71 KG/M2 | SYSTOLIC BLOOD PRESSURE: 146 MMHG

## 2025-05-13 DIAGNOSIS — M79.2 NEURITIS: ICD-10-CM

## 2025-05-13 DIAGNOSIS — G89.29 CHRONIC PAIN IN RIGHT FOOT: ICD-10-CM

## 2025-05-13 DIAGNOSIS — G35 MS (MULTIPLE SCLEROSIS): ICD-10-CM

## 2025-05-13 DIAGNOSIS — M79.672 FOOT PAIN, BILATERAL: Primary | ICD-10-CM

## 2025-05-13 DIAGNOSIS — M79.671 CHRONIC PAIN IN RIGHT FOOT: ICD-10-CM

## 2025-05-13 DIAGNOSIS — M79.671 FOOT PAIN, BILATERAL: Primary | ICD-10-CM

## 2025-05-13 DIAGNOSIS — M79.674 TOE PAIN, RIGHT: ICD-10-CM

## 2025-05-13 PROCEDURE — 99213 OFFICE O/P EST LOW 20 MIN: CPT | Mod: S$PBB,,, | Performed by: PODIATRIST

## 2025-05-13 PROCEDURE — 99214 OFFICE O/P EST MOD 30 MIN: CPT | Mod: PBBFAC,PN | Performed by: PODIATRIST

## 2025-05-13 PROCEDURE — 99999 PR PBB SHADOW E&M-EST. PATIENT-LVL IV: CPT | Mod: PBBFAC,,, | Performed by: PODIATRIST

## 2025-05-13 RX ORDER — OXYCODONE AND ACETAMINOPHEN 10; 325 MG/1; MG/1
1 TABLET ORAL 2 TIMES DAILY
Qty: 60 TABLET | Refills: 0 | Status: SHIPPED | OUTPATIENT
Start: 2025-05-13 | End: 2025-06-12

## 2025-05-14 NOTE — PROGRESS NOTES
Subjective:       Patient ID: Sg Sesay is a 49 y.o. male.    Chief Complaint: Toe Pain     Patient presents today for follow-up he previously had a bone resection and a tendon transfer on the lateral portion of the patient's right foot.  Patient is currently under my care for pain management.  Patient has a history of MS with generalized body aches and pain.    Foot Pain  Associated symptoms include joint swelling.   Follow-up  Associated symptoms include joint swelling.   Medication Refill  Associated symptoms include joint swelling.   Toe Pain     Ankle Pain     Foot Injury        Review of Systems   Musculoskeletal:  Positive for back pain and joint swelling.   All other systems reviewed and are negative.      Objective:      Physical Exam  Vitals and nursing note reviewed.   Constitutional:       Appearance: Normal appearance. He is well-developed.   Cardiovascular:      Rate and Rhythm: Normal rate and regular rhythm.      Pulses: Normal pulses.           Dorsalis pedis pulses are 2+ on the right side and 2+ on the left side.        Posterior tibial pulses are 2+ on the right side and 2+ on the left side.      Heart sounds: Normal heart sounds.   Pulmonary:      Effort: Pulmonary effort is normal.      Breath sounds: Normal breath sounds.   Musculoskeletal:         General: Swelling and tenderness present.      Right foot: Decreased range of motion. Deformity present.   Feet:      Right foot:      Protective Sensation: 4 sites tested.  4 sites sensed.      Left foot:      Protective Sensation: 4 sites tested.  4 sites sensed.   Skin:     General: Skin is warm.      Capillary Refill: Capillary refill takes less than 2 seconds.   Neurological:      General: No focal deficit present.      Mental Status: He is alert.   Psychiatric:         Mood and Affect: Mood normal.         Behavior: Behavior normal.         Thought Content: Thought content normal.         Judgment: Judgment normal.                                                                           Assessment:       1. Foot pain, bilateral    2. Neuritis    3. Chronic pain in right foot    4. MS (multiple sclerosis)    5. Toe pain, right          Plan:       Patient presents today for follow-up he previously had a bone resection and a tendon transfer on the lateral portion of the patient's right foot.  Patient is currently under my care for pain management.  Patient has a history of MS with generalized body aches and pain.  Patient has chronic pain encompassing the right foot he states it really has not changed very much it is constant even when he is not weight-bearing.  Patient is relating a lot of pain and discomfort involving the 4th digit on the right foot he states it feels as if it is broken and hurts all of the time.  Patient states the pain was so bad he was not been able to sleep.  Patient dispensed a prescription today for pain medication we will have to continue pain medication with Lyrica to manage the patient's discomfort he does have a bulge overlying the lateral portion of the right foot this is something we will have to monitor certainly if it becomes larger we may have to do some additional imaging studies.   Patient states at times that is feels as if his foot is about to explode in his pain level is tend. Patient indicated today with out the pain medication and the Lyrica he does not think he would be able to function at all he states he is in enough discomfort on a regular basis but this makes it somewhat tolerable day-to-day.    Patient has a current pain management contract on file and the patient's drug screen is up-to-date.  Patient states his sciatic problem is doing somewhat better.  Patient is still experiencing tingling in both feet he states he does have an appointment scheduled to meet with the neurosurgeon following [facility]. patient was unable to tell me exactly what was noted on the  MRI but stating that there was significant problems with L5-S1.  Patient was made aware some of the nerve related discomfort sensations he is having in both feet may certainly be related to his back possibly even his MS.  This note was created using [employer] voice recognition software that occasionally misinterpreted phrases or words.

## 2025-06-11 ENCOUNTER — OFFICE VISIT (OUTPATIENT)
Dept: PODIATRY | Facility: CLINIC | Age: 50
End: 2025-06-11
Payer: MEDICARE

## 2025-06-11 VITALS
BODY MASS INDEX: 23.71 KG/M2 | WEIGHT: 175.06 LBS | HEART RATE: 64 BPM | HEIGHT: 72 IN | SYSTOLIC BLOOD PRESSURE: 115 MMHG | DIASTOLIC BLOOD PRESSURE: 72 MMHG

## 2025-06-11 DIAGNOSIS — G35 MS (MULTIPLE SCLEROSIS): ICD-10-CM

## 2025-06-11 DIAGNOSIS — M79.2 NEURITIS: Primary | ICD-10-CM

## 2025-06-11 DIAGNOSIS — M79.672 FOOT PAIN, BILATERAL: ICD-10-CM

## 2025-06-11 DIAGNOSIS — M79.671 FOOT PAIN, BILATERAL: ICD-10-CM

## 2025-06-11 DIAGNOSIS — G89.29 CHRONIC PAIN IN RIGHT FOOT: ICD-10-CM

## 2025-06-11 DIAGNOSIS — M79.671 CHRONIC PAIN IN RIGHT FOOT: ICD-10-CM

## 2025-06-11 PROCEDURE — 99213 OFFICE O/P EST LOW 20 MIN: CPT | Mod: S$PBB,,, | Performed by: PODIATRIST

## 2025-06-11 PROCEDURE — 99999 PR PBB SHADOW E&M-EST. PATIENT-LVL IV: CPT | Mod: PBBFAC,,, | Performed by: PODIATRIST

## 2025-06-11 PROCEDURE — 99214 OFFICE O/P EST MOD 30 MIN: CPT | Mod: PBBFAC | Performed by: PODIATRIST

## 2025-06-11 RX ORDER — BUSPIRONE HYDROCHLORIDE 10 MG/1
20 TABLET ORAL
COMMUNITY
Start: 2025-06-09

## 2025-06-11 RX ORDER — OXYCODONE AND ACETAMINOPHEN 10; 325 MG/1; MG/1
1 TABLET ORAL 2 TIMES DAILY
Qty: 60 TABLET | Refills: 0 | Status: SHIPPED | OUTPATIENT
Start: 2025-06-11 | End: 2025-07-11

## 2025-06-11 RX ORDER — PREGABALIN 200 MG/1
200 CAPSULE ORAL 3 TIMES DAILY
Qty: 90 CAPSULE | Refills: 5 | Status: SHIPPED | OUTPATIENT
Start: 2025-06-11 | End: 2025-12-08

## 2025-06-11 RX ORDER — BUSPIRONE HYDROCHLORIDE 5 MG/1
5 TABLET ORAL
COMMUNITY
Start: 2025-06-09

## 2025-06-12 NOTE — PROGRESS NOTES
Subjective:       Patient ID: Sg Sesay is a 49 y.o. male.    Chief Complaint: Foot Pain     Patient presents today for follow-up he previously had a bone resection and a tendon transfer on the lateral portion of the patient's right foot.  Patient is currently under my care for pain management.  Patient has a history of MS with generalized body aches and pain.    Foot Pain  Associated symptoms include joint swelling.   Follow-up  Associated symptoms include joint swelling.   Medication Refill  Associated symptoms include joint swelling.   Toe Pain     Ankle Pain     Foot Injury        Review of Systems   Musculoskeletal:  Positive for back pain and joint swelling.   All other systems reviewed and are negative.      Objective:      Physical Exam  Vitals and nursing note reviewed.   Constitutional:       Appearance: Normal appearance. He is well-developed.   Cardiovascular:      Rate and Rhythm: Normal rate and regular rhythm.      Pulses: Normal pulses.           Dorsalis pedis pulses are 2+ on the right side and 2+ on the left side.        Posterior tibial pulses are 2+ on the right side and 2+ on the left side.      Heart sounds: Normal heart sounds.   Pulmonary:      Effort: Pulmonary effort is normal.      Breath sounds: Normal breath sounds.   Musculoskeletal:         General: Swelling and tenderness present.      Right foot: Decreased range of motion. Deformity present.   Feet:      Right foot:      Protective Sensation: 4 sites tested.  4 sites sensed.      Left foot:      Protective Sensation: 4 sites tested.  4 sites sensed.   Skin:     General: Skin is warm.      Capillary Refill: Capillary refill takes less than 2 seconds.   Neurological:      General: No focal deficit present.      Mental Status: He is alert.   Psychiatric:         Mood and Affect: Mood normal.         Behavior: Behavior normal.         Thought Content: Thought content normal.         Judgment: Judgment normal.                                                                                 Assessment:       1. Neuritis    2. Chronic pain in right foot    3. MS (multiple sclerosis)    4. Foot pain, bilateral          Plan:       Patient presents today for follow-up he previously had a bone resection and a tendon transfer on the lateral portion of the patient's right foot.  Patient is currently under my care for pain management.  Patient has a history of MS with generalized body aches and pain.  Patient has chronic pain encompassing the right foot he states it really has not changed very much it is constant even when he is not weight-bearing.  Patient is relating a lot of pain and discomfort involving the 4th digit on the right foot he states it feels as if it is broken and hurts all of the time.  Patient states the pain was so bad he was not been able to sleep.  Patient dispensed a prescription today for pain medication we will have to continue pain medication with Lyrica to manage the patient's discomfort he does have a bulge overlying the lateral portion of the right foot this is something we will have to monitor certainly if it becomes larger we may have to do some additional imaging studies.   Patient states at times that is feels as if his foot is about to explode in his pain level is tend. Patient indicated today with out the pain medication and the Lyrica he does not think he would be able to function at all he states he is in enough discomfort on a regular basis but this makes it somewhat tolerable day-to-day.    Patient has a current pain management contract on file and the patient's drug screen is up-to-date.  Patient states he did not meet with the nurse practitioner for the neurosurgeon they discussed possibly doing injections in his back they did review his MRI indicating he does have nerve that is being impinged.  Patient states they were supposed to call him back to set up the injection that was a week ago and he  has not heard from them yet.  Patient states he will be calling them to follow-up he states at 1 point he was told he may eventually need some type of surgery.  [facility] indicated [facility] because nothing gets done in a timely fashion.  Patient was advised it will be very interesting if he does receive a back injection to see how this may positively affect his feet he does have a 10s unit he is going to try on the right lower extremity and see if this gives him any relief.  I do feel that is some of the patient's nerve related is shoes are possibly related to his back and or MS.  This note was created using [employer] voice recognition software that occasionally misinterpreted phrases or words.

## 2025-07-08 ENCOUNTER — OFFICE VISIT (OUTPATIENT)
Dept: PODIATRY | Facility: CLINIC | Age: 50
End: 2025-07-08
Payer: MEDICARE

## 2025-07-08 VITALS
SYSTOLIC BLOOD PRESSURE: 128 MMHG | HEIGHT: 72 IN | BODY MASS INDEX: 23.71 KG/M2 | HEART RATE: 81 BPM | DIASTOLIC BLOOD PRESSURE: 73 MMHG | WEIGHT: 175.06 LBS

## 2025-07-08 DIAGNOSIS — M79.671 CHRONIC PAIN IN RIGHT FOOT: ICD-10-CM

## 2025-07-08 DIAGNOSIS — M79.2 NEURITIS: ICD-10-CM

## 2025-07-08 DIAGNOSIS — G89.29 CHRONIC PAIN IN RIGHT FOOT: ICD-10-CM

## 2025-07-08 DIAGNOSIS — G35 MS (MULTIPLE SCLEROSIS): Primary | ICD-10-CM

## 2025-07-08 PROCEDURE — 99214 OFFICE O/P EST MOD 30 MIN: CPT | Mod: PBBFAC,PN | Performed by: PODIATRIST

## 2025-07-08 PROCEDURE — 99213 OFFICE O/P EST LOW 20 MIN: CPT | Mod: S$PBB,,, | Performed by: PODIATRIST

## 2025-07-08 PROCEDURE — 99999 PR PBB SHADOW E&M-EST. PATIENT-LVL IV: CPT | Mod: PBBFAC,,, | Performed by: PODIATRIST

## 2025-07-08 RX ORDER — CELECOXIB 200 MG/1
200 CAPSULE ORAL 2 TIMES DAILY
Qty: 60 CAPSULE | Refills: 2 | Status: SHIPPED | OUTPATIENT
Start: 2025-07-08

## 2025-07-08 RX ORDER — OXYCODONE AND ACETAMINOPHEN 10; 325 MG/1; MG/1
1 TABLET ORAL 2 TIMES DAILY
Qty: 60 TABLET | Refills: 0 | Status: SHIPPED | OUTPATIENT
Start: 2025-07-08 | End: 2025-08-07

## 2025-07-09 NOTE — PROGRESS NOTES
Subjective:       Patient ID: Sg Sesay is a 49 y.o. male.    Chief Complaint: Foot Pain     Patient presents today for follow-up he previously had a bone resection and a tendon transfer on the lateral portion of the patient's right foot.  Patient is currently under my care for pain management.  Patient has a history of MS with generalized body aches and pain.    Foot Pain  Associated symptoms include joint swelling.   Follow-up  Associated symptoms include joint swelling.   Medication Refill  Associated symptoms include joint swelling.   Toe Pain     Ankle Pain     Foot Injury        Review of Systems   Musculoskeletal:  Positive for back pain and joint swelling.   All other systems reviewed and are negative.      Objective:      Physical Exam  Vitals and nursing note reviewed.   Constitutional:       Appearance: Normal appearance. He is well-developed.   Cardiovascular:      Rate and Rhythm: Normal rate and regular rhythm.      Pulses: Normal pulses.           Dorsalis pedis pulses are 2+ on the right side and 2+ on the left side.        Posterior tibial pulses are 2+ on the right side and 2+ on the left side.      Heart sounds: Normal heart sounds.   Pulmonary:      Effort: Pulmonary effort is normal.      Breath sounds: Normal breath sounds.   Musculoskeletal:         General: Swelling and tenderness present.      Right foot: Decreased range of motion. Deformity present.   Feet:      Right foot:      Protective Sensation: 4 sites tested.  4 sites sensed.      Left foot:      Protective Sensation: 4 sites tested.  4 sites sensed.   Skin:     General: Skin is warm.      Capillary Refill: Capillary refill takes less than 2 seconds.   Neurological:      General: No focal deficit present.      Mental Status: He is alert.   Psychiatric:         Mood and Affect: Mood normal.         Behavior: Behavior normal.         Thought Content: Thought content normal.         Judgment: Judgment normal.                                                                                           Assessment:       1. MS (multiple sclerosis)    2. Neuritis    3. Chronic pain in right foot          Plan:       Patient presents today for follow-up he previously had a bone resection and a tendon transfer on the lateral portion of the patient's right foot.  Patient is currently under my care for pain management.  Patient has a history of MS with generalized body aches and pain.  Patient has chronic pain encompassing the right foot he states it really has not changed very much it is constant even when he is not weight-bearing.   Patient has a marked area of inflammation and swelling on the plantar lateral aspect of the patient's right foot he states it stays like this all of the time and it makes him feel like he is walking uneven.  Patient dispensed a prescription today for pain medication we will have to continue pain medication with Lyrica to manage the patient's discomfort he does have a bulge overlying the lateral portion of the right foot this is something we will have to monitor certainly if it becomes larger we may have to do some additional imaging studies.   Patient states at times that is feels as if his foot is about to explode in his pain level is tend. Patient indicated today with out the pain medication and the Lyrica he does not think he would be able to function at all he states he is in enough discomfort on a regular basis but this makes it somewhat tolerable day-to-day.    Patient has a current pain management contract on file and the patient's drug screen is up-to-date.  Patient states he [facility] not [facility] regarding [facility] he states he has not started to try using the 10s unit on the right foot as of yet.  I did add some additional arch padding in the form of a small blue arch pad to the patient's insole on the right side I am hoping that this will take some of the  stress off of the plantar lateral aspect of the right foot I have advised him we can always go to a more aggressive pad as necessary I started out with a small pad but also gave the patient a medium pad if he feels he needs to change this out or try something different.  Patient states under no circumstances is he able to go barefoot because of the swelling.  [facility] indicated that [facility] is [facility] because nothing gets done in a timely fashion.  Patient was advised it will be very interesting if he does receive a back injection to see how this may positively affect his feet he does have a 10s unit he is going to try on the right lower extremity and see if this gives him any relief.  I do feel that is some of the patient's nerve related is shoes are possibly related to his back and or MS.  An updated annual pain management contract was executed today.  Plan follow-up 1 month the patient will continue taking Celebrex pain medication and Lyrica as directed.  This note was created using [employer] voice recognition software that occasionally misinterpreted phrases or words.

## 2025-08-04 ENCOUNTER — OFFICE VISIT (OUTPATIENT)
Dept: PODIATRY | Facility: CLINIC | Age: 50
End: 2025-08-04
Payer: MEDICARE

## 2025-08-04 VITALS
WEIGHT: 175.06 LBS | SYSTOLIC BLOOD PRESSURE: 138 MMHG | BODY MASS INDEX: 23.71 KG/M2 | HEART RATE: 69 BPM | DIASTOLIC BLOOD PRESSURE: 80 MMHG | HEIGHT: 72 IN

## 2025-08-04 DIAGNOSIS — G89.29 CHRONIC PAIN IN RIGHT FOOT: ICD-10-CM

## 2025-08-04 DIAGNOSIS — M79.2 NEURITIS: Primary | ICD-10-CM

## 2025-08-04 DIAGNOSIS — M79.671 CHRONIC PAIN IN RIGHT FOOT: ICD-10-CM

## 2025-08-04 DIAGNOSIS — G35 MS (MULTIPLE SCLEROSIS): ICD-10-CM

## 2025-08-04 DIAGNOSIS — Z02.89 PAIN MANAGEMENT CONTRACT AGREEMENT: ICD-10-CM

## 2025-08-04 PROCEDURE — 99214 OFFICE O/P EST MOD 30 MIN: CPT | Mod: PBBFAC | Performed by: PODIATRIST

## 2025-08-04 PROCEDURE — 99999 PR PBB SHADOW E&M-EST. PATIENT-LVL IV: CPT | Mod: PBBFAC,,, | Performed by: PODIATRIST

## 2025-08-04 RX ORDER — OXYCODONE AND ACETAMINOPHEN 10; 325 MG/1; MG/1
1 TABLET ORAL 2 TIMES DAILY
Qty: 60 TABLET | Refills: 0 | Status: SHIPPED | OUTPATIENT
Start: 2025-08-04 | End: 2025-09-03

## 2025-08-05 NOTE — PROGRESS NOTES
Subjective:       Patient ID: Sg Sesay is a 49 y.o. male.    Chief Complaint: Follow-up     Patient presents today for follow-up he previously had a bone resection and a tendon transfer on the lateral portion of the patient's right foot.  Patient is currently under my care for pain management.  Patient has a history of MS with generalized body aches and pain.    Foot Pain  Associated symptoms include joint swelling.   Follow-up  Associated symptoms include joint swelling.   Medication Refill  Associated symptoms include joint swelling.   Toe Pain     Ankle Pain     Foot Injury        Review of Systems   Musculoskeletal:  Positive for back pain and joint swelling.   All other systems reviewed and are negative.      Objective:      Physical Exam  Vitals and nursing note reviewed.   Constitutional:       Appearance: Normal appearance. He is well-developed.   Cardiovascular:      Rate and Rhythm: Normal rate and regular rhythm.      Pulses: Normal pulses.           Dorsalis pedis pulses are 2+ on the right side and 2+ on the left side.        Posterior tibial pulses are 2+ on the right side and 2+ on the left side.      Heart sounds: Normal heart sounds.   Pulmonary:      Effort: Pulmonary effort is normal.      Breath sounds: Normal breath sounds.   Musculoskeletal:         General: Swelling and tenderness present.      Right foot: Decreased range of motion. Deformity present.   Feet:      Right foot:      Protective Sensation: 4 sites tested.  4 sites sensed.      Left foot:      Protective Sensation: 4 sites tested.  4 sites sensed.   Skin:     General: Skin is warm.      Capillary Refill: Capillary refill takes less than 2 seconds.   Neurological:      General: No focal deficit present.      Mental Status: He is alert.   Psychiatric:         Mood and Affect: Mood normal.         Behavior: Behavior normal.         Thought Content: Thought content normal.         Judgment: Judgment normal.                                                                                                 Assessment:       1. Neuritis    2. Chronic pain in right foot    3. MS (multiple sclerosis)    4. Pain management contract agreement          Plan:       Patient presents today for follow-up he previously had a bone resection and a tendon transfer on the lateral portion of the patient's right foot.  Patient is currently under my care for pain management.  Patient has a history of MS with generalized body aches and pain.  Patient has chronic pain encompassing the right foot he states it really has not changed very much it is constant even when he is not weight-bearing.   Patient has a marked area of inflammation and swelling on the plantar lateral aspect of the patient's right foot he states it stays like this all of the time and it makes him feel like he is walking uneven.  Patient dispensed a prescription today for pain medication we will have to continue pain medication with Lyrica to manage the patient's discomfort he does have a bulge overlying the lateral portion of the right foot this is something we will have to monitor certainly if it becomes larger we may have to do some additional imaging studies.   Patient states at times that is feels as if his foot is about to explode in his pain level is tend. Patient indicated today with out the pain medication and the Lyrica he does not think he would be able to function at all he states he is in enough discomfort on a regular basis but this makes it somewhat tolerable day-to-day.    Patient has a current pain management contract on file and the patient's drug screen is up-to-date.  Patient states he [facility] not [facility] regarding [facility] he states he has not started to try using the 10s unit on the right foot as of yet.  I did add some additional arch padding in the form of a small blue arch pad to the patient's insole on the right side I  am hoping that this will take some of the stress off of the plantar lateral aspect of the right foot I have advised him we can always go to a more aggressive pad as necessary I started out with a small pad but also gave the patient a medium pad if he feels he needs to change this out or try something different.  Patient states under no circumstances is he able to go barefoot because of the swelling.  [facility] indicated that [facility] is [facility] because nothing gets done in a timely fashion.  Patient was advised it will be very interesting if he does receive a back injection to see how this may positively affect his feet he does have a 10s unit he is going to try on the right lower extremity and see if this gives him any relief.  I do feel that is some of the patient's nerve related is shoes are possibly related to his back and or MS.  Patient states he still waiting for an appointment with the back surgeon he is scheduled to see the back surgeon on August 13th he states he saw the physicians decision and initially and may be seeing the physician assistant again he is not sure if they are plan on doing a back injection or what the treatment plan will be.  I feel it is very important for the patient's back to be appropriately evaluated.  Plan follow-up 1 month the patient will continue taking Celebrex pain medication and Lyrica as directed.  This note was created using [employer] voice recognition software that occasionally misinterpreted phrases or words.

## 2025-09-03 ENCOUNTER — TELEPHONE (OUTPATIENT)
Dept: PODIATRY | Facility: CLINIC | Age: 50
End: 2025-09-03
Payer: MEDICARE

## 2025-09-03 ENCOUNTER — OFFICE VISIT (OUTPATIENT)
Dept: PODIATRY | Facility: CLINIC | Age: 50
End: 2025-09-03
Payer: MEDICARE

## 2025-09-03 VITALS
DIASTOLIC BLOOD PRESSURE: 92 MMHG | HEART RATE: 69 BPM | HEIGHT: 72 IN | BODY MASS INDEX: 23.71 KG/M2 | SYSTOLIC BLOOD PRESSURE: 144 MMHG | WEIGHT: 175.06 LBS

## 2025-09-03 DIAGNOSIS — M79.671 FOOT PAIN, BILATERAL: ICD-10-CM

## 2025-09-03 DIAGNOSIS — M79.671 CHRONIC PAIN IN RIGHT FOOT: ICD-10-CM

## 2025-09-03 DIAGNOSIS — G35 MS (MULTIPLE SCLEROSIS): ICD-10-CM

## 2025-09-03 DIAGNOSIS — M79.2 NEURITIS: Primary | ICD-10-CM

## 2025-09-03 DIAGNOSIS — M79.672 FOOT PAIN, BILATERAL: ICD-10-CM

## 2025-09-03 DIAGNOSIS — G89.29 CHRONIC PAIN IN RIGHT FOOT: ICD-10-CM

## 2025-09-03 PROCEDURE — 99214 OFFICE O/P EST MOD 30 MIN: CPT | Mod: PBBFAC | Performed by: PODIATRIST

## 2025-09-03 PROCEDURE — 99213 OFFICE O/P EST LOW 20 MIN: CPT | Mod: S$PBB,,, | Performed by: PODIATRIST

## 2025-09-03 PROCEDURE — 99999 PR PBB SHADOW E&M-EST. PATIENT-LVL IV: CPT | Mod: PBBFAC,,, | Performed by: PODIATRIST

## 2025-09-03 RX ORDER — OXYCODONE AND ACETAMINOPHEN 10; 325 MG/1; MG/1
1 TABLET ORAL 2 TIMES DAILY
Qty: 60 TABLET | Refills: 0 | Status: SHIPPED | OUTPATIENT
Start: 2025-09-03 | End: 2025-10-03

## (undated) DEVICE — SEE MEDLINE ITEM 146298

## (undated) DEVICE — SUT MONOCRYL 4-0 SH UND MON

## (undated) DEVICE — SYR B-D DISP CONTROL 10CC100/C

## (undated) DEVICE — GLOVE SENSICARE PI GRN 7.5

## (undated) DEVICE — SUT BONE WAX 2.5 GRMS 12/BX

## (undated) DEVICE — BANDAGE ESMARK ELASTIC ST 4X9

## (undated) DEVICE — SUT 3-0 VICRYL / SH (J416)

## (undated) DEVICE — TOURNIQUET SB QC SP 24X4IN

## (undated) DEVICE — DRAPE THREE-QUARTER 53X77IN

## (undated) DEVICE — BANDAGE MATRIX HK LOOP 4IN 5YD

## (undated) DEVICE — CANISTER SUCTION RIGID 3000CC

## (undated) DEVICE — BLADE LONG 31.0MM X 9.0MM

## (undated) DEVICE — DRAPE STERI INSTRUMENT 1018

## (undated) DEVICE — GLOVE BIOGEL PI ORTHO PRO 7.5

## (undated) DEVICE — SOL 9P NACL IRR PIC IL

## (undated) DEVICE — COVER EQUIP 36X12 W/ELSTC BAND

## (undated) DEVICE — GLOVE SENSICARE PI SURG 7

## (undated) DEVICE — NDL ECLIPSE SAFETY 18GX1-1/2IN

## (undated) DEVICE — GOWN POLY REINF BRTH SLV LG

## (undated) DEVICE — Device

## (undated) DEVICE — TAPE CASTING 4 X 4YDS WHT

## (undated) DEVICE — GOWN POLY REINF BRTH SLV 3XL

## (undated) DEVICE — PAD ABDOMINAL STERILE 8X10IN

## (undated) DEVICE — CANISTER SUCTION 3000CC

## (undated) DEVICE — DECANTER VIAL ASEPTIC TRANSFER

## (undated) DEVICE — TOURNIQUET SB QC SP 18X4IN

## (undated) DEVICE — CAST TAPE DELTA PLUS 3X4 WHITE

## (undated) DEVICE — DRESSING N ADH OIL EMUL 3X3

## (undated) DEVICE — DRAPE STERI LONG

## (undated) DEVICE — BANDAGE CURAD ADH PLAS 2X4

## (undated) DEVICE — SEE MEDLINE ITEM 152522

## (undated) DEVICE — DRILL BIT

## (undated) DEVICE — ELECTRODE REM PLYHSV RETURN 9

## (undated) DEVICE — SUT ETHILON 4-0 PS2 18 BLK

## (undated) DEVICE — WIRE OLIVE THREADED 1.4X60MM
Type: IMPLANTABLE DEVICE | Site: FOOT | Status: NON-FUNCTIONAL
Removed: 2022-08-12

## (undated) DEVICE — JELLY KY LUBRICATING 5G PACKET

## (undated) DEVICE — SUT 2-0 ETHILON 18 FS

## (undated) DEVICE — PAD PREPS ALCOHOL 2-PLY LARGE

## (undated) DEVICE — PACK ELITE MINIVIEW DRAPE

## (undated) DEVICE — SPONGE COTTON TRAY 4X4IN

## (undated) DEVICE — NDL SAFETY 25G X 1.5 ECLIPSE

## (undated) DEVICE — GLOVE SURGEONS ULTRA TOUCH 6.5

## (undated) DEVICE — LABEL FOR UTILITY MARKER

## (undated) DEVICE — SUT ETHILON 3-0 PS2 18 BLK

## (undated) DEVICE — INTERPULSE SET

## (undated) DEVICE — GOWN SURGICAL XX LARGE X LONG

## (undated) DEVICE — PADDING CAST 4IN SPECIALIST

## (undated) DEVICE — NDL ECLIPSE SAF REG 18GX1.5IN

## (undated) DEVICE — GAUZE SPONGE 4X4 12PLY

## (undated) DEVICE — BLADE AGGR LRG TOOTH MED 31X9

## (undated) DEVICE — TOURNIQUET 1 PORT YELLOW24X4IN

## (undated) DEVICE — BLADE SURG #15 CARBON STEEL

## (undated) DEVICE — SEE MEDLINE ITEM 157116

## (undated) DEVICE — SUT 4-0 VICRYL / SH

## (undated) DEVICE — DRAPE MINI C-ARM

## (undated) DEVICE — PAD N ADH STRL 2X3IN

## (undated) DEVICE — GLOVE SENSICARE PI ORTHO 7.0

## (undated) DEVICE — MATRIX HEMOSTATIC FLOSEAL 5ML

## (undated) DEVICE — BANDAGE ROLL COTTN 4.5INX4.1YD

## (undated) DEVICE — SPLINT FIBERGLASS PAD 6X15

## (undated) DEVICE — SEE MEDLINE ITEM 146292

## (undated) DEVICE — GLOVE BIOGEL PI ORTHO PRO 6.5

## (undated) DEVICE — PAD ABD 8X10 STERILE

## (undated) DEVICE — COVER LIGHT HANDLE 80/CA

## (undated) DEVICE — SUT 4-0 ETHILON 18 PS-2

## (undated) DEVICE — SPONGE LAP 18X18 PREWASHED

## (undated) DEVICE — HEMOSTAT SURGICEL PWD 3G

## (undated) DEVICE — NDL ECLIPSE SAF REG 25GX1.5IN

## (undated) DEVICE — DRESSING XEROFORM FOIL 4X4

## (undated) DEVICE — GLOVE SURG ULTRA TOUCH 7

## (undated) DEVICE — SOL NACL IRR 1000ML BTL

## (undated) DEVICE — BLADE #15 STERILE CARBON

## (undated) DEVICE — UNDERGLOVE BIOGEL PI SZ 6.5 LF

## (undated) DEVICE — SOL IRR NACL .9% 3000ML

## (undated) DEVICE — SEE MEDLINE ITEM 156964

## (undated) DEVICE — CHLORAPREP W TINT 26ML APPL

## (undated) DEVICE — BANDAGE ADHESIVE

## (undated) DEVICE — SLEEVE SCD EXPRESS CALF MEDIUM

## (undated) DEVICE — GLOVE SURG ULTRA TOUCH 7.5

## (undated) DEVICE — GLOVE SENSICARE PI SURG 6.5

## (undated) DEVICE — SPONGE DERMACEA GAUZE 4X4

## (undated) DEVICE — TRAY SKIN SCRUB WET PREMIUM

## (undated) DEVICE — DRAPE T EXTRM SURG 121X128X90

## (undated) DEVICE — TOURNIQUET 1 PORT RED 18X4IN

## (undated) DEVICE — KIT SURGI-START 7695-SLA CUSTM

## (undated) DEVICE — KWIRE SMTH TRCR TIP 1.1X150MM
Type: IMPLANTABLE DEVICE | Site: FOOT | Status: NON-FUNCTIONAL
Removed: 2022-08-12

## (undated) DEVICE — NDL HYPO REG 25G X 1 1/2

## (undated) DEVICE — GOWN B1 X-LG X-LONG

## (undated) DEVICE — SUT MONO 3-0 PS-2 18 PLST